# Patient Record
Sex: FEMALE | Race: WHITE | Employment: PART TIME | URBAN - METROPOLITAN AREA
[De-identification: names, ages, dates, MRNs, and addresses within clinical notes are randomized per-mention and may not be internally consistent; named-entity substitution may affect disease eponyms.]

---

## 2017-01-06 ENCOUNTER — OFFICE VISIT (OUTPATIENT)
Dept: OBGYN | Facility: CLINIC | Age: 47
End: 2017-01-06
Payer: COMMERCIAL

## 2017-01-06 VITALS
BODY MASS INDEX: 22.45 KG/M2 | WEIGHT: 131.5 LBS | HEIGHT: 64 IN | SYSTOLIC BLOOD PRESSURE: 142 MMHG | DIASTOLIC BLOOD PRESSURE: 82 MMHG

## 2017-01-06 DIAGNOSIS — Z01.419 ENCOUNTER FOR GYNECOLOGICAL EXAMINATION WITHOUT ABNORMAL FINDING: Primary | ICD-10-CM

## 2017-01-06 DIAGNOSIS — Z13.6 CARDIOVASCULAR SCREENING; LDL GOAL LESS THAN 160: ICD-10-CM

## 2017-01-06 DIAGNOSIS — G44.219 EPISODIC TENSION-TYPE HEADACHE, NOT INTRACTABLE: ICD-10-CM

## 2017-01-06 DIAGNOSIS — I10 ESSENTIAL HYPERTENSION, BENIGN: ICD-10-CM

## 2017-01-06 PROCEDURE — G0145 SCR C/V CYTO,THINLAYER,RESCR: HCPCS | Mod: 90 | Performed by: OBSTETRICS & GYNECOLOGY

## 2017-01-06 PROCEDURE — 99000 SPECIMEN HANDLING OFFICE-LAB: CPT | Performed by: OBSTETRICS & GYNECOLOGY

## 2017-01-06 PROCEDURE — 99396 PREV VISIT EST AGE 40-64: CPT | Performed by: OBSTETRICS & GYNECOLOGY

## 2017-01-06 PROCEDURE — 80061 LIPID PANEL: CPT | Performed by: OBSTETRICS & GYNECOLOGY

## 2017-01-06 PROCEDURE — 87624 HPV HI-RISK TYP POOLED RSLT: CPT | Mod: 90 | Performed by: OBSTETRICS & GYNECOLOGY

## 2017-01-06 PROCEDURE — 36415 COLL VENOUS BLD VENIPUNCTURE: CPT | Performed by: OBSTETRICS & GYNECOLOGY

## 2017-01-06 PROCEDURE — 80053 COMPREHEN METABOLIC PANEL: CPT | Performed by: OBSTETRICS & GYNECOLOGY

## 2017-01-06 RX ORDER — TOPIRAMATE 25 MG/1
25 TABLET, FILM COATED ORAL 2 TIMES DAILY
Qty: 180 TABLET | Refills: 3 | Status: SHIPPED | OUTPATIENT
Start: 2017-01-06 | End: 2018-07-13

## 2017-01-06 RX ORDER — CANDESARTAN 4 MG/1
4 TABLET ORAL DAILY
Qty: 30 TABLET | Refills: 3 | Status: SHIPPED | OUTPATIENT
Start: 2017-01-06 | End: 2017-01-26

## 2017-01-06 NOTE — PROGRESS NOTES
"  SUBJECTIVE:  Evita Bey is an 46 year old  P4 woman who presents for annual exam. Patient's last menstrual period was 2016.      -notes elevated BP; requests initiation of anti-hypertensive     -will follow up with PCP    Past Medical History   Diagnosis Date     Calculus of kidney 1996     Calcium stone - etiology after w/u was excess calcium intake     Volvulus (H) child     Resolved without surgery - hospitalized for 1 day     Fertility testing 94-01     used lifetime total of 11 months of Clomid and 2 months of gonadotropins     Family history of other cardiovascular diseases      father with MI at age 60 yo, s/p PTCA     Past Surgical History   Procedure Laterality Date     C  delivery only  94,96,99,02     4 C/S     Current Outpatient Prescriptions   Medication     candesartan (ATACAND) 4 MG TABS tablet     topiramate (TOPAMAX) 25 MG tablet     etonogestrel-ethinyl estradiol (NUVARING) 0.12-0.015 MG/24HR vaginal ring     [DISCONTINUED] topiramate (TOPAMAX) 25 MG tablet     Cholecalciferol (VITAMIN D) 2000 UNITS CAPS     OMEGA-3 CAPS   OR     MULTI-VITAMIN OR TABS     GLUCOSAMINE 500 MG OR CAPS     PROBIOTICA OR CHEW     No current facility-administered medications for this visit.     Allergies   Allergen Reactions     No Known Drug Allergies      Social History   Substance Use Topics     Smoking status: Never Smoker      Smokeless tobacco: Never Used     Alcohol Use: 0.0 oz/week     0 Standard drinks or equivalent per week      Comment: 1 wine cooler a week       Review of Systems  CONSTITUTIONAL:NEGATIVE  EYES: NEGATIVE  ENT/MOUTH: NEGATIVE  RESP: NEGATIVE  CV: see above  GI: NEGATIVE  : NEGATIVE  MUSCULOSKELATAL: NEGATIVE  INTEGUMENTARY/SKIN: NEGATIVE  BREAST: NEGATIVE  NEURO: NEGATIVE  ENDOCRINE: NEGATIVE  HEME/ALLERGY/IMMUNE: NEGATIVE  PSYCHIATRIC: NEGATIVE    OBJECTIVE:  /82 mmHg  Ht 5' 4\" (1.626 m)  Wt 131 lb 8 oz (59.648 kg)  BMI 22.56 kg/m2  LMP 2016 "   EXAM:  GENERAL APPEARANCE: healthy, alert and no distress  BREAST: normal without masses, tenderness or nipple discharge and no palpable axillary masses or adenopathy  ABDOMEN: soft, nontender, without hepatosplenomegaly or masses    Pelvic Exam:  Urethra; negative  Vulva: No external lesions, normal hair distribution, no adenopathy  Vagina: Moist, pink, no abnormal discharge, well rugated, no lesions  Cervix: Pap smear is taken, parous, smooth, pink, no visible lesions  Uterus: Normal size, anteverted, non-tender, mobile  Ovaries: No mass, non-tender, mobile      ASSESSMENT:  Satisfactory annual gyn exam    PLAN:  (I10) Essential hypertension, benign  (primary encounter diagnosis)  Plan: candesartan (ATACAND) 4 MG TABS tablet          (G44.219) Episodic tension-type headache, not intractable  Plan: topiramate (TOPAMAX) 25 MG tablet          (Z13.6) CARDIOVASCULAR SCREENING; LDL GOAL LESS THAN 160  Plan: Lipid Profile (Chol, Trig, HDL, LDL calc),         Comprehensive metabolic panel (BMP + Alb, Alk         Phos, ALT, AST, Total. Bili, TP)             PE: reviewed health maintenance including diet, regular exercise and periodic exams.  Health Maintenance   Topic Date Due     PAP Q3 YR  09/20/2016     INFLUENZA VACCINE (SYSTEM ASSIGNED)  09/01/2017     TETANUS Q10 YR  10/08/2020     LIPID SCREEN Q5 YR FEMALE (SYSTEM ASSIGNED)  10/23/2020

## 2017-01-06 NOTE — NURSING NOTE
"Chief Complaint   Patient presents with     Gyn Exam   no concerns.  Annabel Santos MA      Initial /82 mmHg  Ht 5' 4\" (1.626 m)  Wt 131 lb 8 oz (59.648 kg)  BMI 22.56 kg/m2  LMP 11/01/2016 Estimated body mass index is 22.56 kg/(m^2) as calculated from the following:    Height as of this encounter: 5' 4\" (1.626 m).    Weight as of this encounter: 131 lb 8 oz (59.648 kg).  BP completed using cuff size: regular    "

## 2017-01-06 NOTE — Clinical Note
[facility]  [address], [address]  [place] [address]  [phone]        January 10, 2017    Evita Bey  [address] [address]            Dear Ms. Evita Bey:      The results of your recent Comprehensive profile and Lipid profile were NORMAL.  Enclosed is a copy of your results.    If you have any further questions or problems, please contact our office.    Sincerely,      Job Stuart M.D.

## 2017-01-07 LAB
ALBUMIN SERPL-MCNC: 3.7 G/DL (ref 3.4–5)
ALP SERPL-CCNC: 51 U/L (ref 40–150)
ALT SERPL W P-5'-P-CCNC: 25 U/L (ref 0–50)
ANION GAP SERPL CALCULATED.3IONS-SCNC: 11 MMOL/L (ref 3–14)
AST SERPL W P-5'-P-CCNC: 21 U/L (ref 0–45)
BILIRUB SERPL-MCNC: 0.5 MG/DL (ref 0.2–1.3)
BUN SERPL-MCNC: 17 MG/DL (ref 7–30)
CALCIUM SERPL-MCNC: 9.2 MG/DL (ref 8.5–10.1)
CHLORIDE SERPL-SCNC: 108 MMOL/L (ref 94–109)
CHOLEST SERPL-MCNC: 186 MG/DL
CO2 SERPL-SCNC: 22 MMOL/L (ref 20–32)
CREAT SERPL-MCNC: 1.08 MG/DL (ref 0.52–1.04)
GFR SERPL CREATININE-BSD FRML MDRD: 54 ML/MIN/1.7M2
GLUCOSE SERPL-MCNC: 91 MG/DL (ref 70–99)
HDLC SERPL-MCNC: 67 MG/DL
LDLC SERPL CALC-MCNC: 97 MG/DL
NONHDLC SERPL-MCNC: 119 MG/DL
POTASSIUM SERPL-SCNC: 4.1 MMOL/L (ref 3.4–5.3)
PROT SERPL-MCNC: 7.2 G/DL (ref 6.8–8.8)
SODIUM SERPL-SCNC: 141 MMOL/L (ref 133–144)
TRIGL SERPL-MCNC: 112 MG/DL

## 2017-01-11 LAB
COPATH REPORT: NORMAL
PAP: NORMAL

## 2017-01-13 LAB
FINAL DIAGNOSIS: NORMAL
HPV HR 12 DNA CVX QL NAA+PROBE: NEGATIVE
HPV16 DNA SPEC QL NAA+PROBE: NEGATIVE
HPV18 DNA SPEC QL NAA+PROBE: NEGATIVE
SPECIMEN DESCRIPTION: NORMAL

## 2017-01-26 ENCOUNTER — OFFICE VISIT (OUTPATIENT)
Dept: FAMILY MEDICINE | Facility: CLINIC | Age: 47
End: 2017-01-26
Payer: COMMERCIAL

## 2017-01-26 ENCOUNTER — RADIANT APPOINTMENT (OUTPATIENT)
Dept: GENERAL RADIOLOGY | Facility: CLINIC | Age: 47
End: 2017-01-26
Attending: FAMILY MEDICINE
Payer: COMMERCIAL

## 2017-01-26 VITALS
HEIGHT: 64 IN | DIASTOLIC BLOOD PRESSURE: 78 MMHG | RESPIRATION RATE: 12 BRPM | BODY MASS INDEX: 22.02 KG/M2 | OXYGEN SATURATION: 100 % | SYSTOLIC BLOOD PRESSURE: 118 MMHG | TEMPERATURE: 98.1 F | HEART RATE: 70 BPM | WEIGHT: 129 LBS

## 2017-01-26 DIAGNOSIS — Z82.49 FAMILY HISTORY OF ISCHEMIC HEART DISEASE: ICD-10-CM

## 2017-01-26 DIAGNOSIS — R07.89 ATYPICAL CHEST PAIN: Primary | ICD-10-CM

## 2017-01-26 DIAGNOSIS — I10 ESSENTIAL HYPERTENSION WITH GOAL BLOOD PRESSURE LESS THAN 140/90: ICD-10-CM

## 2017-01-26 DIAGNOSIS — R07.89 ATYPICAL CHEST PAIN: ICD-10-CM

## 2017-01-26 DIAGNOSIS — Z80.41 FAMILY HISTORY OF OVARIAN CANCER: ICD-10-CM

## 2017-01-26 LAB
ALBUMIN SERPL-MCNC: 3.9 G/DL (ref 3.4–5)
ALP SERPL-CCNC: 48 U/L (ref 40–150)
ALT SERPL W P-5'-P-CCNC: 32 U/L (ref 0–50)
ANION GAP SERPL CALCULATED.3IONS-SCNC: 12 MMOL/L (ref 3–14)
AST SERPL W P-5'-P-CCNC: 26 U/L (ref 0–45)
BASOPHILS # BLD AUTO: 0 10E9/L (ref 0–0.2)
BASOPHILS NFR BLD AUTO: 0.1 %
BILIRUB SERPL-MCNC: 0.8 MG/DL (ref 0.2–1.3)
BUN SERPL-MCNC: 16 MG/DL (ref 7–30)
CALCIUM SERPL-MCNC: 9.1 MG/DL (ref 8.5–10.1)
CHLORIDE SERPL-SCNC: 106 MMOL/L (ref 94–109)
CK SERPL-CCNC: 51 U/L (ref 30–225)
CO2 SERPL-SCNC: 21 MMOL/L (ref 20–32)
CREAT SERPL-MCNC: 0.97 MG/DL (ref 0.52–1.04)
DIFFERENTIAL METHOD BLD: NORMAL
EOSINOPHIL # BLD AUTO: 0 10E9/L (ref 0–0.7)
EOSINOPHIL NFR BLD AUTO: 0.3 %
ERYTHROCYTE [DISTWIDTH] IN BLOOD BY AUTOMATED COUNT: 11.9 % (ref 10–15)
GFR SERPL CREATININE-BSD FRML MDRD: 61 ML/MIN/1.7M2
GLUCOSE SERPL-MCNC: 86 MG/DL (ref 70–99)
HCT VFR BLD AUTO: 41 % (ref 35–47)
HGB BLD-MCNC: 14.3 G/DL (ref 11.7–15.7)
LYMPHOCYTES # BLD AUTO: 1.6 10E9/L (ref 0.8–5.3)
LYMPHOCYTES NFR BLD AUTO: 20.5 %
MCH RBC QN AUTO: 31.6 PG (ref 26.5–33)
MCHC RBC AUTO-ENTMCNC: 34.9 G/DL (ref 31.5–36.5)
MCV RBC AUTO: 91 FL (ref 78–100)
MONOCYTES # BLD AUTO: 0.4 10E9/L (ref 0–1.3)
MONOCYTES NFR BLD AUTO: 5.5 %
NEUTROPHILS # BLD AUTO: 5.8 10E9/L (ref 1.6–8.3)
NEUTROPHILS NFR BLD AUTO: 73.6 %
PLATELET # BLD AUTO: 230 10E9/L (ref 150–450)
POTASSIUM SERPL-SCNC: 3.7 MMOL/L (ref 3.4–5.3)
PROT SERPL-MCNC: 7.6 G/DL (ref 6.8–8.8)
RBC # BLD AUTO: 4.53 10E12/L (ref 3.8–5.2)
SODIUM SERPL-SCNC: 139 MMOL/L (ref 133–144)
TSH SERPL DL<=0.005 MIU/L-ACNC: 2.15 MU/L (ref 0.4–4)
WBC # BLD AUTO: 7.9 10E9/L (ref 4–11)

## 2017-01-26 PROCEDURE — 71020 XR CHEST 2 VW: CPT

## 2017-01-26 PROCEDURE — 82172 ASSAY OF APOLIPOPROTEIN: CPT | Mod: 90 | Performed by: FAMILY MEDICINE

## 2017-01-26 PROCEDURE — 99000 SPECIMEN HANDLING OFFICE-LAB: CPT | Performed by: FAMILY MEDICINE

## 2017-01-26 PROCEDURE — 93000 ELECTROCARDIOGRAM COMPLETE: CPT | Performed by: FAMILY MEDICINE

## 2017-01-26 PROCEDURE — 80050 GENERAL HEALTH PANEL: CPT | Performed by: FAMILY MEDICINE

## 2017-01-26 PROCEDURE — 99214 OFFICE O/P EST MOD 30 MIN: CPT | Performed by: FAMILY MEDICINE

## 2017-01-26 PROCEDURE — 82043 UR ALBUMIN QUANTITATIVE: CPT | Performed by: FAMILY MEDICINE

## 2017-01-26 PROCEDURE — 82550 ASSAY OF CK (CPK): CPT | Performed by: FAMILY MEDICINE

## 2017-01-26 PROCEDURE — 36415 COLL VENOUS BLD VENIPUNCTURE: CPT | Performed by: FAMILY MEDICINE

## 2017-01-26 RX ORDER — CANDESARTAN 4 MG/1
8 TABLET ORAL DAILY
Qty: 30 TABLET | Refills: 3 | COMMUNITY
Start: 2017-01-26 | End: 2017-02-24

## 2017-01-26 RX ORDER — CANDESARTAN 8 MG/1
8 TABLET ORAL DAILY
Qty: 30 TABLET | Refills: 5 | Status: SHIPPED | OUTPATIENT
Start: 2017-01-26 | End: 2017-01-26

## 2017-01-26 RX ORDER — CANDESARTAN 8 MG/1
8 TABLET ORAL DAILY
Qty: 90 TABLET | Refills: 1 | Status: SHIPPED | OUTPATIENT
Start: 2017-01-26 | End: 2017-12-26

## 2017-01-26 ASSESSMENT — ANXIETY QUESTIONNAIRES
3. WORRYING TOO MUCH ABOUT DIFFERENT THINGS: NOT AT ALL
5. BEING SO RESTLESS THAT IT IS HARD TO SIT STILL: NOT AT ALL
GAD7 TOTAL SCORE: 0
1. FEELING NERVOUS, ANXIOUS, OR ON EDGE: NOT AT ALL
6. BECOMING EASILY ANNOYED OR IRRITABLE: NOT AT ALL
7. FEELING AFRAID AS IF SOMETHING AWFUL MIGHT HAPPEN: NOT AT ALL
2. NOT BEING ABLE TO STOP OR CONTROL WORRYING: NOT AT ALL

## 2017-01-26 ASSESSMENT — PATIENT HEALTH QUESTIONNAIRE - PHQ9: 5. POOR APPETITE OR OVEREATING: NOT AT ALL

## 2017-01-26 NOTE — MR AVS SNAPSHOT
After Visit Summary   1/26/2017    DR Evita Bey    MRN: [id number]           Patient Information     Date Of Birth          1970        Visit Information        Provider Department      1/26/2017 10:00 AM Patti Purcell MD [facility]        Today's Diagnoses     Atypical chest pain    -  1     Family history of ischemic heart disease - father's side of family - with normal lipids         Essential hypertension with goal blood pressure less than 140/90         Family history of ovarian cancer           Care Instructions    Please, call or return to clinic or go to the ER immediately if signs or symptoms worsen or fail to improve as anticipated.                      Chest Pain, Noncardiac  What is noncardiac chest pain?   Chest pain is discomfort that is felt anywhere between your neck and belly button. Noncardiac chest pain is pain that is not caused by a heart problem. Because it is very important to determine the cause, always see your healthcare provider if you have chest pain.   How does it occur?   The most worrisome causes of chest pain are related to your heart. However, many causes of chest pain are not related to a heart problem. These include:   swallowing disorders such as esophageal spasm, caused by the muscles of the lower esophagus squeezing painfully due to acid reflux or stress   gastrointestinal disorders such as heartburn, which is stomach acid backing up into the esophagus   lung disease such as bronchitis or pneumonia   problems affecting the ribs and chest muscles such as muscle strain or inflammation of the rib joints or muscles   anxiety or panic attacks   inflammation of the sack around the heart (pericarditis) or of the lining of the lungs (pleuritis/pleurisy).   How is it diagnosed?   Keeping notes about your chest pain will help your healthcare provider make the diagnosis. Write down:   what the pain feels like, such as stabbing, dull, or  burning   when it happens and how long it lasts   where it hurts   what makes it better or worse   any other symptoms, such as nausea, vomiting, sweating, or trouble breathing.   Your provider will ask about your symptoms and medical history and examine you. You may have the following tests:   electrocardiogram (ECG)   exercise stress test   echocardiogram (ultrasound scan of the heart)   cardiac angiogram   blood tests   X-rays, such as an upper GI exam   tests of your esophagus.   How is it treated?   After your provider has confirmed that the chest pain is not caused by a heart problem, he or she will recommend treatment for the problem that is causing the pain.   When should I call my healthcare provider?   Tell your provider if your noncardiac chest pain is getting worse while you are using the treatment your provider recommends. You may need a different medicine or change in dosage, a different treatment, or more tests.   If you have new or different chest pain, call your healthcare provider or 911, or go to a hospital emergency room right away if:   You have chest discomfort (pressure, fullness, squeezing, or pain) in the center of your chest that lasts more than 5 minutes or goes away and comes back.   You also have pain or discomfort in one or both arms, the back, neck, jaw, or stomach.   You have chest pain with lightheadedness, nausea, or a cold sweat.   You have trouble breathing during the chest pain.   If you live in an area where there is no 911 or ambulance service, have someone drive you to the closest emergency room right away. You can also call the closest law enforcement agency (police, , or highway patrol) to help drive you to the emergency room.     Published by [employer].  This content is reviewed periodically and is subject to change as new health information becomes available. The information is intended to inform and educate and is not a replacement for medical evaluation, advice,  diagnosis or treatment by a healthcare professional.   Developed by [employer].   ? 2010 [facility] and/or its affiliates. All Rights Reserved.   Copyright   Clinical Reference Systems 2011               Thank you for choosing [facility]  for your Health Care. It was a pleasure seeing you at your visit today. Please contact us with any questions or concerns you may have.                   Patti Purcell MD                                  To reach your [facility] care team after hours call:   [phone]    Our clinic hours are:     Monday- 7:30 am - 7:00 pm                             Tuesday through Friday- 7:30 am - 5:00 pm                                        Saturday- 8:00 am - 12:00 pm                  Phone:  [phone]    Our pharmacy hours are:     Monday  8:00 am to 7:00 pm      Tuesday through Friday 8:00am to 6:00pm                        Saturday - 9:00 am to 1:00 pm      Sunday : Closed.              Phone:  [phone]      There is also information available at our web site:  [email or web address]    If your provider ordered any lab tests and you do not receive the results within 10 business days, please call the clinic.    If you need a medication refill please contact your pharmacy.  Please allow 2 business days for your refill to be completed.    Our clinic offers telephone visits and e visits.  Please ask one of your team members to explain more.      Use Linkpas[employer] (secure email communication and access to your chart) to send your primary care provider a message or make an appointment. Ask someone on your Team how to sign up for Linkpasst.                               Follow-ups after your visit        Additional Services     CARDIOLOGY EVAL ADULT REFERRAL       Your provider has referred you to:  [facility] [phone]   [email or web address]  [facility] [phone]   [email or web address]    Please be aware that coverage of these services is subject to the terms and limitations of your health insurance plan.  Call member services at your health plan with any benefit or coverage questions.      Type of Referral:  New Cardiology Consult - Dr. VAUGHN Garcia, please.     Timeframe requested:  Within 1 month    Please bring the following to your appointment:  >>   Any x-rays, CTs or MRIs which have been performed.  Contact the facility where they were done to arrange for  prior to your scheduled appointment.  Any new CT, MRI or other procedures ordered by your specialist must be performed at a [facility] facility or coordinated by your clinic's referral office.   >>   List of current medications  >>   This referral request   >>   Any documents/labs given to you for this referral                  Who to contact     If you have questions or need follow up information about today's clinic visit or your schedule please contact [facility] directly at [phone].  Normal or non-critical lab and imaging results will be communicated to you by MyChart, letter or phone within 4 business days after the clinic has received the results. If you do not hear from us within 7 days, please contact the clinic through [employer] or phone. If you have a critical or abnormal lab result, we will notify you by phone as soon as possible.  Submit refill requests through [employer] or call your pharmacy and they will forward the refill request to us. Please allow 3 business days for your refill to be completed.          Additional Information About Your Visit        Super[employer] Information     [employer] gives you secure access to your electronic health record. If you see a primary care provider, you can also send messages to your care team and make appointments. If you have questions, please call your  "primary care clinic.  If you do not have a primary care provider, please call [phone] and they will assist you.        Care EveryWhere ID     This is your Care EveryWhere ID. This could be used by other organizations to access your [place] medical records  [id number]        Your Vitals Were     Pulse Temperature Respirations    108 98.1  F (36.7  C) (Tympanic) 12    Height BMI (Body Mass Index) Pulse Oximetry    5' 4\" (1.626 m) 22.13 kg/m2 100%    Last Period Breastfeeding?       11/01/2016 No        Blood Pressure from Last 3 Encounters:   01/26/17 118/78   01/06/17 142/82   04/26/16 126/88    Weight from Last 3 Encounters:   01/26/17 129 lb (58.514 kg)   01/06/17 131 lb 8 oz (59.648 kg)   04/26/16 126 lb 6.4 oz (57.335 kg)              We Performed the Following     Albumin Random Urine Quantitative     Apolipoprotein A1     Apolipoprotein B     CARDIOLOGY EVAL ADULT REFERRAL     CBC with platelets differential     CK total     Comprehensive metabolic panel     EKG 12-lead complete w/read - Clinics     TSH with free T4 reflex          Today's Medication Changes          These changes are accurate as of: 1/26/17 11:31 AM.  If you have any questions, ask your nurse or doctor.               These medicines have changed or have updated prescriptions.        Dose/Directions    * candesartan 4 MG Tabs tablet   Commonly known as:  ATACAND   This may have changed:  how much to take   Changed by:  Patti Purcell MD        Dose:  8 mg   Take 2 tablets (8 mg) by mouth daily   Quantity:  30 tablet   Refills:  3       * candesartan 8 MG Tabs   Commonly known as:  ATACAND   This may have changed:  You were already taking a medication with the same name, and this prescription was added. Make sure you understand how and when to take each.   Used for:  Essential hypertension with goal blood pressure less than 140/90   Changed by:  Patti Purcell MD        Dose:  8 mg   Take 1 tablet (8 mg) by mouth daily "   Quantity:  30 tablet   Refills:  5       * Notice:  This list has 2 medication(s) that are the same as other medications prescribed for you. Read the directions carefully, and ask your doctor or other care provider to review them with you.         Where to get your medicines      These medications were sent to [facility] [place], [place] - [address]  [address] [facility] [address]     Phone:  [phone]    - candesartan 8 MG Tabs             Primary Care Provider Office Phone # Fax #    Job Stuart [phone]       [facility] [address] [facility]  [address]  [facility] [address]        Thank you!     Thank you for choosing [facility]  for your care. Our goal is always to provide you with excellent care. Hearing back from our patients is one way we can continue to improve our services. Please take a few minutes to complete the written survey that you may receive in the mail after your visit with us. Thank you!             Your Updated Medication List - Protect others around you: Learn how to safely use, store and throw away your medicines at www.disposemymeds.org.          This list is accurate as of: 1/26/17 11:31 AM.  Always use your most recent med list.                   Brand Name Dispense Instructions for use    * candesartan 4 MG Tabs tablet    ATACAND    30 tablet    Take 2 tablets (8 mg) by mouth daily       * candesartan 8 MG Tabs    ATACAND    30 tablet    Take 1 tablet (8 mg) by mouth daily       etonogestrel-ethinyl estradiol 0.12-0.015 MG/24HR vaginal ring    NUVARING    3 each    Place 1 each vaginally every 21 days AS DIRECTED, REMOVE FOR 1 WEEK       glucosamine 500 MG Caps      1 tab BID       Multi-vitamin Tabs tablet   Generic drug:  multivitamin, therapeutic with minerals     30    1 TABLET DAILY       OMEGA-3 CAPS   OR      2 caps Twice daily       PROBIOTICA Chew          topiramate 25 MG tablet    TOPAMAX     180 tablet    Take 1 tablet (25 mg) by mouth 2 times daily       vitamin D 2000 UNITS Caps      Take  by mouth.       * Notice:  This list has 2 medication(s) that are the same as other medications prescribed for you. Read the directions carefully, and ask your doctor or other care provider to review them with you.

## 2017-01-26 NOTE — PATIENT INSTRUCTIONS
Please, call or return to clinic or go to the ER immediately if signs or symptoms worsen or fail to improve as anticipated.                      Chest Pain, Noncardiac  What is noncardiac chest pain?   Chest pain is discomfort that is felt anywhere between your neck and belly button. Noncardiac chest pain is pain that is not caused by a heart problem. Because it is very important to determine the cause, always see your healthcare provider if you have chest pain.   How does it occur?   The most worrisome causes of chest pain are related to your heart. However, many causes of chest pain are not related to a heart problem. These include:   swallowing disorders such as esophageal spasm, caused by the muscles of the lower esophagus squeezing painfully due to acid reflux or stress   gastrointestinal disorders such as heartburn, which is stomach acid backing up into the esophagus   lung disease such as bronchitis or pneumonia   problems affecting the ribs and chest muscles such as muscle strain or inflammation of the rib joints or muscles   anxiety or panic attacks   inflammation of the sack around the heart (pericarditis) or of the lining of the lungs (pleuritis/pleurisy).   How is it diagnosed?   Keeping notes about your chest pain will help your healthcare provider make the diagnosis. Write down:   what the pain feels like, such as stabbing, dull, or burning   when it happens and how long it lasts   where it hurts   what makes it better or worse   any other symptoms, such as nausea, vomiting, sweating, or trouble breathing.   Your provider will ask about your symptoms and medical history and examine you. You may have the following tests:   electrocardiogram (ECG)   exercise stress test   echocardiogram (ultrasound scan of the heart)   cardiac angiogram   blood tests   X-rays, such as an upper GI exam   tests of your esophagus.   How is it treated?   After your provider has confirmed that the chest pain is not caused by  a heart problem, he or she will recommend treatment for the problem that is causing the pain.   When should I call my healthcare provider?   Tell your provider if your noncardiac chest pain is getting worse while you are using the treatment your provider recommends. You may need a different medicine or change in dosage, a different treatment, or more tests.   If you have new or different chest pain, call your healthcare provider or 911, or go to a hospital emergency room right away if:   You have chest discomfort (pressure, fullness, squeezing, or pain) in the center of your chest that lasts more than 5 minutes or goes away and comes back.   You also have pain or discomfort in one or both arms, the back, neck, jaw, or stomach.   You have chest pain with lightheadedness, nausea, or a cold sweat.   You have trouble breathing during the chest pain.   If you live in an area where there is no 911 or ambulance service, have someone drive you to the closest emergency room right away. You can also call the closest law enforcement agency (police, , or highway patrol) to help drive you to the emergency room.     Published by "[employer].  This content is reviewed periodically and is subject to change as new health information becomes available. The information is intended to inform and educate and is not a replacement for medical evaluation, advice, diagnosis or treatment by a healthcare professional.   Developed by "[employer].   ? 2010 "[employer] and/or its affiliates. All Rights Reserved.   Copyright   Clinical Reference Systems 2011               Thank you for choosing [facility]  for your Health Care. It was a pleasure seeing you at your visit today. Please contact us with any questions or concerns you may have.                   Patti Purcell MD                                  To reach your [facility] care team after hours call:   [phone]    Our clinic hours  are:     Monday- 7:30 am - 7:00 pm                             Tuesday through Friday- 7:30 am - 5:00 pm                                        Saturday- 8:00 am - 12:00 pm                  Phone:  [phone]    Our pharmacy hours are:     Monday  8:00 am to 7:00 pm      Tuesday through Friday 8:00am to 6:00pm                        Saturday - 9:00 am to 1:00 pm      Sunday : Closed.              Phone:  [phone]      There is also information available at our web site:  [email or web address]    If your provider ordered any lab tests and you do not receive the results within 10 business days, please call the clinic.    If you need a medication refill please contact your pharmacy.  Please allow 2 business days for your refill to be completed.    Our clinic offers telephone visits and e visits.  Please ask one of your team members to explain more.      Use [employer] (secure email communication and access to your chart) to send your primary care provider a message or make an appointment. Ask someone on your Team how to sign up for [employer].

## 2017-01-26 NOTE — PROGRESS NOTES
"  SUBJECTIVE:                                                    Evita Bey is a 46 year old female who presents to clinic today for the following health issues:      Concern - Hypertension     Onset: ***    Description:   ***    Intensity: {.:057265}    Progression of Symptoms:  {.:115168}    Accompanying Signs & Symptoms:  ***       Previous history of similar problem:   ***    Precipitating factors:   Worsened by: ***    Alleviating factors:  Improved by: ***       Therapies Tried and outcome: ***      {additional problems for provider to add:161135}    Problem list and histories reviewed & adjusted, as indicated.  Additional history: {NONE - AS DOCUMENTED:477264::\"as documented\"}    {HIST REVIEW/ LINKS 2:368937}    {PROVIDER CHARTING PREFERENCE:334194}  "

## 2017-01-26 NOTE — PROGRESS NOTES
SUBJECTIVE:                                                    Evita Bey is a 46 year old female who presents to clinic today for the following health issues:      Hypertension Follow-up - hx of prehypertension.  fam hx of hypertension - both mom and dad with mild forms of hypertension .       Outpatient blood pressures are not being checked.    Low Salt Diet: no added salt    Saw Dr. Stuart for her annual exam. Was found to be hypertensive. See below. Started on candesartan 4mg per recent studies from [facility] and [facility] that showed approx. 50% decreased risk of alzheimer's dementia with candesartan ( probably a class effect, but not shown to be so as yet).  .   CP seemed to go away 3 days after starting on  8 mg of candesartan up from 4mg of candesartan.   No cough, No fevers, chills or sweats. No gerd. No tachycardia.   Does 1 hr of cardio - 35 min on elliptical, then 25 on treadmill = 3-4x/week and strength training 2-3x/week.     BP Readings from Last 6 Encounters:   01/26/17 118/78   01/06/17 142/82   04/26/16 126/88   10/23/15 120/80   09/16/14 118/72   09/20/13 124/64         Positive fam hx of CAD.  Pt getting chest pain at rest ( like a pressure behind her heart that is pressing out towards the chest wall)  and with exertion at times - no rhyme or reason to it.   No anxiety feelings.   CLAIR-7 SCORE 1/26/2017   Total Score 0                 Amount of exercise or physical activity: 4-5 days/week for an average of 15-30 minutes    Problems taking medications regularly: No    Medication side effects: none    Diet: low salt    Last Basic Metabolic Panel:  NA      141   1/6/2017   POTASSIUM      4.1   1/6/2017  CHLORIDE      108   1/6/2017  SEKOU      9.2   1/6/2017  CO2       22   1/6/2017  BUN       17   1/6/2017  CR     1.08   1/6/2017  GLC       91   1/6/2017    TSH   Date Value Ref Range Status   08/21/2013 1.83 0.4 - 5.0 mU/L Final         GFR ESTIMATE   Date Value Ref  Range Status   01/06/2017 54* >60 mL/min/1.7m2 Final     Comment:     Non  GFR Calc   12/12/2011 73 >60 mL/min/1.7m2 Final   11/16/2011 57* >60 mL/min/1.7m2 Final     GFR ESTIMATE IF BLACK   Date Value Ref Range Status   01/06/2017 65 >60 mL/min/1.7m2 Final     Comment:      GFR Calc   12/12/2011 88 >60 mL/min/1.7m2 Final   11/16/2011 68 >60 mL/min/1.7m2 Final      No hx of gestational diabetes. Lost 20 pounds after lipid panel in 10/2015 and amped up her exercise.     Recent Labs   Lab Test  01/06/17   1028  10/23/15   1041   11/21/08   1048   CHOL  186  212*   < >  153   HDL  67  54   < >  40*   LDL  97  137*   --   94   TRIG  112  105   --   91   CHOLHDLRATIO   --   3.9   --   3.8    < > = values in this interval not displayed.      Patient Active Problem List   Diagnosis     Family history of ischemic heart disease - father's side of family - with normal lipids     Family history of ovarian cancer - following with Dr. Stuart - ? BSO in late 40's      CARDIOVASCULAR SCREENING; LDL GOAL LESS THAN 160     Essential hypertension with goal blood pressure less than 140/90       Current Outpatient Prescriptions   Medication Sig Dispense Refill     candesartan (ATACAND) 4 MG TABS tablet Take 2 tablets (8 mg) by mouth daily 30 tablet 3     etonogestrel-ethinyl estradiol (NUVARING) 0.12-0.015 MG/24HR vaginal ring Place 1 each vaginally every 21 days AS DIRECTED, REMOVE FOR 1 WEEK 3 each 4     Cholecalciferol (VITAMIN D) 2000 UNITS CAPS Take  by mouth.       OMEGA-3 CAPS   OR 2 caps Twice daily  0     MULTI-VITAMIN OR TABS 1 TABLET DAILY 30 0     GLUCOSAMINE 500 MG OR CAPS 1 tab BID  0     topiramate (TOPAMAX) 25 MG tablet Take 1 tablet (25 mg) by mouth 2 times daily 180 tablet 3     [DISCONTINUED] candesartan (ATACAND) 4 MG TABS tablet Take 1 tablet (4 mg) by mouth daily 30 tablet 3     PROBIOTICA OR CHEW   0          Allergies   Allergen Reactions     No Known Drug Allergies   "        Problem list and histories reviewed & adjusted, as indicated.  Additional history: as documented    Labs reviewed in EPIC  Problem list, Medication list, Allergies, and Medical/Social/Surgical histories reviewed in [facility] and updated as appropriate.    ROS:  C: NEGATIVE for fever, chills, change in weight  E/M: NEGATIVE for ear, mouth and throat problems  R: NEGATIVE for significant cough or SOB  CV: NEGATIVE for chest pain, palpitations or peripheral edema    OBJECTIVE:                                                    /78 mmHg  Pulse 70  Temp(Src) 98.1  F (36.7  C) (Tympanic)  Resp 12  Ht 5' 4\" (1.626 m)  Wt 129 lb (58.514 kg)  BMI 22.13 kg/m2  SpO2 100%  LMP 11/01/2016  Breastfeeding? No  Body mass index is 22.13 kg/(m^2).   GENERAL: healthy, alert, well nourished, well hydrated, no distress  HENT: ear canals- normal; TMs- normal; Nose- normal; Mouth- no ulcers, no lesions  NECK: no tenderness, no adenopathy, no asymmetry, no masses, no stiffness; thyroid- normal to palpation  RESP: lungs clear to auscultation - no rales, no rhonchi, no wheezes  CV: regular rates and rhythm, normal S1 S2, no S3 or S4 and no murmur, no click or rub -  ABDOMEN: soft, no tenderness, no  hepatosplenomegaly, no masses, normal bowel sounds  MS: extremities- no gross deformities noted, no edema    Diagnostic test results:  See [facility] orders.      ASSESSMENT/PLAN:                                                        ICD-10-CM    1. Atypical chest pain R07.89 CARDIOLOGY EVAL ADULT REFERRAL     TSH with free T4 reflex     Apolipoprotein A1     Apolipoprotein B     Comprehensive metabolic panel     CK total     XR Chest 2 Views   2. Family history of ischemic heart disease - father's side of family - with normal lipids Z82.49 Albumin Random Urine Quantitative     Apolipoprotein A1     Apolipoprotein B     Comprehensive metabolic panel     CK total   3. Essential hypertension with goal blood pressure less than " 140/90 I10 CARDIOLOGY EVAL ADULT REFERRAL     Comprehensive metabolic panel     candesartan (ATACAND) 8 MG TABS     CBC with platelets differential     candesartan (ATACAND) 8 MG TABS   4. Family history of ovarian cancer Z80.41      Please, call or return to clinic or go to the ER immediately if signs or symptoms worsen or fail to improve as anticipated.   See Patient Instructions. Recheck with me in 6 months or sooner prn.  Continue excellent diet and exercise program. Maintain BMI between 20-25 as above.     Patti Purcell MD  [facility]

## 2017-01-27 LAB
CREAT UR-MCNC: <3 MG/DL
MICROALBUMIN UR-MCNC: <5 MG/L
MICROALBUMIN/CREAT UR: NORMAL MG/G CR (ref 0–25)

## 2017-01-27 ASSESSMENT — PATIENT HEALTH QUESTIONNAIRE - PHQ9: SUM OF ALL RESPONSES TO PHQ QUESTIONS 1-9: 0

## 2017-01-27 ASSESSMENT — ANXIETY QUESTIONNAIRES: GAD7 TOTAL SCORE: 0

## 2017-01-28 LAB
APO A-I SERPL-MCNC: 183
APO B100 SERPL-MCNC: 108 MG/DL

## 2017-02-24 ENCOUNTER — OFFICE VISIT (OUTPATIENT)
Dept: CARDIOLOGY | Facility: CLINIC | Age: 47
End: 2017-02-24
Attending: FAMILY MEDICINE
Payer: COMMERCIAL

## 2017-02-24 VITALS
HEIGHT: 64 IN | DIASTOLIC BLOOD PRESSURE: 76 MMHG | BODY MASS INDEX: 21.97 KG/M2 | SYSTOLIC BLOOD PRESSURE: 104 MMHG | HEART RATE: 80 BPM | WEIGHT: 128.7 LBS

## 2017-02-24 DIAGNOSIS — R07.9 CHEST PAIN, UNSPECIFIED: ICD-10-CM

## 2017-02-24 DIAGNOSIS — Z13.6 CARDIOVASCULAR SCREENING; LDL GOAL LESS THAN 160: ICD-10-CM

## 2017-02-24 DIAGNOSIS — I10 ESSENTIAL HYPERTENSION WITH GOAL BLOOD PRESSURE LESS THAN 140/90: Primary | ICD-10-CM

## 2017-02-24 DIAGNOSIS — Z82.49 FAMILY HISTORY OF ISCHEMIC HEART DISEASE: ICD-10-CM

## 2017-02-24 PROCEDURE — 99203 OFFICE O/P NEW LOW 30 MIN: CPT | Performed by: INTERNAL MEDICINE

## 2017-02-24 RX ORDER — UBIDECARENONE 50 MG
70 CAPSULE ORAL DAILY
COMMUNITY

## 2017-02-24 NOTE — MR AVS SNAPSHOT
After Visit Summary   2/24/2017    DR Evita Bey    MRN: [id number]           Patient Information     Date Of Birth          1970        Visit Information        Provider Department      2/24/2017 1:30 PM Sharee Garcia DO [facility] [place]        Today's Diagnoses     Essential hypertension with goal blood pressure less than 140/90    -  1    CARDIOVASCULAR SCREENING; LDL GOAL LESS THAN 160        Chest pain, unspecified        Family history of ischemic heart disease           Follow-ups after your visit        Additional Services     Follow-Up with Cardiologist                 Your next 10 appointments already scheduled     Feb 28, 2017  8:30 AM CST   CT CALCIUM SCREENING with SCICT1   [facility] (Cardiovascular Imaging at [facility])    [address] [address] [phone]           It is best to avoid caffeine on the day of your test.  Be sure to tell your doctor:   If there s any chance you are pregnant.   If you have any special needs.  Please wear loose clothing, such as a sweat suit or jogging clothes. Avoid snaps, zippers and other metal. We may ask you to undress and put on a hospital gown.  If you have any questions, please call the Imaging Department where you will have your exam.            Feb 28, 2017  9:30 AM CST   Ech Stress Test with [id number]   [facility] (Cardiovascular Imaging at [facility])    [address] [place] [id number] [phone]           1.  Please bring or wear a comfortable two-piece outfit and walking shoes. 2.  Stop eating 3 hours before the test. You may drink water or juice. 3.  Stop all caffeine 12 hours before the test. This includes coffee, tea, soda pop, chocolate and certain medicines (such as Anacin and Excederin). Also avoid decaf coffee and tea, as these contain small  amounts of caffeine. 4.  No alcohol, smoking or use of other tobacco products for 12 hours before the test. 5.  Refer to your provider instructions to see if you need to stop any medications (such as beta-blockers or nitrates) for this test. 6.  For patients with diabetes: -   If you take insulin, call your diabetes care team. Ask if you should take a   dose the morning of your test. -   If you take diabetes medicine by mouth, don't take it on the morning of your test. Bring it with you to take after the test.  (If you have questions, call your diabetes care team) 7.  When you arrive, please tell us if: -   You have diabetes. -   You have taken Viagra, Cialis or Levitra in the past 48 hours. 8.  For any questions that cannot be answered, please contact the ordering physician            Mar 09, 2017 12:45 PM CST   Return Visit with Sharee Garcia DO   [facility] [place] ([facility])    [address] [facility] [address] [place] [address] [phone]              Future tests that were ordered for you today     Open Future Orders        Priority Expected Expires Ordered    Follow-Up with Cardiologist Routine 3/3/2017 2/24/2018 2/24/2017    Exercise Stress Echocardiogram Routine 3/3/2017 2/24/2018 2/24/2017    CT Coronary Calcium Scan Routine 2/25/2017 2/24/2018 2/24/2017            Who to contact     If you have questions or need follow up information about today's clinic visit or your schedule please contact [facility] [place] directly at [phone].  Normal or non-critical lab and imaging results will be communicated to you by MyChart, letter or phone within 4 business days after the clinic has received the results. If you do not hear from us within 7 days, please contact the clinic through MyChart or phone. If you have a critical or abnormal lab result, we will notify you by phone as soon as possible.  Submit refill  "requests through [employer] or call your pharmacy and they will forward the refill request to us. Please allow 3 business days for your refill to be completed.          Additional Information About Your Visit        [employer] Information     [employer] gives you secure access to your electronic health record. If you see a primary care provider, you can also send messages to your care team and make appointments. If you have questions, please call your primary care clinic.  If you do not have a primary care provider, please call [phone] and they will assist you.        Care EveryWhere ID     This is your Care EveryWhere ID. This could be used by other organizations to access your [facility] medical records  [id number]        Your Vitals Were     Pulse Height BMI (Body Mass Index)             80 1.626 m (5' 4\") 22.09 kg/m2          Blood Pressure from Last 3 Encounters:   02/24/17 104/76   01/26/17 118/78   01/06/17 142/82    Weight from Last 3 Encounters:   02/24/17 58.4 kg (128 lb 11.2 oz)   01/26/17 58.5 kg (129 lb)   01/06/17 59.6 kg (131 lb 8 oz)                 Today's Medication Changes          These changes are accurate as of: 2/24/17  2:23 PM.  If you have any questions, ask your nurse or doctor.               These medicines have changed or have updated prescriptions.        Dose/Directions    topiramate 25 MG tablet   Commonly known as:  TOPAMAX   This may have changed:    - how much to take  - when to take this   Used for:  Episodic tension-type headache, not intractable        Dose:  25 mg   Take 1 tablet (25 mg) by mouth 2 times daily   Quantity:  180 tablet   Refills:  3                Primary Care Provider Office Phone # Fax #    Patti Purcell [phone]       [address] [address]        Thank you!     Thank you for choosing [facility] [place]  for your care. Our goal is always to provide you with " excellent care. Hearing back from our patients is one way we can continue to improve our services. Please take a few minutes to complete the written survey that you may receive in the mail after your visit with us. Thank you!             Your Updated Medication List - Protect others around you: Learn how to safely use, store and throw away your medicines at www.disposemymeds.org.          This list is accurate as of: 2/24/17  2:23 PM.  Always use your most recent med list.                   Brand Name Dispense Instructions for use    candesartan 8 MG Tabs    ATACAND    90 tablet    Take 1 tablet (8 mg) by mouth daily       CoQ10 50 MG Caps      Take 70 mg by mouth daily       etonogestrel-ethinyl estradiol 0.12-0.015 MG/24HR vaginal ring    NUVARING    3 each    Place 1 each vaginally every 21 days AS DIRECTED, REMOVE FOR 1 WEEK       glucosamine 500 MG Caps      1 tab BID       GRAPESEED EXTRACT PO      Take by mouth daily       Multi-vitamin Tabs tablet   Generic drug:  multivitamin, therapeutic with minerals     30    1 TABLET DAILY       OMEGA-3 CAPS   OR      2 caps Twice daily,       PROBIOTICA Chew      take one tablet daily       SEPTRA DS PO      Take by mouth 2 times daily       topiramate 25 MG tablet    TOPAMAX    180 tablet    Take 1 tablet (25 mg) by mouth 2 times daily       vitamin D 2000 UNITS Caps      Take by mouth daily

## 2017-02-24 NOTE — PROGRESS NOTES
HPI and Plan:   See dictation    Orders Placed This Encounter   Procedures     CT Coronary Calcium Scan     Follow-Up with Cardiologist     Exercise Stress Echocardiogram       Orders Placed This Encounter   Medications     Coenzyme Q10 (COQ10) 50 MG CAPS     Sig: Take 70 mg by mouth daily     Nutritional Supplements (GRAPESEED EXTRACT PO)     Sig: Take by mouth daily     Sulfamethoxazole-Trimethoprim (SEPTRA DS PO)     Sig: Take by mouth 2 times daily       Medications Discontinued During This Encounter   Medication Reason     candesartan (ATACAND) 4 MG TABS tablet Medication Reconciliation Clean Up         Encounter Diagnoses   Name Primary?     Essential hypertension with goal blood pressure less than 140/90 Yes     CARDIOVASCULAR SCREENING; LDL GOAL LESS THAN 160      Chest pain, unspecified      Family history of ischemic heart disease        CURRENT MEDICATIONS:  Current Outpatient Prescriptions   Medication Sig Dispense Refill     Coenzyme Q10 (COQ10) 50 MG CAPS Take 70 mg by mouth daily       Nutritional Supplements (GRAPESEED EXTRACT PO) Take by mouth daily       Sulfamethoxazole-Trimethoprim (SEPTRA DS PO) Take by mouth 2 times daily       candesartan (ATACAND) 8 MG TABS Take 1 tablet (8 mg) by mouth daily 90 tablet 1     topiramate (TOPAMAX) 25 MG tablet Take 1 tablet (25 mg) by mouth 2 times daily (Patient taking differently: Take 50 mg by mouth daily ) 180 tablet 3     etonogestrel-ethinyl estradiol (NUVARING) 0.12-0.015 MG/24HR vaginal ring Place 1 each vaginally every 21 days AS DIRECTED, REMOVE FOR 1 WEEK 3 each 4     Cholecalciferol (VITAMIN D) 2000 UNITS CAPS Take by mouth daily        OMEGA-3 CAPS   OR 2 caps Twice daily,  0     MULTI-VITAMIN OR TABS 1 TABLET DAILY 30 0     GLUCOSAMINE 500 MG OR CAPS 1 tab BID  0     PROBIOTICA OR CHEW take one tablet daily  0     [DISCONTINUED] candesartan (ATACAND) 4 MG TABS tablet Take 2 tablets (8 mg) by mouth daily 30 tablet 3       ALLERGIES     Allergies    Allergen Reactions     No Known Drug Allergies        PAST MEDICAL HISTORY:  Past Medical History   Diagnosis Date     Calculus of kidney 1996     Calcium stone - etiology after w/u was excess calcium intake     Family history of other cardiovascular diseases      father with MI at age 58 yo, s/p PTCA     Fertility testing -     used lifetime total of 11 months of Clomid and 2 months of gonadotropins     Volvulus (H) child     Resolved without surgery - hospitalized for 1 day       PAST SURGICAL HISTORY:  Past Surgical History   Procedure Laterality Date     C  delivery only  94,96,99,02     4 C/S       FAMILY HISTORY:  Family History   Problem Relation Age of Onset     Hypertension Mother      OSTEOPOROSIS Mother      dx at age 59 even after being on estrogen since age 50     C.A.D. Father      MI at age 59 and stented -      Hypertension Father      GASTROINTESTINAL DISEASE Father      gallstones and gallstone pancreatitis at age 61     Lipids Father      low HDL     Genitourinary Problems Father      uric acid kidney stone     C.A.D. Paternal Grandfather      bypass in 70's     Depression Paternal Grandfather      in old age     Respiratory Paternal Grandfather      sleep apnea     Hypertension Maternal Grandfather      Alzheimer Disease Maternal Grandfather      in late 70's     GASTROINTESTINAL DISEASE Paternal Grandmother       of gallstone pancreatitis at age 50     CANCER Maternal Grandmother       of ovarian cancer at age 50     Coronary Artery Disease Early Onset Other      father's side of family - late 50's with normal LDLs and BP's        SOCIAL HISTORY:  Social History     Social History     Marital status:      Spouse name: Juan     Number of children: 4     Years of education: 20     Occupational History     physician [employer]     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 [place]      Social History Main Topics     Smoking status: Never Smoker     Smokeless tobacco:  "Never Used     Alcohol use 0.0 oz/week     0 Standard drinks or equivalent per week      Comment: 0-1 wine cooler a week     Drug use: No     Sexual activity: Yes     Partners: Male     Birth control/ protection: Inserts, Pill     Other Topics Concern      Service No     Blood Transfusions No     Caffeine Concern No     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     Hobby Hazards No     Sleep Concern No     Stress Concern No     Weight Concern No     Special Diet No     Back Care No     Exercise No     Bike Helmet Yes     Seat Belt Yes     Self-Exams Yes     Social History Narrative       Review of Systems:  Skin:  Positive for   sores on face, on abx   Eyes:  Positive for   reading glasses  ENT:         Respiratory:  Negative       Cardiovascular:  Negative;palpitations;lightheadedness;dizziness;syncope or near-syncope;cyanosis;exercise intolerance;fatigue;edema Positive for;chest pain    Gastroenterology: Negative      Genitourinary:  Negative      Musculoskeletal:  Negative      Neurologic:  Negative      Psychiatric:  Negative      Heme/Lymph/Imm:  Negative      Endocrine:  Negative        Physical Exam:  Vitals: /76 (BP Location: Right arm, Cuff Size: Adult Regular)  Pulse 80  Ht 1.626 m (5' 4\")  Wt 58.4 kg (128 lb 11.2 oz)  BMI 22.09 kg/m2    Constitutional:           Skin:           Head:           Eyes:           ENT:           Neck:           Chest:             Cardiac:                    Abdomen:           Vascular:                                          Extremities and Back:                 Neurological:                 CC  Patti Purcell MD  [address] [address]                  "

## 2017-02-24 NOTE — LETTER
2/24/2017    Patti Purcell MD  [facility]  [address] [place] [address]    RE: Evita PEDERSON Sotero       Dear Colleague,    I had the pleasure of seeing Evita BG Bey in the [facility].    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        She just recently when on medication for hypertension due to elevated blood pressures upon her clinic visit.  She also has a strong family history of high blood pressure on her mother's side.  She was put on candesartan.  Before she got diagnosed, she was starting to have some symptoms of pressure in the center of her chest.  She has had costochondritis in the past but it did not feel like this to her.  She did not have any burning sensation or metallic aftertaste to suggest reflux disease and has no history of this either.  She felt a sensation of pressure in the midsternal area that kind of felt like it was pushing outward and then also somewhat radiated to her back.  She would notice this both at rest as well as at times of exertion when she was on the treadmill or elliptical.  She did not really seem to have any associated symptoms such as shortness of breath, palpitations, nausea or diaphoresis.  The symptoms did tend to go away on their own.  She thought that it may have been related to high blood pressure and once she started the medication, she continued to have some symptoms.  She did titrate her candesartan antihypertensive medication and thought that this is partially helps, but continues to have very mild chest pain symptoms.  With her family history, she is very concerned this may represent ischemic heart disease and therefore is presenting today.        Also, she again  mentions about her family history and has concerns, not only about what her current symptoms are, but what her future risk may be in relation to future cardiovascular events.        I reviewed her most recent lab work back in October.  She did have mild hyperlipidemia with a total cholesterol of 212, HDL 54, LDL of 137 and triglycerides 105.  Through aggressive lifestyle modifications with diet and exercise, however, a recheck this January showed significant improvement in her numbers with a total cholesterol, dropping to 186, HDL increasing 67, LDL dropping to 97 and triglycerides 112.  This was without any medication.  Electrolyte panel and kidney function all looked normal.  Thyroid studies, hemoglobin and liver function tests look normal as well.  She did have an electrocardiogram in 2017, which I have reviewed.  Essentially this is a normal sinus rhythm.  She does have an RSR pattern normal variant in V1.  The ID interval is slightly short and may represent a short ID syndrome.  She is certainly not complaining of any arrhythmia type symptoms.      PHYSICAL EXAMINATION:   VITAL SIGNS:  On exam today, her blood pressure is 104/76, pulse of 80, weight 128 with a body mass index of 22.   NECK:  Carotid upstrokes are brisk without bruit.   CARDIOVASCULAR:  Tones are regular without a murmur, gallop or rub.   LUNGS:  Clear posteriorly.   EXTREMITIES:  She has strong and symmetric pulses in the upper and lower extremities without peripheral edema.      In reviewing her family history once again, father had a myocardial infarction at age 59, an uncle had an aortic dissection in his 70s, a younger uncle with high blood pressure, paternal grandfather with coronary artery bypass grafting in his 50s.        She is a lifelong nonsmoker, no history of diabetes, kidney disease or previous diagnosis of heart disease.  She is  4, para 4.  She did not have any issues with hypertension or blood sugars during pregnancy.   She uses a NuvaRing for contraception currently, is not taking any hormone therapy and is premenopausal.     Outpatient Encounter Prescriptions as of 2/24/2017   Medication Sig Dispense Refill     Coenzyme Q10 (COQ10) 50 MG CAPS Take 70 mg by mouth daily       Nutritional Supplements (GRAPESEED EXTRACT PO) Take by mouth daily       Sulfamethoxazole-Trimethoprim (SEPTRA DS PO) Take by mouth 2 times daily       candesartan (ATACAND) 8 MG TABS Take 1 tablet (8 mg) by mouth daily 90 tablet 1     topiramate (TOPAMAX) 25 MG tablet Take 1 tablet (25 mg) by mouth 2 times daily (Patient taking differently: Take 50 mg by mouth daily ) 180 tablet 3     etonogestrel-ethinyl estradiol (NUVARING) 0.12-0.015 MG/24HR vaginal ring Place 1 each vaginally every 21 days AS DIRECTED, REMOVE FOR 1 WEEK 3 each 4     Cholecalciferol (VITAMIN D) 2000 UNITS CAPS Take by mouth daily        OMEGA-3 CAPS   OR 2 caps Twice daily,  0     MULTI-VITAMIN OR TABS 1 TABLET DAILY 30 0     GLUCOSAMINE 500 MG OR CAPS 1 tab BID  0     PROBIOTICA OR CHEW take one tablet daily  0     [DISCONTINUED] candesartan (ATACAND) 4 MG TABS tablet Take 2 tablets (8 mg) by mouth daily 30 tablet 3     No facility-administered encounter medications on file as of 2/24/2017.         SUMMARY:  Ms. Banerjee is a very pleasant 46-year-old female with new diagnosis of hypertension, family history of premature coronary disease with some atypical chest pain symptoms and concerns about primary prevention of future cardiovascular events and knowing her risk.  Given her chest pain symptoms that are unexplained, with her risk factors, I would recommend a stress echocardiogram for her at this time and she is agreeable to proceed.        Her second question was more a concern about her future cardiovascular risks, and so we did spend some time today talking about some options that we could look at to improve upon the traditional risk factor scoring profile.  Her father's side of the  family does have significant evidence for cardiovascular disease and atherosclerosis despite no paternal family history of hypercholesterolemia.  One option is to take a closer look at her cholesterol numbers with an NMR profile to see if she has small dense atherogenic LDL despite being able to normalize her numbers from last year.        Another option would be to perform a CT calcium scoring.  This would give an estimation of her vascular age and give us also evidence of whether there is any evidence of subclinical coronary disease.      After discussion, she has opted for the CT calcium scoring and therefore, we will schedule this in the near future as well.  I have scheduled a followup visit to go over the test results with her.        Please feel free to contact me with any questions you have in regards to her care.  Thank you for allowing me to participate in the care of your nice patient.     Sincerely,    Sharee Garcia, DO     [facility]

## 2017-02-24 NOTE — PROGRESS NOTES
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        She just recently when on medication for hypertension due to elevated blood pressures upon her clinic visit.  She also has a strong family history of high blood pressure on her mother's side.  She was put on candesartan.  Before she got diagnosed, she was starting to have some symptoms of pressure in the center of her chest.  She has had costochondritis in the past but it did not feel like this to her.  She did not have any burning sensation or metallic aftertaste to suggest reflux disease and has no history of this either.  She felt a sensation of pressure in the midsternal area that kind of felt like it was pushing outward and then also somewhat radiated to her back.  She would notice this both at rest as well as at times of exertion when she was on the treadmill or elliptical.  She did not really seem to have any associated symptoms such as shortness of breath, palpitations, nausea or diaphoresis.  The symptoms did tend to go away on their own.  She thought that it may have been related to high blood pressure and once she started the medication, she continued to have some symptoms.  She did titrate her candesartan antihypertensive medication and thought that this is partially helps, but continues to have very mild chest pain symptoms.  With her family history, she is very concerned this may represent ischemic heart disease and therefore is presenting today.        Also, she again mentions about her family history and has concerns, not only about what her current symptoms are, but what her future risk may be in relation to future cardiovascular events.        I reviewed her most recent lab work back in  October.  She did have mild hyperlipidemia with a total cholesterol of 212, HDL 54, LDL of 137 and triglycerides 105.  Through aggressive lifestyle modifications with diet and exercise, however, a recheck this January showed significant improvement in her numbers with a total cholesterol, dropping to 186, HDL increasing 67, LDL dropping to 97 and triglycerides 112.  This was without any medication.  Electrolyte panel and kidney function all looked normal.  Thyroid studies, hemoglobin and liver function tests look normal as well.  She did have an electrocardiogram in 2017, which I have reviewed.  Essentially this is a normal sinus rhythm.  She does have an RSR pattern normal variant in V1.  The NH interval is slightly short and may represent a short NH syndrome.  She is certainly not complaining of any arrhythmia type symptoms.      PHYSICAL EXAMINATION:   VITAL SIGNS:  On exam today, her blood pressure is 104/76, pulse of 80, weight 128 with a body mass index of 22.   NECK:  Carotid upstrokes are brisk without bruit.   CARDIOVASCULAR:  Tones are regular without a murmur, gallop or rub.   LUNGS:  Clear posteriorly.   EXTREMITIES:  She has strong and symmetric pulses in the upper and lower extremities without peripheral edema.      In reviewing her family history once again, father had a myocardial infarction at age 59, an uncle had an aortic dissection in his 70s, a younger uncle with high blood pressure, paternal grandfather with coronary artery bypass grafting in his 50s.        She is a lifelong nonsmoker, no history of diabetes, kidney disease or previous diagnosis of heart disease.  She is  4, para 4.  She did not have any issues with hypertension or blood sugars during pregnancy.  She uses a NuvaRing for contraception currently, is not taking any hormone therapy and is premenopausal.      SUMMARY:  Ms. Banerjee is a very pleasant 46-year-old female with new diagnosis of hypertension, family history of  premature coronary disease with some atypical chest pain symptoms and concerns about primary prevention of future cardiovascular events and knowing her risk.  Given her chest pain symptoms that are unexplained, with her risk factors, I would recommend a stress echocardiogram for her at this time and she is agreeable to proceed.        Her second question was more a concern about her future cardiovascular risks, and so we did spend some time today talking about some options that we could look at to improve upon the traditional risk factor scoring profile.  Her father's side of the family does have significant evidence for cardiovascular disease and atherosclerosis despite no paternal family history of hypercholesterolemia.  One option is to take a closer look at her cholesterol numbers with an NMR profile to see if she has small dense atherogenic LDL despite being able to normalize her numbers from last year.        Another option would be to perform a CT calcium scoring.  This would give an estimation of her vascular age and give us also evidence of whether there is any evidence of subclinical coronary disease.      After discussion, she has opted for the CT calcium scoring and therefore, we will schedule this in the near future as well.  I have scheduled a followup visit to go over the test results with her.        Please feel free to contact me with any questions you have in regards to her care.  Thank you for allowing me to participate in the care of your nice patient.      cc:      [place] [address]         MIKE PERRY DO             D: 2017 14:22   T: 2017 14:47   MT: LILIAN      Name:     JANIYA RAMIREZ   MRN:      [id number]        Account:      [id number]   :      1970           Service Date: 2017      Document: [id number]

## 2017-02-28 ENCOUNTER — HOSPITAL ENCOUNTER (OUTPATIENT)
Dept: CARDIOLOGY | Facility: CLINIC | Age: 47
Discharge: HOME | End: 2017-02-28
Attending: INTERNAL MEDICINE | Admitting: INTERNAL MEDICINE
Payer: COMMERCIAL

## 2017-02-28 DIAGNOSIS — Z13.6 CARDIOVASCULAR SCREENING; LDL GOAL LESS THAN 160: ICD-10-CM

## 2017-02-28 DIAGNOSIS — I10 ESSENTIAL HYPERTENSION WITH GOAL BLOOD PRESSURE LESS THAN 140/90: ICD-10-CM

## 2017-02-28 DIAGNOSIS — Z82.49 FAMILY HISTORY OF ISCHEMIC HEART DISEASE: ICD-10-CM

## 2017-02-28 DIAGNOSIS — R07.9 CHEST PAIN, UNSPECIFIED: ICD-10-CM

## 2017-02-28 PROCEDURE — 93325 DOPPLER ECHO COLOR FLOW MAPG: CPT | Mod: 26 | Performed by: INTERNAL MEDICINE

## 2017-02-28 PROCEDURE — 75571 CT HRT W/O DYE W/CA TEST: CPT | Mod: GA

## 2017-02-28 PROCEDURE — 93350 STRESS TTE ONLY: CPT | Mod: 26 | Performed by: INTERNAL MEDICINE

## 2017-02-28 PROCEDURE — 93350 STRESS TTE ONLY: CPT | Mod: TC

## 2017-02-28 PROCEDURE — 93321 DOPPLER ECHO F-UP/LMTD STD: CPT | Mod: 26 | Performed by: INTERNAL MEDICINE

## 2017-02-28 PROCEDURE — 75571 CT HRT W/O DYE W/CA TEST: CPT | Mod: 26 | Performed by: INTERNAL MEDICINE

## 2017-02-28 PROCEDURE — 93016 CV STRESS TEST SUPVJ ONLY: CPT | Performed by: INTERNAL MEDICINE

## 2017-02-28 PROCEDURE — 93018 CV STRESS TEST I&R ONLY: CPT | Performed by: INTERNAL MEDICINE

## 2017-03-09 ENCOUNTER — OFFICE VISIT (OUTPATIENT)
Dept: CARDIOLOGY | Facility: CLINIC | Age: 47
End: 2017-03-09
Attending: INTERNAL MEDICINE

## 2017-03-09 VITALS
DIASTOLIC BLOOD PRESSURE: 78 MMHG | BODY MASS INDEX: 21.68 KG/M2 | SYSTOLIC BLOOD PRESSURE: 106 MMHG | HEIGHT: 64 IN | HEART RATE: 71 BPM | OXYGEN SATURATION: 99 % | WEIGHT: 127 LBS

## 2017-03-09 DIAGNOSIS — I10 ESSENTIAL HYPERTENSION WITH GOAL BLOOD PRESSURE LESS THAN 140/90: ICD-10-CM

## 2017-03-09 DIAGNOSIS — Z82.49 FAMILY HISTORY OF ISCHEMIC HEART DISEASE: ICD-10-CM

## 2017-03-09 DIAGNOSIS — R07.9 CHEST PAIN, UNSPECIFIED: ICD-10-CM

## 2017-03-09 DIAGNOSIS — Z13.6 CARDIOVASCULAR SCREENING; LDL GOAL LESS THAN 160: ICD-10-CM

## 2017-04-04 ENCOUNTER — MYC MEDICAL ADVICE (OUTPATIENT)
Dept: CARDIOLOGY | Facility: CLINIC | Age: 47
End: 2017-04-04

## 2017-05-04 ENCOUNTER — OFFICE VISIT (OUTPATIENT)
Dept: FAMILY MEDICINE | Facility: CLINIC | Age: 47
End: 2017-05-04
Payer: COMMERCIAL

## 2017-05-04 VITALS
WEIGHT: 129 LBS | OXYGEN SATURATION: 100 % | HEART RATE: 96 BPM | SYSTOLIC BLOOD PRESSURE: 126 MMHG | DIASTOLIC BLOOD PRESSURE: 82 MMHG | RESPIRATION RATE: 20 BRPM | HEIGHT: 64 IN | TEMPERATURE: 97.6 F | BODY MASS INDEX: 22.02 KG/M2

## 2017-05-04 DIAGNOSIS — Z71.84 COUNSELING FOR TRAVEL: Primary | ICD-10-CM

## 2017-05-04 PROCEDURE — 99402 PREV MED CNSL INDIV APPRX 30: CPT | Performed by: FAMILY MEDICINE

## 2017-05-04 RX ORDER — ATOVAQUONE AND PROGUANIL HYDROCHLORIDE 250; 100 MG/1; MG/1
1 TABLET, FILM COATED ORAL DAILY
Qty: 33 TABLET | Refills: 0 | Status: SHIPPED | OUTPATIENT
Start: 2017-05-04 | End: 2017-12-21

## 2017-05-04 RX ORDER — ATOVAQUONE AND PROGUANIL HYDROCHLORIDE 250; 100 MG/1; MG/1
1 TABLET, FILM COATED ORAL DAILY
Qty: 14 TABLET | Refills: 0 | Status: SHIPPED | OUTPATIENT
Start: 2017-05-04 | End: 2017-05-04

## 2017-05-04 RX ORDER — CIPROFLOXACIN 500 MG/1
500 TABLET, FILM COATED ORAL 2 TIMES DAILY
Qty: 6 TABLET | Refills: 0 | Status: SHIPPED | OUTPATIENT
Start: 2017-05-04 | End: 2017-12-21

## 2017-05-04 NOTE — NURSING NOTE
"Chief Complaint   Patient presents with     Travel Clinic     [facility]       Initial /82 (BP Location: Right arm, Patient Position: Chair, Cuff Size: Adult Regular)  Pulse 96  Temp 97.6  F (36.4  C) (Oral)  Resp 20  Ht 5' 4\" (1.626 m)  Wt 129 lb (58.5 kg)  SpO2 100%  BMI 22.14 kg/m2 Estimated body mass index is 22.14 kg/(m^2) as calculated from the following:    Height as of this encounter: 5' 4\" (1.626 m).    Weight as of this encounter: 129 lb (58.5 kg).  Medication Reconciliation: complete   Myra Vazquez, YRN      "

## 2017-05-04 NOTE — MR AVS SNAPSHOT
"              After Visit Summary   5/4/2017    DR Evita Bey    MRN: [id number]           Patient Information     Date Of Birth          1970        Visit Information        Provider Department      5/4/2017 1:15 PM Shelly Stahl MD [facility]        Today's Diagnoses     Counseling for travel    -  1       Follow-ups after your visit        Who to contact     If you have questions or need follow up information about today's clinic visit or your schedule please contact [facility] directly at [phone].  Normal or non-critical lab and imaging results will be communicated to you by MyChart, letter or phone within 4 business days after the clinic has received the results. If you do not hear from us within 7 days, please contact the clinic through [facility] or phone. If you have a critical or abnormal lab result, we will notify you by phone as soon as possible.  Submit refill requests through [employer] or call your pharmacy and they will forward the refill request to us. Please allow 3 business days for your refill to be completed.          Additional Information About Your Visit        MyChart Information     [email or web address] gives you secure access to your electronic health record. If you see a primary care provider, you can also send messages to your care team and make appointments. If you have questions, please call your primary care clinic.  If you do not have a primary care provider, please call [phone] and they will assist you.        Care EveryWhere ID     This is your Care EveryWhere ID. This could be used by other organizations to access your [place] medical records  [id number]        Your Vitals Were     Pulse Temperature Respirations Height Pulse Oximetry BMI (Body Mass Index)    96 97.6  F (36.4  C) (Oral) 20 5' 4\" (1.626 m) 100% 22.14 kg/m2       Blood Pressure from Last 3 Encounters:   05/04/17 126/82   03/09/17 106/78   02/24/17 104/76    Weight from Last 3 " Encounters:   05/04/17 129 lb (58.5 kg)   03/09/17 127 lb (57.6 kg)   02/24/17 128 lb 11.2 oz (58.4 kg)              Today, you had the following     No orders found for display         Today's Medication Changes          These changes are accurate as of: 5/4/17  4:15 PM.  If you have any questions, ask your nurse or doctor.               Start taking these medicines.        Dose/Directions    atovaquone-proguanil 250-100 MG per tablet   Commonly known as:  MALARONE   Used for:  Counseling for travel   Started by:  Shelly Stahl MD        Dose:  1 tablet   Take 1 tablet by mouth daily Start 2 days before travel and continue 7 days after return.   Quantity:  33 tablet   Refills:  0       ciprofloxacin 500 MG tablet   Commonly known as:  CIPRO   Used for:  Counseling for travel   Started by:  Shelly Stahl MD        Dose:  500 mg   Take 1 tablet (500 mg) by mouth 2 times daily   Quantity:  6 tablet   Refills:  0         These medicines have changed or have updated prescriptions.        Dose/Directions    topiramate 25 MG tablet   Commonly known as:  TOPAMAX   This may have changed:    - how much to take  - when to take this   Used for:  Episodic tension-type headache, not intractable        Dose:  25 mg   Take 1 tablet (25 mg) by mouth 2 times daily   Quantity:  180 tablet   Refills:  3            Where to get your medicines      These medications were sent to [facility] [place] [id number]     Phone:  [phone]     atovaquone-proguanil 250-100 MG per tablet    ciprofloxacin 500 MG tablet                Primary Care Provider Office Phone # Fax #    Patti Purcell [phone]       [address] [address]        Thank you!     Thank you for choosing [facility]  for your care. Our goal is always to provide you with excellent care. Hearing back  from our patients is one way we can continue to improve our services. Please take a few minutes to complete the written survey that you may receive in the mail after your visit with us. Thank you!             Your Updated Medication List - Protect others around you: Learn how to safely use, store and throw away your medicines at www.disposemymeds.org.          This list is accurate as of: 5/4/17  4:15 PM.  Always use your most recent med list.                   Brand Name Dispense Instructions for use    atovaquone-proguanil 250-100 MG per tablet    MALARONE    33 tablet    Take 1 tablet by mouth daily Start 2 days before travel and continue 7 days after return.       candesartan 8 MG Tabs    ATACAND    90 tablet    Take 1 tablet (8 mg) by mouth daily       ciprofloxacin 500 MG tablet    CIPRO    6 tablet    Take 1 tablet (500 mg) by mouth 2 times daily       CoQ10 50 MG Caps      Take 70 mg by mouth daily       etonogestrel-ethinyl estradiol 0.12-0.015 MG/24HR vaginal ring    NUVARING    3 each    Place 1 each vaginally every 21 days AS DIRECTED, REMOVE FOR 1 WEEK       glucosamine 500 MG Caps      1 tab BID       GRAPESEED EXTRACT PO      Take by mouth daily       Multi-vitamin Tabs tablet   Generic drug:  multivitamin, therapeutic with minerals     30    1 TABLET DAILY       OMEGA-3 CAPS   OR      2 caps Twice daily,       PROBIOTICA Chew      take one tablet daily       topiramate 25 MG tablet    TOPAMAX    180 tablet    Take 1 tablet (25 mg) by mouth 2 times daily       vitamin D 2000 UNITS Caps      Take by mouth daily

## 2017-05-04 NOTE — PROGRESS NOTES
Itinerary:  [facility]    Departure Date: 6/9/17 & 8/16/17    IMMUNIZATION HISTORY  Have you ever fainted from having your blood drawn or from an injection?  Yes  Have you ever had a fever reaction to vaccination?  No  Have you ever had any bad reaction or side effect from any vaccination?  No  Have you ever had hepatitis A or B vaccine?  Yes  Do you live (or work closely) with anyone who has AIDS, an AIDS-like condition, any other immune disorder or who is on chemotherapy for cancer?  No  Do you have a family history of immunodeficiency?  No  Have you received any injection of immune globulin or any blood products during the past 12 months?  No    GENERAL MEDICAL HISTORY  Do you have a medical condition that warrants maintenance medication or physician follow-up?  Yes  Do you have a medical condition that is stable now, but that may recur while traveling?  No  Have you had a fever in the past 48 hours?  yes  Are you pregnant, or might you become pregnant on this trip?  No  Do you have AIDS, an AIDS-like condition, any other immune disorder, leukemia or cancer?  No  Have you had your thymus gland removed or a history of problems with your thymus, such as myasthenia gravis, DiGeorge syndrome, or thymoma?  No  Do you have severe thrombocytopenia (low platelet count) or a coagulation disorder?  No  Have you ever had a convulsion, seizure, epilepsy, neurologic condition or brain infection?  No  Do you have any stomach conditions?  No  Do you have a G6PD deficiency?  No  Do you have severe renal impairment?  No  Do you have bowel conditions such as diarrhea or constipation?  No  Have you ever had hepatitis or yellow jaundice?  No  Do you have a history of psychiatric problems?  No  Do you have a problem with strange dreams and/or nightmares?  No  Do you have insomnia?  No  Do you have problems with vaginitis?  No  Do you have psoriasis?  No  Have you or a member of your household ever been diagnosed with  eczema or atopic dermatitis (e.g. Itchy, red, scaly rash lasting >2 weeks that often comes and goes)?  No  Cardiac disease, with or without symptoms?  No  Do you have any eye conditions?  No  Are you prone to motion sickness?  Yes        Subjective:  Patient here for immunizations prior to traveling to [facility] and [facility].  Patient denies any pain. symptoms of fever, cough or URI.  Objective:  Travel itinerary and immunization history completed.  Assessment:  International travel medical questionnaire completed.  Ok to give vaccines.  Plan:  Cipro twice daily and Malarone once daily is given for travelers diarrhea and malaria prophylaxis per protocol.  Patient education reviewed and given to Evita.  Post Travel Period sheet discussed.  Evita advised to stay after injection per protocol.  the visit lasted for 30 minute, more than half of it was one on one with patient,

## 2017-08-02 DIAGNOSIS — A09 TRAVELER'S DIARRHEA: Primary | ICD-10-CM

## 2017-08-02 RX ORDER — AZITHROMYCIN 500 MG/1
1000 TABLET, FILM COATED ORAL DAILY
Qty: 6 TABLET | Refills: 0 | Status: SHIPPED | OUTPATIENT
Start: 2017-08-02 | End: 2017-12-21

## 2017-08-02 NOTE — TELEPHONE ENCOUNTER
Provider please review, edit, and sign prescription request.     Danielle MCDANIEL RN, BSN, PHN  [facility] RN

## 2017-12-05 ENCOUNTER — HOSPITAL ENCOUNTER (OUTPATIENT)
Dept: MAMMOGRAPHY | Facility: CLINIC | Age: 47
Discharge: HOME | End: 2017-12-05
Attending: OBSTETRICS & GYNECOLOGY | Admitting: OBSTETRICS & GYNECOLOGY
Payer: COMMERCIAL

## 2017-12-05 DIAGNOSIS — Z12.31 VISIT FOR SCREENING MAMMOGRAM: ICD-10-CM

## 2017-12-05 PROCEDURE — G0202 SCR MAMMO BI INCL CAD: HCPCS

## 2017-12-19 ENCOUNTER — HOSPITAL ENCOUNTER (OUTPATIENT)
Dept: ULTRASOUND IMAGING | Facility: CLINIC | Age: 47
Discharge: HOME | End: 2017-12-19
Attending: OBSTETRICS & GYNECOLOGY | Admitting: OBSTETRICS & GYNECOLOGY
Payer: COMMERCIAL

## 2017-12-19 DIAGNOSIS — Z80.41 FAMILY HISTORY OF OVARIAN CANCER: ICD-10-CM

## 2017-12-19 DIAGNOSIS — N93.9 ABNORMAL UTERINE BLEEDING (AUB): ICD-10-CM

## 2017-12-19 PROCEDURE — 76830 TRANSVAGINAL US NON-OB: CPT

## 2017-12-19 NOTE — PROGRESS NOTES
Dr Bey-, the Ultrasound is normal.  I see you have an appointment with Dr Hennessy on Thurs 12/21/17.  Please let me know if you have any questions in the interval  thanks  Jon Nazario M.D.

## 2017-12-21 ENCOUNTER — OFFICE VISIT (OUTPATIENT)
Dept: OBGYN | Facility: CLINIC | Age: 47
End: 2017-12-21
Payer: COMMERCIAL

## 2017-12-21 VITALS
DIASTOLIC BLOOD PRESSURE: 68 MMHG | SYSTOLIC BLOOD PRESSURE: 118 MMHG | HEART RATE: 64 BPM | BODY MASS INDEX: 22.02 KG/M2 | WEIGHT: 129 LBS | HEIGHT: 64 IN

## 2017-12-21 DIAGNOSIS — Z30.011 ENCOUNTER FOR INITIAL PRESCRIPTION OF CONTRACEPTIVE PILLS: Primary | ICD-10-CM

## 2017-12-21 PROCEDURE — 99214 OFFICE O/P EST MOD 30 MIN: CPT | Performed by: OBSTETRICS & GYNECOLOGY

## 2017-12-21 RX ORDER — NORETHINDRONE ACETATE AND ETHINYL ESTRADIOL .02; 1 MG/1; MG/1
1 TABLET ORAL DAILY
Qty: 63 TABLET | Refills: 3 | Status: SHIPPED | OUTPATIENT
Start: 2017-12-21 | End: 2019-01-11

## 2017-12-21 NOTE — MR AVS SNAPSHOT
After Visit Summary   12/21/2017    DR Evita eBy    MRN: [id number]           Patient Information     Date Of Birth          1970        Visit Information        Provider Department      12/21/2017 1:00 PM Sujey Hennessy MD [facility]        Today's Diagnoses     Encounter for initial prescription of contraceptive pills    -  1       Follow-ups after your visit        Your next 10 appointments already scheduled     Jan 11, 2018  9:30 AM [facility] Problem Visit with Sujey Hennessy MD   [facility] ([facility])    [address]  [facility] [id number] [phone]              Who to contact     If you have questions or need follow up information about today's clinic visit or your schedule please contact [facility] directly at [phone].  Normal or non-critical lab and imaging results will be communicated to you by [employer], letter or phone within 4 business days after the clinic has received the results. If you do not hear from us within 7 days, please contact the clinic through [employer] or phone. If you have a critical or abnormal lab result, we will notify you by phone as soon as possible.  Submit refill requests through [employer] or call your pharmacy and they will forward the refill request to us. Please allow 3 business days for your refill to be completed.          Additional Information About Your Visit        MyChart Information     [employer] gives you secure access to your electronic health record. If you see a primary care provider, you can also send messages to your care team and make appointments. If you have questions, please call your primary care clinic.  If you do not have a primary care provider, please call [phone] and they will assist you.        Care EveryWhere ID     This is your Care EveryWhere ID. This could be used by other organizations to access your [facility]  "records  [id number]        Your Vitals Were     Pulse Height Last Period Breastfeeding? BMI (Body Mass Index)       64 5' 4\" (1.626 m) 10/21/2017 No 22.14 kg/m2        Blood Pressure from Last 3 Encounters:   12/21/17 118/68   05/04/17 126/82   03/09/17 106/78    Weight from Last 3 Encounters:   12/21/17 129 lb (58.5 kg)   05/04/17 129 lb (58.5 kg)   03/09/17 127 lb (57.6 kg)              Today, you had the following     No orders found for display         Today's Medication Changes          These changes are accurate as of: 12/21/17  5:10 PM.  If you have any questions, ask your nurse or doctor.               Start taking these medicines.        Dose/Directions    norethindrone-ethinyl estradiol 1-20 MG-MCG per tablet   Commonly known as:  MICROGESTIN 1/20   Used for:  Encounter for initial prescription of contraceptive pills   Started by:  Sujey Hennessy MD        Dose:  1 tablet   Take 1 tablet by mouth daily   Quantity:  63 tablet   Refills:  3         These medicines have changed or have updated prescriptions.        Dose/Directions    topiramate 25 MG tablet   Commonly known as:  TOPAMAX   This may have changed:    - how much to take  - when to take this   Used for:  Episodic tension-type headache, not intractable        Dose:  25 mg   Take 1 tablet (25 mg) by mouth 2 times daily   Quantity:  180 tablet   Refills:  3            Where to get your medicines      These medications were sent to [address] [address]     Phone:  [phone]     norethindrone-ethinyl estradiol 1-20 MG-MCG per tablet                Primary Care Provider Office Phone # Fax #    Patti Purcell [phone]       [address] [place] [id number]        Equal Access to Services     RONNIE ENGLE AH: Jacob avilez Soantonia, wadaniellada luqadaha, qaybta kaalbronson macias, cathy zepeda " vargheseronald laTheeaan ah. So [facility] [phone].    ATENCIÓN: Si habla luis f, tiene a hernandez disposición servicios gratuitos de asistencia lingüística. Espinoza wilson [phone].    We comply with applicable federal civil rights laws and [place] laws. We do not discriminate on the basis of race, color, national origin, age, disability, sex, sexual orientation, or gender identity.            Thank you!     Thank you for choosing [facility]  for your care. Our goal is always to provide you with excellent care. Hearing back from our patients is one way we can continue to improve our services. Please take a few minutes to complete the written survey that you may receive in the mail after your visit with us. Thank you!             Your Updated Medication List - Protect others around you: Learn how to safely use, store and throw away your medicines at www.disposemymeds.org.          This list is accurate as of: 12/21/17  5:10 PM.  Always use your most recent med list.                   Brand Name Dispense Instructions for use Diagnosis    candesartan 8 MG Tabs    ATACAND    90 tablet    Take 1 tablet (8 mg) by mouth daily    Essential hypertension with goal blood pressure less than 140/90       CoQ10 50 MG Caps      Take 70 mg by mouth daily        etonogestrel-ethinyl estradiol 0.12-0.015 MG/24HR vaginal ring    NUVARING    3 each    Place 1 each vaginally every 21 days AS DIRECTED, REMOVE FOR 1 WEEK    Encounter for initial prescription of vaginal ring hormonal contraceptive       glucosamine 500 MG Caps      1 tab BID    Other general counseling and advice for contraceptive management, Routine gynecological examination       GRAPESEED EXTRACT PO      Take by mouth daily        Multi-vitamin Tabs tablet   Generic drug:  multivitamin, therapeutic with minerals     30    1 TABLET DAILY    Other general counseling and advice for contraceptive management, Routine gynecological examination       norethindrone-ethinyl  estradiol 1-20 MG-MCG per tablet    MICROGESTIN 1/20    63 tablet    Take 1 tablet by mouth daily    Encounter for initial prescription of contraceptive pills       OMEGA-3 CAPS   OR      2 caps Twice daily,    Other general counseling and advice for contraceptive management, Routine gynecological examination       PROBIOTICA Chew      take one tablet daily    Other general counseling and advice for contraceptive management, Routine gynecological examination       topiramate 25 MG tablet    TOPAMAX    180 tablet    Take 1 tablet (25 mg) by mouth 2 times daily    Episodic tension-type headache, not intractable       vitamin D 2000 UNITS Caps      Take by mouth daily

## 2017-12-21 NOTE — PROGRESS NOTES
SUBJECTIVE:                                                     Evita Bey is a 47 year old female  who presents to clinic today for spotting after intercourse.  This has been going on for 3 or 4 months. she notices just a slight pinkish tinge after wiping.  She had her  see his doctor, to determine if maybe this was from the semen, but that workup was negative.  She has been using a NuvaRing for birth control for the last 10 years, and has been very happy with it.  She does not have breakthrough bleeding at any other time.  She does not usually have pain with intercourse, though occasionally she notices dryness.  Menses are very light.  She typically uses the NuvaRing on a continuous basis until she has bleeding, then removes it for a week.  She does have a family history of ovarian cancer in a maternal grandmother.  Her mother is healthy.  No one with BRCA testing in the family.  She had an ultrasound prior to this appointment which was completely normal.    She denies abnormal vaginal discharge or burning.  No change in vaginal odor.  No other symptoms.    Problem list and histories reviewed & adjusted, as indicated.  Additional history: as documented.    Patient Active Problem List   Diagnosis     Family history of ischemic heart disease - father's side of family - with normal lipids     Family history of ovarian cancer - following with Dr. Stuart - ? BSO in late 40's      CARDIOVASCULAR SCREENING; LDL GOAL LESS THAN 160     Essential hypertension with goal blood pressure less than 140/90     Chest pain, unspecified     Past Surgical History:   Procedure Laterality Date     C  DELIVERY ONLY  94,96,99,02    4 C/S      Social History   Substance Use Topics     Smoking status: Never Smoker     Smokeless tobacco: Never Used     Alcohol use 0.0 oz/week     0 Standard drinks or equivalent per week      Comment: 0-1 wine cooler a week      Problem (# of Occurrences) Relation (Name,Age of  Onset)    Alzheimer Disease (1) Maternal Grandfather: in late 70's    C.A.D. (2) Father: MI at age 59 and stented - , Paternal Grandfather: bypass in 70's    CANCER (1) Maternal Grandmother:  of ovarian cancer at age 50    Coronary Artery Disease Early Onset (1) Other: father's side of family - late 50's with normal LDLs and BP's     Depression (1) Paternal Grandfather: in old age    GASTROINTESTINAL DISEASE (2) Father: gallstones and gallstone pancreatitis at age 61, Paternal Grandmother:  of gallstone pancreatitis at age 50    Genitourinary Problems (1) Father: uric acid kidney stone    Hypertension (3) Mother, Father, Maternal Grandfather    Lipids (1) Father: low HDL    OSTEOPOROSIS (1) Mother: dx at age 59 even after being on estrogen since age 50    Respiratory (1) Paternal Grandfather: sleep apnea              Current Outpatient Prescriptions on File Prior to Visit:  Coenzyme Q10 (COQ10) 50 MG CAPS Take 70 mg by mouth daily   Nutritional Supplements (GRAPESEED EXTRACT PO) Take by mouth daily   candesartan (ATACAND) 8 MG TABS Take 1 tablet (8 mg) by mouth daily   topiramate (TOPAMAX) 25 MG tablet Take 1 tablet (25 mg) by mouth 2 times daily (Patient taking differently: Take 50 mg by mouth daily )   etonogestrel-ethinyl estradiol (NUVARING) 0.12-0.015 MG/24HR vaginal ring Place 1 each vaginally every 21 days AS DIRECTED, REMOVE FOR 1 WEEK   Cholecalciferol (VITAMIN D) 2000 UNITS CAPS Take by mouth daily    OMEGA-3 CAPS   OR 2 caps Twice daily,   MULTI-VITAMIN OR TABS 1 TABLET DAILY   GLUCOSAMINE 500 MG OR CAPS 1 tab BID   PROBIOTICA OR CHEW take one tablet daily     No current facility-administered medications on file prior to visit.   Allergies   Allergen Reactions     No Known Drug Allergies        ROS:  5 point ROS negative except as noted in HPI.    OBJECTIVE:     Exam:  Constitutional:  Appearance: Well nourished, well developed alert, in no acute distress  Chest:  Respiratory Effort:  Breathing  unlabored  Cardiovascular: no edema.  Lymphatic: no inguinal lymphadenopathy present  Skin:General Inspection:  No rashes present, no lesions present, no areas of discoloration  Neurologic/Psychiatric:  Mental Status:  Oriented X3   Pelvic Exam:  External Genitalia:     Normal appearance for age, no discharge present, no tenderness present, no inflammatory lesions present, color normal  Vagina:     Normal vaginal vault without central or paravaginal defects, no discharge present, wet prep obtained. There is a small area of redness/erosion near the anterior edge of the Nuvaring, just to the right of midline (approx 10:00 position) about 2 cm inside the introitus.)  Bladder:     Nontender to palpation  Urethra:   Urethral Body:  Urethra palpation normal, urethra structural support normal   Urethral Meatus:  No erythema or lesions present  Cervix:     Appearance healthy, no lesions present, nontender to palpation, no bleeding present  Pubic Hair:     Normal pubic hair distribution for age  Genitalia and Groin:     No rashes present, no lesions present, no areas of discoloration, no masses present        In-Clinic Test Results:  No results found for this or any previous visit (from the past 24 hour(s)).    ASSESSMENT/PLAN:                                                        ICD-10-CM    1. Encounter for initial prescription of contraceptive pills Z30.011 norethindrone-ethinyl estradiol (MICROGESTIN 1/20) 1-20 MG-MCG per tablet     I do think the issue may be the NuvaRing.  She may need to remove it on a more regular basis.  Plan will be to switch to an oral contraceptive for several months and then try switching back to the NuvaRing if things are better.  If the spotting continues, she should contact me or Dr. Stuart when he is back, for further workup if needed.      Sujey Hennessy MD  [facility]

## 2017-12-21 NOTE — NURSING NOTE
"Chief Complaint   Patient presents with     Abnormal Uterine Bleeding     Spotting after IC. Recent US normal. Denies pain. Using NuvaRing continuously for BC.         Initial /68 (BP Location: Right arm, Patient Position: Sitting, Cuff Size: Adult Regular)  Pulse 64  Ht 5' 4\" (1.626 m)  Wt 129 lb (58.5 kg)  LMP 10/21/2017  Breastfeeding? No  BMI 22.14 kg/m2 Estimated body mass index is 22.14 kg/(m^2) as calculated from the following:    Height as of this encounter: 5' 4\" (1.626 m).    Weight as of this encounter: 129 lb (58.5 kg).  Medication Reconciliation: complete   Norah Menard LPN      "

## 2017-12-26 DIAGNOSIS — I10 ESSENTIAL HYPERTENSION WITH GOAL BLOOD PRESSURE LESS THAN 140/90: ICD-10-CM

## 2017-12-28 RX ORDER — CANDESARTAN 8 MG/1
TABLET ORAL
Qty: 90 TABLET | Refills: 0 | Status: SHIPPED | OUTPATIENT
Start: 2017-12-28 | End: 2018-03-01

## 2017-12-28 NOTE — TELEPHONE ENCOUNTER
Requested Prescriptions   Pending Prescriptions Disp Refills     candesartan (ATACAND) 8 MG TABS [Pharmacy Med Name: CANDESARTAN CILEXETIL 8MG TABS] 90 tablet 1     Sig: TAKE ONE TABLET BY MOUTH EVERY DAY    Angiotensin-II Receptors Passed    12/26/2017 10:39 AM       Passed - Blood pressure under 140/90 in past 12 months.    BP Readings from Last 3 Encounters:   12/21/17 118/68   05/04/17 126/82   03/09/17 106/78                Passed - Recent or future visit with authorizing provider's specialty    Patient had office visit in the last year or has a visit in the next 30 days with authorizing provider.  See chart review.              Passed - Patient is age 18 or older       Passed - No active pregnancy on record       Passed - Normal serum creatinine on file in past 12 months    Recent Labs   Lab Test  01/26/17   1138   CR  0.97            Passed - Normal serum potassium on file in past 12 months    Recent Labs   Lab Test  01/26/17   1138   POTASSIUM  3.7                   Passed - No positive pregnancy test in past 12 months

## 2018-01-31 DIAGNOSIS — Z20.828 EXPOSURE TO INFLUENZA: Primary | ICD-10-CM

## 2018-01-31 RX ORDER — OSELTAMIVIR PHOSPHATE 75 MG/1
75 CAPSULE ORAL 2 TIMES DAILY
Qty: 10 CAPSULE | Refills: 0 | Status: SHIPPED | OUTPATIENT
Start: 2018-01-31 | End: 2018-03-01

## 2018-03-01 ENCOUNTER — OFFICE VISIT (OUTPATIENT)
Dept: FAMILY MEDICINE | Facility: CLINIC | Age: 48
End: 2018-03-01
Payer: COMMERCIAL

## 2018-03-01 VITALS
HEIGHT: 65 IN | DIASTOLIC BLOOD PRESSURE: 76 MMHG | BODY MASS INDEX: 21.76 KG/M2 | OXYGEN SATURATION: 100 % | WEIGHT: 130.6 LBS | HEART RATE: 75 BPM | SYSTOLIC BLOOD PRESSURE: 120 MMHG | TEMPERATURE: 98.2 F

## 2018-03-01 DIAGNOSIS — G44.219 EPISODIC TENSION-TYPE HEADACHE, NOT INTRACTABLE: ICD-10-CM

## 2018-03-01 DIAGNOSIS — Z12.11 SCREENING FOR COLON CANCER: ICD-10-CM

## 2018-03-01 DIAGNOSIS — Z13.6 CARDIOVASCULAR SCREENING; LDL GOAL LESS THAN 130: ICD-10-CM

## 2018-03-01 DIAGNOSIS — I10 ESSENTIAL HYPERTENSION WITH GOAL BLOOD PRESSURE LESS THAN 140/90: Primary | ICD-10-CM

## 2018-03-01 DIAGNOSIS — K58.0 IRRITABLE BOWEL SYNDROME WITH DIARRHEA: ICD-10-CM

## 2018-03-01 LAB
ANION GAP SERPL CALCULATED.3IONS-SCNC: 9 MMOL/L (ref 3–14)
BUN SERPL-MCNC: 21 MG/DL (ref 7–30)
CALCIUM SERPL-MCNC: 8.6 MG/DL (ref 8.5–10.1)
CHLORIDE SERPL-SCNC: 107 MMOL/L (ref 94–109)
CHOLEST SERPL-MCNC: 167 MG/DL
CO2 SERPL-SCNC: 22 MMOL/L (ref 20–32)
CREAT SERPL-MCNC: 0.92 MG/DL (ref 0.52–1.04)
ERYTHROCYTE [DISTWIDTH] IN BLOOD BY AUTOMATED COUNT: 12 % (ref 10–15)
GFR SERPL CREATININE-BSD FRML MDRD: 64 ML/MIN/1.7M2
GLUCOSE SERPL-MCNC: 85 MG/DL (ref 70–99)
HCT VFR BLD AUTO: 38.3 % (ref 35–47)
HDLC SERPL-MCNC: 48 MG/DL
HGB BLD-MCNC: 12.8 G/DL (ref 11.7–15.7)
LDLC SERPL CALC-MCNC: 99 MG/DL
MCH RBC QN AUTO: 31 PG (ref 26.5–33)
MCHC RBC AUTO-ENTMCNC: 33.4 G/DL (ref 31.5–36.5)
MCV RBC AUTO: 93 FL (ref 78–100)
NONHDLC SERPL-MCNC: 119 MG/DL
PLATELET # BLD AUTO: 219 10E9/L (ref 150–450)
POTASSIUM SERPL-SCNC: 3.5 MMOL/L (ref 3.4–5.3)
RBC # BLD AUTO: 4.13 10E12/L (ref 3.8–5.2)
SODIUM SERPL-SCNC: 138 MMOL/L (ref 133–144)
TRIGL SERPL-MCNC: 100 MG/DL
TSH SERPL DL<=0.005 MIU/L-ACNC: 1.33 MU/L (ref 0.4–4)
WBC # BLD AUTO: 9 10E9/L (ref 4–11)

## 2018-03-01 PROCEDURE — 84443 ASSAY THYROID STIM HORMONE: CPT | Performed by: FAMILY MEDICINE

## 2018-03-01 PROCEDURE — 85027 COMPLETE CBC AUTOMATED: CPT | Performed by: FAMILY MEDICINE

## 2018-03-01 PROCEDURE — 82043 UR ALBUMIN QUANTITATIVE: CPT | Performed by: FAMILY MEDICINE

## 2018-03-01 PROCEDURE — 80048 BASIC METABOLIC PNL TOTAL CA: CPT | Performed by: FAMILY MEDICINE

## 2018-03-01 PROCEDURE — 80061 LIPID PANEL: CPT | Performed by: FAMILY MEDICINE

## 2018-03-01 PROCEDURE — 99214 OFFICE O/P EST MOD 30 MIN: CPT | Performed by: FAMILY MEDICINE

## 2018-03-01 PROCEDURE — 36415 COLL VENOUS BLD VENIPUNCTURE: CPT | Performed by: FAMILY MEDICINE

## 2018-03-01 RX ORDER — CANDESARTAN 8 MG/1
8 TABLET ORAL DAILY
Qty: 90 TABLET | Refills: 3 | Status: SHIPPED | OUTPATIENT
Start: 2018-03-01 | End: 2019-03-15

## 2018-03-01 RX ORDER — TOPIRAMATE 50 MG/1
50 TABLET, FILM COATED ORAL DAILY
Qty: 90 TABLET | Refills: 3 | Status: SHIPPED | OUTPATIENT
Start: 2018-03-01 | End: 2019-03-15

## 2018-03-01 NOTE — PATIENT INSTRUCTIONS
Perhaps try green tea, instead of coffee for your caffeine and cancer/dementia prevention.   See Patient Instructions. Continue with your excellent exercise program.  Mother recently diagnosed with bladder cancer, had some gallbladder dysplasia.      Recheck with me again in 1 year or sooner, if needed.                Thank you for choosing [facility]  for your Health Care. It was a pleasure seeing you at your visit today. Please contact us with any questions or concerns you may have.                   Patti Purcell MD                                  To reach your [facility] care team after hours call:   [phone]    Our clinic hours are:     Monday- 7:30 am - 7:00 pm                             Tuesday through Friday- 7:30 am - 5:00 pm                                        Saturday- 8:00 am - 12:00 pm                  Phone:  [phone]    Our pharmacy hours are:     Monday  8:00 am to 7:00 pm      Tuesday through Friday 8:00am to 6:00pm                        Saturday - 9:00 am to 1:00 pm      Sunday : Closed.              Phone:  [phone]      There is also information available at our web site:  [email or web address]    If your provider ordered any lab tests and you do not receive the results within 10 business days, please call the clinic.    If you need a medication refill please contact your pharmacy.  Please allow 2 business days for your refill to be completed.    Our clinic offers telephone visits and e visits.  Please ask one of your team members to explain more.      Use [employer] (secure email communication and access to your chart) to send your primary care provider a message or make an appointment. Ask someone on your Team how to sign up for [employer].

## 2018-03-01 NOTE — PROGRESS NOTES
"  SUBJECTIVE:                                                    Evita Bey is a 47 year old female who presents to clinic today for the following health issues:    Hypertension Follow-up      Outpatient blood pressures are not being checked.    Low Salt Diet: not monitoring salt    BP Readings from Last 5 Encounters:   03/01/18 120/76   12/21/17 118/68   05/04/17 126/82   03/09/17 106/78   02/24/17 104/76         Amount of exercise or physical activity: 3-4 days/week for an average of greater than 60 minutes    Problems taking medications regularly: No    Medication side effects: none    Diet: regular (no restrictions)        Problem list and histories reviewed & adjusted, as indicated.  Additional history: as documented    Reviewed and updated as needed this visit by clinical staff  Tobacco  Allergies  Meds  Problems  Med Hx  Surg Hx  Fam Hx  Soc Hx        Reviewed and updated as needed this visit by Provider  Problems        Wt Readings from Last 5 Encounters:   03/01/18 130 lb 9.6 oz (59.2 kg)   12/21/17 129 lb (58.5 kg)   05/04/17 129 lb (58.5 kg)   03/09/17 127 lb (57.6 kg)   02/24/17 128 lb 11.2 oz (58.4 kg)         ROS:  Had to stop nuvaring secondary to vaginal bleeding with intercourse and mild erosions from the nuvaring - had to switch back to ocp's.  Headaches now 2x/month - slight increase from previous.   Started to drink 1 cup of coffee daily in 11/2017, started to have some increased stool frequency and some mild IBS with diarrhea.   Stopped caffeine x 2-3 weeks now= noticed bowel changes.   Will get colonoscopy at age 48.    ROS: 12 point ROS neg other than the symptoms noted above.      OBJECTIVE:                                                    /76 (BP Location: Left arm, Patient Position: Chair, Cuff Size: Adult Regular)  Pulse 75  Temp 98.2  F (36.8  C) (Oral)  Ht 5' 4.5\" (1.638 m)  Wt 130 lb 9.6 oz (59.2 kg)  LMP 02/20/2018 (Exact Date)  SpO2 100%  BMI 22.07 kg/m2  Body " mass index is 22.07 kg/(m^2).   GENERAL: healthy, alert, well nourished, well hydrated, no distress  HENT: ear canals- normal; TMs- normal; Nose- normal; Mouth- no ulcers, no lesions  NECK: no tenderness, no adenopathy, no asymmetry, no masses, no stiffness; thyroid- normal to palpation  RESP: lungs clear to auscultation - no rales, no rhonchi, no wheezes  CV: regular rates and rhythm, normal S1 S2, no S3 or S4 and no murmur, no click or rub -  ABDOMEN: soft, no tenderness, no  hepatosplenomegaly, no masses, normal bowel sounds  MS: extremities- no gross deformities noted, no edema.     Diagnostic test results:  basic metabolic panel  Last Basic Metabolic Panel:  Lab Results   Component Value Date     01/26/2017      Lab Results   Component Value Date    POTASSIUM 3.7 01/26/2017     Lab Results   Component Value Date    CHLORIDE 106 01/26/2017     Lab Results   Component Value Date    SEKOU 9.1 01/26/2017     Lab Results   Component Value Date    CO2 21 01/26/2017     Lab Results   Component Value Date    BUN 16 01/26/2017     Lab Results   Component Value Date    CR 0.97 01/26/2017     Lab Results   Component Value Date    GLC 86 01/26/2017     See epic orders.   Recent Labs   Lab Test  01/06/17   1028  10/23/15   1041   CHOL  186  212*   HDL  67  54   LDL  97  137*   TRIG  112  105   CHOLHDLRATIO   --   3.9             ASSESSMENT/PLAN:                                                        ICD-10-CM    1. Essential hypertension with goal blood pressure less than 140/90 I10 BASIC METABOLIC PANEL     TSH with free T4 reflex     Albumin Random Urine Quantitative with Creat Ratio     candesartan (ATACAND) 8 MG TABS     CANCELED: Albumin Random Urine Quantitative with Creat Ratio   2. Screening for colon cancer Z12.11 GASTROENTEROLOGY ADULT REF PROCEDURE ONLY Liv Zuniga [phone]; [facility]     Fecal colorectal cancer screen FIT   3. Irritable bowel syndrome with diarrhea K58.0    4.  CARDIOVASCULAR SCREENING; LDL GOAL LESS THAN 130 Z13.6 CBC with platelets     Lipid panel reflex to direct LDL Fasting   5. Episodic tension-type headache, not intractable G44.219 topiramate (TOPAMAX) 50 MG tablet     Perhaps try green tea, instead of coffee for your caffeine and cancer/dementia prevention.   See Patient Instructions. Continue with your excellent exercise program.  Mother recently diagnosed with bladder cancer, had some gallbladder dysplasia in the past.          Patti Purcell MD  [facility]

## 2018-03-01 NOTE — MR AVS SNAPSHOT
After Visit Summary   3/1/2018    DR Evita PEDERSON Sotero    MRN: [id number]           Patient Information     Date Of Birth          1970        Visit Information        Provider Department      3/1/2018 2:00 PM Patti Purcell MD [facility]        Today's Diagnoses     Essential hypertension with goal blood pressure less than 140/90    -  1    Screening for colon cancer        Irritable bowel syndrome with diarrhea        CARDIOVASCULAR SCREENING; LDL GOAL LESS THAN 130          Care Instructions    Perhaps try green tea, instead of coffee for your caffeine and cancer/dementia prevention.   See Patient Instructions. Continue with your excellent exercise program.  Mother recently diagnosed with bladder cancer, had some gallbladder dysplasia.      Recheck with me again in 1 year or sooner, if needed.                Thank you for choosing [facility]  for your Health Care. It was a pleasure seeing you at your visit today. Please contact us with any questions or concerns you may have.                   Patti Purcell MD                                  To reach your [facility] care team after hours call:   [phone]    Our clinic hours are:     Monday- 7:30 am - 7:00 pm                             Tuesday through Friday- 7:30 am - 5:00 pm                                        Saturday- 8:00 am - 12:00 pm                  Phone:  [phone]    Our pharmacy hours are:     Monday  8:00 am to 7:00 pm      Tuesday through Friday 8:00am to 6:00pm                        Saturday - 9:00 am to 1:00 pm      Sunday : Closed.              Phone:  [phone]      There is also information available at our web site:  [email or web address]    If your provider ordered any lab tests and you do not receive the results within 10 business days, please call the clinic.    If you need a medication refill please contact your pharmacy.  Please allow 2 business  days for your refill to be completed.    Our clinic offers telephone visits and e visits.  Please ask one of your team members to explain more.      Use [email or web address] Digitalhart (secure email communication and access to your chart) to send your primary care provider a message or make an appointment. Ask someone on your Team how to sign up for [id number] Digitalhart.                       Follow-ups after your visit        Additional Services     GASTROENTEROLOGY ADULT REF PROCEDURE ONLY Liv Zuniga [phone]; [facility]       Last Lab Result: Creatinine (mg/dL)       Date                     Value                 01/26/2017               0.97             ----------  Body mass index is 22.07 kg/(m^2).     Needed:  No  Language:  English    Patient will be contacted to schedule procedure.     Please be aware that coverage of these services is subject to the terms and limitations of your health insurance plan.  Call member services at your health plan with any benefit or coverage questions.  Any procedures must be performed at a [facility] facility OR coordinated by your clinic's referral office.    Please bring the following with you to your appointment:    (1) Any X-Rays, CTs or MRIs which have been performed.  Contact the facility where they were done to arrange for  prior to your scheduled appointment.    (2) List of current medications   (3) This referral request   (4) Any documents/labs given to you for this referral                  Follow-up notes from your care team     Return in about 1 year (around 3/1/2019) for BP Recheck.      Future tests that were ordered for you today     Open Future Orders        Priority Expected Expires Ordered    Fecal colorectal cancer screen FIT Routine 3/22/2018 5/24/2018 3/1/2018            Who to contact     If you have questions or need follow up information about today's clinic visit or your schedule please contact [facility] directly at  "[phone].  Normal or non-critical lab and imaging results will be communicated to you by OKCoinhart, letter or phone within 4 business days after the clinic has received the results. If you do not hear from us within 7 days, please contact the clinic through OKCoinhart or phone. If you have a critical or abnormal lab result, we will notify you by phone as soon as possible.  Submit refill requests through Skeeble or call your pharmacy and they will forward the refill request to us. Please allow 3 business days for your refill to be completed.          Additional Information About Your Visit        OKCoinhart Information     Skeeble gives you secure access to your electronic health record. If you see a primary care provider, you can also send messages to your care team and make appointments. If you have questions, please call your primary care clinic.  If you do not have a primary care provider, please call [phone] and they will assist you.        Care EveryWhere ID     This is your Care EveryWhere ID. This could be used by other organizations to access your [facility] [place] medical records  [id number]        Your Vitals Were     Pulse Temperature Height Last Period Pulse Oximetry BMI (Body Mass Index)    75 98.2  F (36.8  C) (Oral) 5' 4.5\" (1.638 m) 02/20/2018 (Exact Date) 100% 22.07 kg/m2       Blood Pressure from Last 3 Encounters:   03/01/18 120/76   12/21/17 118/68   05/04/17 126/82    Weight from Last 3 Encounters:   03/01/18 130 lb 9.6 oz (59.2 kg)   12/21/17 129 lb (58.5 kg)   05/04/17 129 lb (58.5 kg)              We Performed the Following     Albumin Random Urine Quantitative with Creat Ratio     Albumin Random Urine Quantitative with Creat Ratio     BASIC METABOLIC PANEL     CBC with platelets     GASTROENTEROLOGY ADULT REF PROCEDURE ONLY Liv Zuniga [phone]; [facility]     Lipid panel reflex to direct LDL Fasting     TSH with free T4 reflex          Today's Medication Changes        "   These changes are accurate as of 3/1/18  2:43 PM.  If you have any questions, ask your nurse or doctor.               These medicines have changed or have updated prescriptions.        Dose/Directions    topiramate 25 MG tablet   Commonly known as:  TOPAMAX   This may have changed:    - how much to take  - when to take this   Used for:  Episodic tension-type headache, not intractable        Dose:  25 mg   Take 1 tablet (25 mg) by mouth 2 times daily   Quantity:  180 tablet   Refills:  3         Stop taking these medicines if you haven't already. Please contact your care team if you have questions.     etonogestrel-ethinyl estradiol 0.12-0.015 MG/24HR vaginal ring   Commonly known as:  NUVARING   Stopped by:  Patti Purcell MD                    Primary Care Provider Office Phone # Fax #    Patti Purcell [phone]       [facility] [address]        Equal Access to Services     [facility]: Hadii neeta odom hadasho Sobrettali, waaxda luqadaha, qaybta kaalmada adeegyada, cathy donovan . So [place] [phone].    ATENCIÓN: Si habla español, tiene a hernandez disposición servicios gratuitos de asistencia lingüística. [facility] [phone].    We comply with applicable federal civil rights laws and [place] laws. We do not discriminate on the basis of race, color, national origin, age, disability, sex, sexual orientation, or gender identity.            Thank you!     Thank you for choosing [facility]  for your care. Our goal is always to provide you with excellent care. Hearing back from our patients is one way we can continue to improve our services. Please take a few minutes to complete the written survey that you may receive in the mail after your visit with us. Thank you!             Your Updated Medication List - Protect others around you: Learn how to safely use, store and throw away your medicines at www.disposemymeds.org.           This list is accurate as of 3/1/18  2:43 PM.  Always use your most recent med list.                   Brand Name Dispense Instructions for use Diagnosis    candesartan 8 MG Tabs    ATACAND    90 tablet    TAKE ONE TABLET BY MOUTH EVERY DAY    Essential hypertension with goal blood pressure less than 140/90       CoQ10 50 MG Caps      Take 70 mg by mouth daily        glucosamine 500 MG Caps      1 tab BID    Other general counseling and advice for contraceptive management, Routine gynecological examination       GRAPESEED EXTRACT PO      Take by mouth daily        Multi-vitamin Tabs tablet   Generic drug:  multivitamin, therapeutic with minerals     30    1 TABLET DAILY    Other general counseling and advice for contraceptive management, Routine gynecological examination       norethindrone-ethinyl estradiol 1-20 MG-MCG per tablet    MICROGESTIN 1/20    63 tablet    Take 1 tablet by mouth daily    Encounter for initial prescription of contraceptive pills       OMEGA-3 CAPS   OR      2 caps Twice daily,    Other general counseling and advice for contraceptive management, Routine gynecological examination       PROBIOTICA Chew      take one tablet daily    Other general counseling and advice for contraceptive management, Routine gynecological examination       topiramate 25 MG tablet    TOPAMAX    180 tablet    Take 1 tablet (25 mg) by mouth 2 times daily    Episodic tension-type headache, not intractable       vitamin D 2000 UNITS Caps      Take by mouth daily

## 2018-03-01 NOTE — NURSING NOTE
"Chief Complaint   Patient presents with     Recheck Medication       Initial /76 (BP Location: Left arm, Patient Position: Chair, Cuff Size: Adult Regular)  Pulse 75  Temp 98.2  F (36.8  C) (Oral)  Ht 5' 4.5\" (1.638 m)  Wt 130 lb 9.6 oz (59.2 kg)  LMP 02/20/2018 (Exact Date)  SpO2 100%  BMI 22.07 kg/m2 Estimated body mass index is 22.07 kg/(m^2) as calculated from the following:    Height as of this encounter: 5' 4.5\" (1.638 m).    Weight as of this encounter: 130 lb 9.6 oz (59.2 kg).  Medication Reconciliation: complete   Nida Thrasher CMA  "

## 2018-03-02 LAB
CREAT UR-MCNC: 156 MG/DL
MICROALBUMIN UR-MCNC: 12 MG/L
MICROALBUMIN/CREAT UR: 7.5 MG/G CR (ref 0–25)

## 2018-03-09 RX ORDER — ETONOGESTREL/ETHINYL ESTRADIOL .12-.015MG
RING, VAGINAL VAGINAL
Qty: 3 EACH | Refills: 1 | Status: SHIPPED | OUTPATIENT
Start: 2018-03-09 | End: 2018-07-13

## 2018-03-09 NOTE — TELEPHONE ENCOUNTER
"Requested Prescriptions   Pending Prescriptions Disp Refills     NUVARING 0.12-0.015 MG/24HR vaginal ring [Pharmacy Med Name: NUVARING 0.12-0.015MG/24HR RING] 3 each 1     Sig: PLACE 1 RING VAGINALLY EVERY 21 DAYS AS DIRECTED --REMOVE FOR 1 WEEK    Contraceptives Protocol Passed    3/8/2018 11:41 AM       Passed - Patient is not a current smoker if age is 35 or older       Passed - Recent (12 mo) or future (30 days) visit within the authorizing provider's specialty    Patient had office visit in the last year or has a visit in the next 30 days with authorizing provider.  See \"Patient Info\" tab in inbasket, or \"Choose Columns\" in Meds & Orders section of the refill encounter.            Passed - No active pregnancy on record       Passed - No positive pregnancy test in past 12 months        Rx approved.        Erin Larry RN    "

## 2018-04-25 ENCOUNTER — TELEPHONE (OUTPATIENT)
Dept: FAMILY MEDICINE | Facility: CLINIC | Age: 48
End: 2018-04-25

## 2018-04-25 DIAGNOSIS — R82.90 NONSPECIFIC FINDING ON EXAMINATION OF URINE: Primary | ICD-10-CM

## 2018-04-25 DIAGNOSIS — R30.0 DYSURIA: ICD-10-CM

## 2018-04-25 DIAGNOSIS — R30.0 DYSURIA: Primary | ICD-10-CM

## 2018-04-25 LAB
ALBUMIN UR-MCNC: NEGATIVE MG/DL
APPEARANCE UR: ABNORMAL
BACTERIA #/AREA URNS HPF: ABNORMAL /HPF
BILIRUB UR QL STRIP: NEGATIVE
COLOR UR AUTO: YELLOW
GLUCOSE UR STRIP-MCNC: NEGATIVE MG/DL
HGB UR QL STRIP: ABNORMAL
KETONES UR STRIP-MCNC: ABNORMAL MG/DL
LEUKOCYTE ESTERASE UR QL STRIP: ABNORMAL
NITRATE UR QL: NEGATIVE
NON-SQ EPI CELLS #/AREA URNS LPF: ABNORMAL /LPF
PH UR STRIP: 6 PH (ref 5–7)
RBC #/AREA URNS AUTO: ABNORMAL /HPF
SOURCE: ABNORMAL
SP GR UR STRIP: >1.03 (ref 1–1.03)
TRANS CELLS #/AREA URNS HPF: ABNORMAL /HPF
UROBILINOGEN UR STRIP-ACNC: 0.2 EU/DL (ref 0.2–1)
WBC #/AREA URNS AUTO: ABNORMAL /HPF

## 2018-04-25 PROCEDURE — 81001 URINALYSIS AUTO W/SCOPE: CPT | Performed by: NURSE PRACTITIONER

## 2018-04-25 PROCEDURE — 87086 URINE CULTURE/COLONY COUNT: CPT | Performed by: NURSE PRACTITIONER

## 2018-04-25 PROCEDURE — 87186 SC STD MICRODIL/AGAR DIL: CPT | Performed by: NURSE PRACTITIONER

## 2018-04-25 PROCEDURE — 87088 URINE BACTERIA CULTURE: CPT | Performed by: NURSE PRACTITIONER

## 2018-04-25 RX ORDER — NITROFURANTOIN 25; 75 MG/1; MG/1
100 CAPSULE ORAL 2 TIMES DAILY
Qty: 14 CAPSULE | Refills: 0 | Status: SHIPPED | OUTPATIENT
Start: 2018-04-25 | End: 2018-07-13

## 2018-04-25 RX ORDER — PHENAZOPYRIDINE HYDROCHLORIDE 200 MG/1
200 TABLET, FILM COATED ORAL 3 TIMES DAILY PRN
Qty: 10 TABLET | Refills: 1 | Status: SHIPPED | OUTPATIENT
Start: 2018-04-25 | End: 2018-07-13

## 2018-04-26 NOTE — PROGRESS NOTES
Guerrero Jama,  Here are your urine results, the culture is in process.  Sincerely,     Susan Haase, CNP

## 2018-04-27 LAB
BACTERIA SPEC CULT: ABNORMAL
SPECIMEN SOURCE: ABNORMAL

## 2018-04-27 NOTE — PROGRESS NOTES
Guerrero Jama,  Your urine culture indicates that the macrobid is sensitive.  Let me know if you need anything.  Sincerely,     Susan Haase,CNP

## 2018-07-13 ENCOUNTER — OFFICE VISIT (OUTPATIENT)
Dept: FAMILY MEDICINE | Facility: CLINIC | Age: 48
End: 2018-07-13
Payer: COMMERCIAL

## 2018-07-13 VITALS
RESPIRATION RATE: 16 BRPM | HEART RATE: 63 BPM | DIASTOLIC BLOOD PRESSURE: 83 MMHG | TEMPERATURE: 98.1 F | BODY MASS INDEX: 22.02 KG/M2 | HEIGHT: 64 IN | WEIGHT: 129 LBS | OXYGEN SATURATION: 99 % | SYSTOLIC BLOOD PRESSURE: 119 MMHG

## 2018-07-13 DIAGNOSIS — Z71.84 COUNSELING FOR TRAVEL: Primary | ICD-10-CM

## 2018-07-13 PROCEDURE — 90471 IMMUNIZATION ADMIN: CPT | Performed by: FAMILY MEDICINE

## 2018-07-13 PROCEDURE — 90738 INACTIVATED JE VACC IM: CPT | Performed by: FAMILY MEDICINE

## 2018-07-13 PROCEDURE — 90675 RABIES VACCINE IM: CPT | Performed by: FAMILY MEDICINE

## 2018-07-13 PROCEDURE — 99401 PREV MED CNSL INDIV APPRX 15: CPT | Mod: 25 | Performed by: FAMILY MEDICINE

## 2018-07-13 PROCEDURE — 90472 IMMUNIZATION ADMIN EACH ADD: CPT | Performed by: FAMILY MEDICINE

## 2018-07-13 RX ORDER — AZITHROMYCIN 500 MG/1
500 TABLET, FILM COATED ORAL DAILY
Qty: 6 TABLET | Refills: 0 | Status: SHIPPED | OUTPATIENT
Start: 2018-07-13 | End: 2018-07-16

## 2018-07-13 RX ORDER — ATOVAQUONE AND PROGUANIL HYDROCHLORIDE 250; 100 MG/1; MG/1
1 TABLET, FILM COATED ORAL DAILY
Qty: 30 TABLET | Refills: 0 | Status: SHIPPED | OUTPATIENT
Start: 2018-07-13 | End: 2019-04-25

## 2018-07-13 NOTE — PROGRESS NOTES
Itinerary: (List all countries)  [place], [place], [place], [place], [place]  Departure Date: 8/4/18, Return Date: 8/19/18  Reason for Travel (i.e. business, pleasure): pleasure  Visiting an urban or rural area? both  Accommodations (i.e. hotel, hostel, friends, family etc.): hotel, boat  Women - First day of your last period: n/a    IMMUNIZATION HISTORY  Have you received any vaccinations in the past 4 weeks?  No   Have you ever fainted from having your blood drawn or from an injection?  Yes  Have you ever had a fever reaction to a vaccination?  No  Have you had any bad reaction / side effect from any vaccination?  No  Have you ever had hepatitis A or B vaccine?  Yes  Do you live (or work closely with anyone who has AIDS, or any other immune disorder, or who is on chemotherapy for cancer or family history of immunodeficiency?  No  Have you received any injection of immune globulin or any blood products during the past 12 months?  No    GENERAL MEDICAL HISTORY  Do you have a medical condition that warrants maintenance meds or physician follow-up?  Yes  Do you have a medical condition that is stable now, but that may recur while traveling?  No  Has your spleen been removed?   No  Have you had an acute illness or a fever in the past 48 hours?  No  Are you pregnant or might you become pregnant on this trip? Any chance of pregnancy?  No  Are you breastfeeding?  No  Do you have HIV, AIDS, an AIDS-like condition, any other immune disorder, leukemia or cancer?  No  Do you have a severe combined immunodeficiency disease?  No  Have you had your thymus gland removed or a history of problems with your thymus, such as myasthenia gravis, DiGeorge syndrome, or thymoma?  No  Do you have severe thrombocytopenia (low platelet count) or blood clotting disorder?  No  Have you ever had a convulsion, seizure, epilepsy, neurologic condition or brain infection?  No  Do you have any stomach conditions?  No  Do you have a G6PD deficiency?   No  Do you have severe renal or kidney impairment?  No  Do you have a history of psychiatric problems?  No  Do you have a problem with strange dreams and/or nightmares?  No  Do you have insomnia?  No  Do you have problems with vaginitis?  No  Do you have psoriasis?  No  Are you prone to motion sickness?  Yes  Have you ever had headaches, nausea, vomiting or breathing problems from altitude exposure?  No    MEDICATIONS  ARE YOU TAKING:  Steroids, prednisone, or anti-cancer drugs?  No  Antibiotics or sulfonamides?  No  Oral contraceptives?  Yes  Aspirin therapy? (children & adolescents)  No    ALLERGIES  ARE YOU ALLERGIC TO:  Any medications?  No  Any foods or other?  No    Immunizations discussed include: Rabies and Japanese Encephalitis  Malaraia prophylaxis recommended: Malarone  Symptomatic treatment for traveler's diarrhea: zithromax.    ASSESSMENT/PLAN:  (Z71.89) Counseling for travel  (primary encounter diagnosis)  Comment:   Plan: azithromycin (ZITHROMAX) 500 MG tablet,         atovaquone-proguanil (MALARONE) 250-100 MG per         tablet, RABIES VACCINE, IM, Yoruba         ENCEPHALITIS IM 18 YO +          Also will give doxycyline 200 mg once weekly for protection from leptospirosis as pt is very highly adventurous.    I have reviewed general recommendations for safe travel including: food/water precautions, insect avoidance, safe sex practices given high prevalence of HIV and other STDs, roadway safety. Educational materials and links to the CDC Traveler's health website have been provided.    Total time 15 minutes, greater than 50 percent in counseling and coordination of care.

## 2018-07-13 NOTE — MR AVS SNAPSHOT
After Visit Summary   7/13/2018    DR Evita PEDERSON Sotero    MRN: [id number]           Patient Information     Date Of Birth          1970        Visit Information        Provider Department      7/13/2018 2:15 PM Shelly Stahl MD [facility]        Today's Diagnoses     Counseling for travel    -  1       Follow-ups after your visit        Your next 10 appointments already scheduled     Jul 20, 2018 11:30 AM CDT   Nurse Only with CR MA/LPN   [facility] ([facility])    [address] [facility] [address] [phone]            Aug 03, 2018 11:30 AM CDT   Nurse Only with CR MA/LPN   [facility] ([facility])    [address] [facility] [address] [phone]              Who to contact     If you have questions or need follow up information about today's clinic visit or your schedule please contact [facility] directly at [phone].  Normal or non-critical lab and imaging results will be communicated to you by Hatchhart, letter or phone within 4 business days after the clinic has received the results. If you do not hear from us within 7 days, please contact the clinic through SellABandt or phone. If you have a critical or abnormal lab result, we will notify you by phone as soon as possible.  Submit refill requests through Instructure or call your pharmacy and they will forward the refill request to us. Please allow 3 business days for your refill to be completed.          Additional Information About Your Visit        Hatchhart Information     Instructure gives you secure access to your electronic health record. If you see a primary care provider, you can also send messages to your care team and make appointments. If you have questions, please call your primary care clinic.  If you do not have a primary care provider, please call [phone] and they will assist you.    "     Care EveryWhere ID     This is your Care EveryWhere ID. This could be used by other organizations to access your [place] medical records  [id number]        Your Vitals Were     Pulse Temperature Respirations Height Pulse Oximetry BMI (Body Mass Index)    63 98.1  F (36.7  C) (Oral) 16 5' 4\" (1.626 m) 99% 22.14 kg/m2       Blood Pressure from Last 3 Encounters:   07/13/18 119/83   03/01/18 120/76   12/21/17 118/68    Weight from Last 3 Encounters:   07/13/18 129 lb (58.5 kg)   03/01/18 130 lb 9.6 oz (59.2 kg)   12/21/17 129 lb (58.5 kg)              We Performed the Following     Kyrgyz ENCEPHALITIS IM 16 YO +     RABIES VACCINE, IM          Today's Medication Changes          These changes are accurate as of 7/13/18  4:07 PM.  If you have any questions, ask your nurse or doctor.               Start taking these medicines.        Dose/Directions    atovaquone-proguanil 250-100 MG per tablet   Commonly known as:  MALARONE   Used for:  Counseling for travel   Started by:  Shelly Stahl MD        Dose:  1 tablet   Take 1 tablet by mouth daily Start 2 days before travel and continue 7 days after return.   Quantity:  30 tablet   Refills:  0       azithromycin 500 MG tablet   Commonly known as:  ZITHROMAX   Used for:  Counseling for travel   Started by:  Shelly Stahl MD        Dose:  500 mg   Take 1 tablet (500 mg) by mouth daily for 3 doses   Quantity:  6 tablet   Refills:  0            Where to get your medicines      These medications were sent [facility] [address]  [address] [facility] [address]     Phone:  [phone]     atovaquone-proguanil 250-100 MG per tablet    azithromycin 500 MG tablet                Primary Care Provider Office Phone # Fax #    Patti Purcell [phone]       [address] [place] [address]        Equal Access to Services     RONNIE ENGLE AH: larry Valera, " torrie wills nahidcathy de los santos ah. So [facility] [phone].    ATENCIÓN: Si sophie kerns, tiene a hernandez disposición servicios gratuitos de asistencia lingüística. Espinoza al [phone].    We comply with applicable federal civil rights laws and [place] laws. We do not discriminate on the basis of race, color, national origin, age, disability, sex, sexual orientation, or gender identity.            Thank you!     Thank you for choosing [facility]  for your care. Our goal is always to provide you with excellent care. Hearing back from our patients is one way we can continue to improve our services. Please take a few minutes to complete the written survey that you may receive in the mail after your visit with us. Thank you!             Your Updated Medication List - Protect others around you: Learn how to safely use, store and throw away your medicines at www.disposemymeds.org.          This list is accurate as of 7/13/18  4:07 PM.  Always use your most recent med list.                   Brand Name Dispense Instructions for use Diagnosis    atovaquone-proguanil 250-100 MG per tablet    MALARONE    30 tablet    Take 1 tablet by mouth daily Start 2 days before travel and continue 7 days after return.    Counseling for travel       azithromycin 500 MG tablet    ZITHROMAX    6 tablet    Take 1 tablet (500 mg) by mouth daily for 3 doses    Counseling for travel       candesartan 8 MG Tabs    ATACAND    90 tablet    Take 1 tablet (8 mg) by mouth daily    Essential hypertension with goal blood pressure less than 140/90       CoQ10 50 MG Caps      Take 70 mg by mouth daily        glucosamine 500 MG Caps      1 tab BID    Other general counseling and advice for contraceptive management, Routine gynecological examination       GRAPESEED EXTRACT PO      Take by mouth daily        Multi-vitamin Tabs tablet   Generic drug:  multivitamin, therapeutic with minerals     30    1 TABLET  DAILY    Other general counseling and advice for contraceptive management, Routine gynecological examination       norethindrone-ethinyl estradiol 1-20 MG-MCG per tablet    MICROGESTIN 1/20    63 tablet    Take 1 tablet by mouth daily    Encounter for initial prescription of contraceptive pills       OMEGA-3 CAPS   OR      2 caps Twice daily,    Other general counseling and advice for contraceptive management, Routine gynecological examination       PROBIOTICA Chew      take one tablet daily    Other general counseling and advice for contraceptive management, Routine gynecological examination       topiramate 50 MG tablet    TOPAMAX    90 tablet    Take 1 tablet (50 mg) by mouth daily    Episodic tension-type headache, not intractable       vitamin D 2000 units Caps      Take by mouth daily

## 2018-07-14 RX ORDER — DOXYCYCLINE 100 MG/1
200 CAPSULE ORAL WEEKLY
Qty: 6 CAPSULE | Refills: 0 | Status: SHIPPED | OUTPATIENT
Start: 2018-07-14 | End: 2019-04-25

## 2018-07-20 ENCOUNTER — ALLIED HEALTH/NURSE VISIT (OUTPATIENT)
Dept: NURSING | Facility: CLINIC | Age: 48
End: 2018-07-20
Payer: COMMERCIAL

## 2018-07-20 DIAGNOSIS — Z23 NEED FOR IMMUNIZATION AGAINST RABIES: Primary | ICD-10-CM

## 2018-07-20 DIAGNOSIS — Z23 NEED FOR PROPHYLACTIC VACCINATION WITH JAPANESE ENCEPHALITIS VACCINE: ICD-10-CM

## 2018-07-20 PROCEDURE — 90471 IMMUNIZATION ADMIN: CPT

## 2018-07-20 PROCEDURE — 90675 RABIES VACCINE IM: CPT

## 2018-07-20 PROCEDURE — 90738 INACTIVATED JE VACC IM: CPT

## 2018-07-20 PROCEDURE — 90472 IMMUNIZATION ADMIN EACH ADD: CPT

## 2018-07-20 PROCEDURE — 99207 ZZC NO CHARGE NURSE ONLY: CPT

## 2018-08-03 ENCOUNTER — ALLIED HEALTH/NURSE VISIT (OUTPATIENT)
Dept: NURSING | Facility: CLINIC | Age: 48
End: 2018-08-03
Payer: COMMERCIAL

## 2018-08-03 DIAGNOSIS — Z23 NEED FOR IMMUNIZATION AGAINST RABIES: Primary | ICD-10-CM

## 2018-08-03 PROCEDURE — 90675 RABIES VACCINE IM: CPT

## 2018-08-03 PROCEDURE — 90471 IMMUNIZATION ADMIN: CPT

## 2018-08-03 PROCEDURE — 99207 ZZC NO CHARGE NURSE ONLY: CPT

## 2018-08-15 ENCOUNTER — TELEPHONE (OUTPATIENT)
Dept: FAMILY MEDICINE | Facility: CLINIC | Age: 48
End: 2018-08-15

## 2018-08-15 NOTE — TELEPHONE ENCOUNTER
Patient called to the clinic from overseas as she was seen for her travel about 2 to 3 weeks ago.  Pt needs a letter of clearance for scuba diving due to her Mandeep blood pressure, which is very well controlled. Letter started and emailed to the patient.  Shelly Stahl MD  [facility]  [phone]

## 2018-10-12 ENCOUNTER — HOSPITAL ENCOUNTER (OUTPATIENT)
Facility: CLINIC | Age: 48
Discharge: HOME | End: 2018-10-12
Attending: INTERNAL MEDICINE | Admitting: INTERNAL MEDICINE
Payer: COMMERCIAL

## 2018-10-12 VITALS
BODY MASS INDEX: 22.02 KG/M2 | WEIGHT: 129 LBS | SYSTOLIC BLOOD PRESSURE: 110 MMHG | RESPIRATION RATE: 16 BRPM | OXYGEN SATURATION: 100 % | DIASTOLIC BLOOD PRESSURE: 89 MMHG | HEIGHT: 64 IN

## 2018-10-12 LAB — COLONOSCOPY: NORMAL

## 2018-10-12 PROCEDURE — G0500 MOD SEDAT ENDO SERVICE >5YRS: HCPCS | Performed by: INTERNAL MEDICINE

## 2018-10-12 PROCEDURE — 25000128 H RX IP 250 OP 636: Performed by: INTERNAL MEDICINE

## 2018-10-12 PROCEDURE — 45378 DIAGNOSTIC COLONOSCOPY: CPT | Performed by: INTERNAL MEDICINE

## 2018-10-12 PROCEDURE — G0121 COLON CA SCRN NOT HI RSK IND: HCPCS | Performed by: INTERNAL MEDICINE

## 2018-10-12 RX ORDER — ONDANSETRON 2 MG/ML
4 INJECTION INTRAMUSCULAR; INTRAVENOUS
Status: DISCONTINUED | OUTPATIENT
Start: 2018-10-12 | End: 2018-10-12 | Stop reason: HOSPADM

## 2018-10-12 RX ORDER — FENTANYL CITRATE 50 UG/ML
INJECTION, SOLUTION INTRAMUSCULAR; INTRAVENOUS PRN
Status: DISCONTINUED | OUTPATIENT
Start: 2018-10-12 | End: 2018-10-12 | Stop reason: HOSPADM

## 2018-10-12 RX ORDER — LIDOCAINE 40 MG/G
CREAM TOPICAL
Status: DISCONTINUED | OUTPATIENT
Start: 2018-10-12 | End: 2018-10-12 | Stop reason: HOSPADM

## 2018-10-12 RX ORDER — FLUMAZENIL 0.1 MG/ML
0.2 INJECTION, SOLUTION INTRAVENOUS
Status: DISCONTINUED | OUTPATIENT
Start: 2018-10-12 | End: 2018-10-12 | Stop reason: HOSPADM

## 2018-10-12 RX ORDER — ONDANSETRON 4 MG/1
4 TABLET, ORALLY DISINTEGRATING ORAL EVERY 6 HOURS PRN
Status: DISCONTINUED | OUTPATIENT
Start: 2018-10-12 | End: 2018-10-12 | Stop reason: HOSPADM

## 2018-10-12 RX ORDER — ONDANSETRON 2 MG/ML
4 INJECTION INTRAMUSCULAR; INTRAVENOUS EVERY 6 HOURS PRN
Status: DISCONTINUED | OUTPATIENT
Start: 2018-10-12 | End: 2018-10-12 | Stop reason: HOSPADM

## 2018-10-12 RX ORDER — NALOXONE HYDROCHLORIDE 0.4 MG/ML
.1-.4 INJECTION, SOLUTION INTRAMUSCULAR; INTRAVENOUS; SUBCUTANEOUS
Status: DISCONTINUED | OUTPATIENT
Start: 2018-10-12 | End: 2018-10-12 | Stop reason: HOSPADM

## 2018-10-12 NOTE — LETTER
September 13, 2018      Evita Bey  [address]  [facility] [address]         Thank you for choosing [facility]. You are scheduled for the following service.     Date:  Friday October 12, 2018             Procedure:  COLONOSCOPY  Doctor:        Dr Castro   Arrival Time:  0830  *Check in at Emergency/Endoscopy desk*  Procedure Time:  0900    Location:   [facility]        Endoscopy Department, First Floor (Enter through ER Doors) *        [address] [place], [place] [address] [phone] or [phone] () to reschedule      MIRALAX -GATORADE  PREP  Colonoscopy is the most accurate test to detect colon polyps and colon cancer; and the only test where polyps can be removed. During this procedure, a doctor examines the lining of your large intestine and rectum through a flexible tube.           Transportation  Arrange for a ride for the day of your procedure with a responsible adult.  A taxi ride is not an option unless you are accompanied by a responsible adult. If you fail to arrange transportation with a responsible adult, your procedure will be cancelled and rescheduled.    Purchase the  following supplies at your local pharmacy:  - 2 (two) bisacodyl tablets: each tablet contains 5 mg.  (Dulcolax  laxative NOT Dulcolax  stool softener)   - 1 (one) 8.3 oz bottle of Polyethylene Glycol (PEG) 3350 Powder   (MiraLAX , Smooth LAX , ClearLAX  or equivalent)  - 64 oz Gatorade    Regular Gatorade, Gatorade G2 , Powerade , Powerade Zero  or Pedialyte  is acceptable. Red colored flavors are not allowed; all other colors (yellow, green, orange, purple and blue) are okay. It is also okay to buy two 2.12 oz packets of powdered Gatorade that can be mixed with water to a total volume of 64 oz of liquid.  - 1 (one) 10 oz bottle of Magnesium Citrate (Red colored flavors are not allowed)  It is also okay for you to use a 0.5 oz package of powdered  magnesium citrate (17 g) mixed with 10 oz of water.    PREPARATION FOR COLONOSCOPY    7 days before:    Discontinue fiber supplements and medications containing iron. This includes Metamucil  and Fibercon ; and multivitamins with iron.  3 days before:    Begin a low-fiber diet. A low-fiber diet helps making the cleanout more effective.     Examples of a low-fiber diet include (but are not limited to): white bread, white rice, pasta, crackers, fish, chicken, eggs, ground beef, creamy peanut butter, cooked/steamed/boiled vegetables, canned fruit, bananas, melons, milk, plain yogurt cheese, salad dressing and other condiments.     The following are not allowed on a low-fiber diet: seeds, nuts, popcorn, bran, whole wheat, corn, quinoa, raw fruits and vegetables, berries and dried fruit, beans and lentils.    For additional details on low-fiber diet, please refer to the table on the last page.  2 days before:    Continue the low-fiber diet.     Drink at least 8 glasses of water throughout the day.     Stop eating solid foods at 11:45 pm.  1 day before:    In the morning: begin a clear liquid diet (liquids you can see through).     Examples of a clear liquid diet include: water, clear broth or bouillon, Gatorade, Pedialyte or Powerade, carbonated and non-carbonated soft drinks (Sprite , 7-Up , ginger ale), strained fruit juices without pulp (apple, white grape, white cranberry), Jell-O  and popsicles.     The following are not allowed on a clear liquid diet: red liquids, alcoholic beverages, dairy products (milk, creamer, and yogurt), protein shakes, creamy broths, juice with pulp and chewing tobacco.    At noon: take 2 (two) bisacodyl tablets     At 4 (and no later than 6pm): start drinking the Miralax-Gatorade preparation (8.3 oz of Miralax mixed with 64 oz of Gatorade in a large pitcher). Drink 1(one) 8 oz glass every 15 minutes thereafter, until the mixture is gone.    COLON CLEANSING TIPS: drink adequate amounts of  fluids before and after your colon cleansing to prevent dehydration. Stay near a toilet because you will have diarrhea. Even if you are sitting on the toilet, continue to drink the cleansing solution every 15 minutes. If you feel nauseous or vomit, rinse your mouth with water, take a 15 to 30-minute-break and then continue drinking the solution. You will be uncomfortable until the stool has flushed from your colon (in about 2 to 4 hours). You may feel chilled.              Day of your procedure  You may take all of your morning medications including blood pressure medications, blood thinners (if you have not been instructed to stop these by our office), methadone, anti-seizure medications with sips of water 3 hours prior to your procedure or earlier. Do not take insulin or vitamins prior to your procedure. Continue the clear liquid diet.   4 hours prior: drink 10 oz of magnesium citrate. It may be easier to drink it with a straw.    STOP consuming all liquids after that.     Do not take anything by mouth during this time.     Allow extra time to travel to your procedure as you may need to stop and use a restroom along the way.  You are ready for the procedure, if you followed all instructions and your stool is no longer formed, but clear or yellow liquid. If you are unsure whether your colon is clean, please call our office at [phone] before you leave for your appointment.  Bring the following to your procedure:  - Insurance Card/Photo ID.   - List of current medications including over-the-counter medications and supplements.   - Your rescue inhaler if you currently use one to control asthma.      Canceling or rescheduling your appointment:   If you must cancel or reschedule your appointment, please call [phone] as soon as possible.      COLONOSCOPY PRE-PROCEDURE CHECKLIST  If you have diabetes, ask your regular doctor for diet and medication restrictions.  If you take an anticoagulant or anti-platelet  medication (such as Coumadin , Lovenox , Pradaxa , Xarelto , Eliquis , etc.), please call your primary doctor for advice on holding this medication.  If you take aspirin you may continue to do so.  If you are or may be pregnant, please discuss the risks and benefits of this procedure with your doctor.          What happens during a colonoscopy?    Plan to spend up to two hours, starting at registration time, at the endoscopy center the day of your procedure. The colonoscopy takes an average of 15 to 30 minutes. Recovery time is about 30 minutes.    Before the exam:    You will change into a gown.    Your medical history and medication list will be reviewed with you, unless that has been done over the phone prior to the procedure.     A nurse will insert an intravenous (IV) line into your hand or arm.    The doctor will meet with you and will give you a consent form to sign.    During the exam:     Medicine will be given through the IV line to help you relax.     Your heart rate and oxygen levels will be monitored. If your blood pressure is low, you may be given fluids through the IV line.     The doctor will insert a flexible hollow tube, called a colonoscope, into your rectum. The scope will be advanced slowly through the large intestine (colon).    You may have a feeling of fullness or pressure.     If an abnormal tissue or a polyp is found, the doctor may remove it through the endoscope for closer examination, or biopsy. Tissue removal is painless    After the exam:           Any tissue samples removed during the exam will be sent to a lab for evaluation. It may take 5-7 working days for you to be notified of the results.     A nurse will provide you with complete discharge instructions before you leave the endoscopy center. Be sure to ask the nurse for specific instructions if you take blood thinners such as Aspirin, Coumadin or Plavix.     The doctor will prepare a full report for you and for the physician who  referred you for the procedure.     Your doctor will talk with you about the initial results of your exam.      Medication given during the exam will prohibit you from driving for the rest of the day.     Following the exam, you may resume your normal diet. Your first meal should be light, no greasy foods. Avoid alcohol until the next day.     You may resume your regular activities the day after the procedure.     LOW-FIBER DIET    Foods RECOMMENDED Foods to AVOID   Breads, Cereal, Rice and Pasta:   White bread, rolls, biscuits, croissant and olga toast.   Waffles, Zambian toast and pancakes.   White rice, noodles, pasta, macaroni and peeled cooked potatoes.   Plain crackers and saltines.   Cooked cereals: farina, cream of rice.   Cold cereals: Puffed Rice , Rice Krispies , Corn Flakes  and Special K    Breads, Cereal, Rice and Pasta:   Breads or rolls with nuts, seeds or fruit.   Whole wheat, pumpernickel, rye breads and cornbread.   Potatoes with skin, brown or wild rice, and kasha (buckwheat).     Vegetables:   Tender cooked and canned vegetables without seeds: carrots, asparagus tips, green or wax beans, pumpkin, spinach, lima beans. Vegetables:   Raw or steamed vegetables.   Vegetables with seeds.   Sauerkraut.   Winter squash, peas, broccoli, Brussel sprouts, cabbage, onions, cauliflower, baked beans, peas and corn.   Fruits:   Strained fruit juice.   Canned fruit, except pineapple.   Ripe bananas and melon. Fruits:   Prunes and prune juice.   Raw fruits.   Dried fruits: figs, dates and raisins.   Milk/Dairy:   Milk: plain or flavored.   Yogurt, custard and ice cream.   Cheese and cottage cheese Milk/Dairy:     Meat and other proteins:   ground, well-cooked tender beef, lamb, ham, veal, pork, fish, poultry and organ meats.   Eggs.   Peanut butter without nuts. Meat and other proteins:   Tough, fibrous meats with gristle.   Dry beans, peas and lentils.   Peanut butter with nuts.   Tofu.   Fats, Snack, Sweets,  Condiments and Beverages:   Margarine, butter, oils, mayonnaise, sour cream and salad dressing, plain gravy.   Sugar, hard candy, clear jelly, honey and syrup.   Spices, cooked herbs, bouillon, broth and soups made with allowed vegetable, ketchup and mustard.   Coffee, tea and carbonated drinks.   Plain cakes, cookies and pretzels.   Gelatin, plain puddings, custard, ice cream, sherbet and popsicles. Fats, Snack, Sweets, Condiments and Beverages:   Nuts, seeds and coconut.   Jam, marmalade and preserves.   Pickles, olives, relish and horseradish.   All desserts containing nuts, seeds, dried fruit and coconut; or made from whole grains or bran.   Candy made with nuts or seeds.   Popcorn.                     DIRECTIONS TO THE ENDOSCOPY DEPARTMENT     From the north ([facility])  Take [address] South, exit on [address] [address]. Get into the left hand juliet, turn left (east), go one-half mile to [address] and turn left. Go north to the first stoplight, take a right on [address] and follow it to the Emergency entrance.    From the south ([facility])  Take 35N to the [address]E split and exit on [address] [address]. On [address] [address], turn left (west) to [address]. Turn right (north) on [address]. Go north to the first stoplight, take a right on [address] and follow it to the Emergency entrance.    From the east via 35E ([facility])  Take [address] south to [address] [address] exit. Turn right on [address] [address]. Go west to [address]. Turn right (north) on [address]. Go to the first stoplight, take a right and follow on [address] to the Emergency entrance.    From the east via [address] ([facility])  Take [address] to [address]. Turn left (south) on [address] to [address]. Turn left (east) on [address] and follow it to the Emergency entrance.    From the west via [address] ([place], [place])  Take [address] east to [address].  Turn right (south) on [address] to [email or web address]. Turn left (east) on [address] and follow it to the Emergency entrance.

## 2018-10-12 NOTE — H&P
Pre-Endoscopy History and Physical     Evita Bey MRN# [id number]   YOB: 1970 Age: 48 year old     Date of Procedure: 10/12/2018  Primary care provider: Patti Purcell  Type of Endoscopy: Colonoscopy with possible biopsy, possible polypectomy  Reason for Procedure: screen  Type of Anesthesia Anticipated: Conscious Sedation    HPI:    Evita is a 48 year old female who will be undergoing the above procedure.      A history and physical has been performed. The patient's medications and allergies have been reviewed. The risks and benefits of the procedure and the sedation options and risks were discussed with the patient.  All questions were answered and informed consent was obtained.      She denies a personal or family history of anesthesia complications or bleeding disorders.     Patient Active Problem List   Diagnosis     Family history of ischemic heart disease - father's side of family - with normal lipids     Family history of ovarian cancer - following with Dr. Stuart - ? BSO in late       CARDIOVASCULAR SCREENING; LDL GOAL LESS THAN 160     Essential hypertension with goal blood pressure less than 140/90     Chest pain, unspecified     Irritable bowel syndrome with diarrhea        Past Medical History:   Diagnosis Date     Calculus of kidney 1996    Calcium stone - etiology after w/u was excess calcium intake     Fam hx-cardiovas dis NEC     father with MI at age 58 yo, s/p PTCA     Family history of ovarian cancer - following with Dr. Stuart - ? 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 Father      MI at age 59 and stented -      Hypertension Father      GASTROINTESTINAL DISEASE Father      gallstones and gallstone pancreatitis at age 61     Lipids Father      low HDL     Genitourinary Problems Father      uric acid kidney stone     C.A.D. Paternal Grandfather      bypass in 70's     Depression Paternal Grandfather      in old age     Respiratory Paternal Grandfather      sleep apnea     Hypertension Maternal Grandfather      Alzheimer Disease Maternal Grandfather      in late 70's     GASTROINTESTINAL DISEASE Paternal Grandmother       of gallstone pancreatitis at age 50     Cancer Maternal Grandmother       of ovarian cancer at age 50     Coronary Artery Disease Early Onset Other      father's side of family - late 50's with normal LDLs and BP's      Aneurysm Paternal Half-Sister      Colon Cancer Maternal Uncle      3 maternal great-uncles with colon cancer      Aneurysm Paternal Uncle        Prior to Admission medications    Medication Sig Start Date End Date Taking? Authorizing Provider   candesartan (ATACAND) 8 MG TABS Take 1 tablet (8 mg) by mouth daily 3/1/18  Yes Patti Purcell MD   Cholecalciferol (VITAMIN D) 2000 UNITS CAPS Take by mouth daily    Yes Reported, Patient   Coenzyme Q10 (COQ10) 50 MG CAPS Take 70 mg by mouth daily   Yes Reported, Patient   doxycycline (VIBRAMYCIN) 100 MG capsule Take 2 capsules (200 mg) by mouth once a week 18  Yes Shelly Stahl MD   GLUCOSAMINE 500 MG OR CAPS 1 tab BID 7/3/07  Yes oJb Stuart MD   MULTI-VITAMIN OR TABS 1 TABLET DAILY 7/3/07  Yes Job Stuart MD   norethindrone-ethinyl estradiol (MICROGESTIN ) 1-20  "MG-MCG per tablet Take 1 tablet by mouth daily 12/21/17  Yes Sujey Hennessy MD   Nutritional Supplements (GRAPESEED EXTRACT PO) Take by mouth daily   Yes Reported, Patient   OMEGA-3 CAPS   OR 2 caps Twice daily, 7/3/07  Yes Job Stuart MD   PROBIOTICA OR CHEW take one tablet daily 7/3/07  Yes Job Stuart MD   topiramate (TOPAMAX) 50 MG tablet Take 1 tablet (50 mg) by mouth daily 3/1/18  Yes Patti Purcell MD   atovaquone-proguanil (MALARONE) 250-100 MG per tablet Take 1 tablet by mouth daily Start 2 days before travel and continue 7 days after return. 7/13/18   Shelly Stahl MD       Allergies   Allergen Reactions     No Known Drug Allergies         REVIEW OF SYSTEMS:   5 point ROS negative except as noted above in HPI, including Gen., Resp., CV, GI &  system review.    PHYSICAL EXAM:   There were no vitals taken for this visit. Estimated body mass index is 22.14 kg/(m^2) as calculated from the following:    Height as of 7/13/18: 1.626 m (5' 4\").    Weight as of 7/13/18: 58.5 kg (129 lb).   GENERAL APPEARANCE: alert, and oriented  MENTAL STATUS: alert  AIRWAY EXAM: Mallampatti Class I (visualization of the soft palate, fauces, uvula, anterior and posterior pillars)  RESP: lungs clear to auscultation - no rales, rhonchi or wheezes  CV: regular rates and rhythm  DIAGNOSTICS:    Not indicated    IMPRESSION   ASA Class 1 - Healthy patient, no medical problems    PLAN:   Plan for Colonoscopy with possible biopsy, possible polypectomy. We discussed the risks, benefits and alternatives and the patient wished to proceed.    The above has been forwarded to the consulting provider.      Signed Electronically by: Alexander Castro  October 12, 2018          "

## 2018-10-12 NOTE — IP AVS SNAPSHOT
[facility]    [address] [facility] [address]    Phone:  [phone]    Fax:  [phone]                                       After Visit Summary   10/12/2018    DR Evita PEDERSON Sotero    MRN: [id number]           After Visit Summary Signature Page     I have received my discharge instructions, and my questions have been answered. I have discussed any challenges I see with this plan with the nurse or doctor.    ..........................................................................................................................................  Patient/Patient Representative Signature      ..........................................................................................................................................  Patient Representative Print Name and Relationship to Patient    ..................................................               ................................................  Date                                   Time    ..........................................................................................................................................  Reviewed by Signature/Title    ...................................................              ..............................................  Date                                               Time          22EPIC Rev 08/18

## 2018-10-12 NOTE — IP AVS SNAPSHOT
"                  MRN:[id number]                      After Visit Summary   10/12/2018    DR Evita PEDERSON Sotero    MRN: [id number]           Thank you!     Thank you for choosing [facility] for your care. Our goal is always to provide you with excellent care. Hearing back from our patients is one way we can continue to improve our services. Please take a few minutes to complete the written survey that you may receive in the mail after you visit. If you would like to speak to someone directly about your visit please contact Patient Relations at [phone]. Thank you!          Patient Information     Date Of Birth          1970        About your hospital stay     You were admitted on:  October 12, 2018 You last received care in the:  [facility]    You were discharged on:  October 12, 2018       Who to Call     For medical emergencies, please call 911.  For non-urgent questions about your medical care, please call your primary care provider or clinic, [phone]  For questions related to your surgery, please call your surgery clinic        Attending Provider     Provider Specialty    Alexander Castro MD Gastroenterology       Primary Care Provider Office Phone # Fax #    Patti LAURY Purcell [phone]      Further instructions from your care team             Pending Results     No orders found from 10/10/2018 to 10/13/2018.            Admission Information     Date & Time Provider Department Dept. Phone    10/12/2018 Alexander Castro MD [facility] [phone]      Your Vitals Were     Blood Pressure Respirations Height Weight Pulse Oximetry BMI (Body Mass Index)    105/76 16 1.626 m (5' 4\") 58.5 kg (129 lb) 98% 22.14 kg/m2      [facility] Information     [employer] gives you secure access to your electronic health record. If you see a primary care provider, you can also send messages to your care team and make appointments. If you have questions, please " call your primary care clinic.  If you do not have a primary care provider, please call [phone] and they will assist you.        Care EveryWhere ID     This is your Care EveryWhere ID. This could be used by other organizations to access your [facility] medical records  [id number]        Equal Access to Services     RONNIE ENGLE : Hadii aad ku hadsarahmilena Sobrettali, waaxda luqadaha, qaybta kaalmada nahidfeicandelario, cathy kwongmonagamal thomas. So [facility] [phone].    ATENCIÓN: Si habla español, tiene a hernandez disposición servicios gratuitos de asistencia lingüística. Espinoza al [phone].    We comply with applicable federal civil rights laws and [place] laws. We do not discriminate on the basis of race, color, national origin, age, disability, sex, sexual orientation, or gender identity.               Review of your medicines      CONTINUE these medicines which have NOT CHANGED        Dose / Directions    atovaquone-proguanil 250-100 MG per tablet   Commonly known as:  MALARONE   Used for:  Counseling for travel        Dose:  1 tablet   Take 1 tablet by mouth daily Start 2 days before travel and continue 7 days after return.   Quantity:  30 tablet   Refills:  0       candesartan 8 MG Tabs   Commonly known as:  ATACAND   Used for:  Essential hypertension with goal blood pressure less than 140/90        Dose:  8 mg   Take 1 tablet (8 mg) by mouth daily   Quantity:  90 tablet   Refills:  3       CoQ10 50 MG Caps        Dose:  70 mg   Take 70 mg by mouth daily   Refills:  0       doxycycline 100 MG capsule   Commonly known as:  VIBRAMYCIN   Used for:  Counseling for travel        Dose:  200 mg   Take 2 capsules (200 mg) by mouth once a week   Quantity:  6 capsule   Refills:  0       glucosamine 500 MG Caps   Used for:  Other general counseling and advice for contraceptive management, Routine gynecological examination        1 tab BID   Refills:  0       GRAPESEED EXTRACT PO        Take by mouth daily   Refills:   0       Multi-vitamin Tabs tablet   Used for:  Other general counseling and advice for contraceptive management, Routine gynecological examination   Generic drug:  multivitamin, therapeutic with minerals        1 TABLET DAILY   Quantity:  30   Refills:  0       norethindrone-ethinyl estradiol 1-20 MG-MCG per tablet   Commonly known as:  MICROGESTIN 1/20   Used for:  Encounter for initial prescription of contraceptive pills        Dose:  1 tablet   Take 1 tablet by mouth daily   Quantity:  63 tablet   Refills:  3       OMEGA-3 CAPS   OR   Used for:  Other general counseling and advice for contraceptive management, Routine gynecological examination        2 caps Twice daily,   Refills:  0       PROBIOTICA Chew   Used for:  Other general counseling and advice for contraceptive management, Routine gynecological examination        take one tablet daily   Refills:  0       topiramate 50 MG tablet   Commonly known as:  TOPAMAX   Used for:  Episodic tension-type headache, not intractable        Dose:  50 mg   Take 1 tablet (50 mg) by mouth daily   Quantity:  90 tablet   Refills:  3       vitamin D 2000 units Caps        Take by mouth daily   Refills:  0                Protect others around you: Learn how to safely use, store and throw away your medicines at www.disposemymeds.org.             Medication List: This is a list of all your medications and when to take them. Check marks below indicate your daily home schedule. Keep this list as a reference.      Medications           Morning Afternoon Evening Bedtime As Needed    atovaquone-proguanil 250-100 MG per tablet   Commonly known as:  MALARONE   Take 1 tablet by mouth daily Start 2 days before travel and continue 7 days after return.                                candesartan 8 MG Tabs   Commonly known as:  ATACAND   Take 1 tablet (8 mg) by mouth daily                                CoQ10 50 MG Caps   Take 70 mg by mouth daily                                doxycycline  100 MG capsule   Commonly known as:  VIBRAMYCIN   Take 2 capsules (200 mg) by mouth once a week                                glucosamine 500 MG Caps   1 tab BID                                GRAPESEED EXTRACT PO   Take by mouth daily                                Multi-vitamin Tabs tablet   1 TABLET DAILY   Generic drug:  multivitamin, therapeutic with minerals                                norethindrone-ethinyl estradiol 1-20 MG-MCG per tablet   Commonly known as:  MICROGESTIN 1/20   Take 1 tablet by mouth daily                                OMEGA-3 CAPS   OR   2 caps Twice daily,                                PROBIOTICA Chew   take one tablet daily                                topiramate 50 MG tablet   Commonly known as:  TOPAMAX   Take 1 tablet (50 mg) by mouth daily                                vitamin D 2000 units Caps   Take by mouth daily

## 2019-01-11 ENCOUNTER — OFFICE VISIT (OUTPATIENT)
Dept: OBGYN | Facility: CLINIC | Age: 49
End: 2019-01-11
Payer: COMMERCIAL

## 2019-01-11 ENCOUNTER — ANCILLARY PROCEDURE (OUTPATIENT)
Dept: ULTRASOUND IMAGING | Facility: CLINIC | Age: 49
End: 2019-01-11
Payer: COMMERCIAL

## 2019-01-11 VITALS
BODY MASS INDEX: 22.31 KG/M2 | HEART RATE: 80 BPM | DIASTOLIC BLOOD PRESSURE: 80 MMHG | WEIGHT: 130 LBS | SYSTOLIC BLOOD PRESSURE: 110 MMHG

## 2019-01-11 DIAGNOSIS — N93.0 POSTCOITAL BLEEDING: Primary | ICD-10-CM

## 2019-01-11 DIAGNOSIS — Z01.419 ENCOUNTER FOR GYNECOLOGICAL EXAMINATION WITHOUT ABNORMAL FINDING: ICD-10-CM

## 2019-01-11 DIAGNOSIS — N93.0 POSTCOITAL BLEEDING: ICD-10-CM

## 2019-01-11 DIAGNOSIS — Z30.011 ENCOUNTER FOR INITIAL PRESCRIPTION OF CONTRACEPTIVE PILLS: ICD-10-CM

## 2019-01-11 PROCEDURE — 87624 HPV HI-RISK TYP POOLED RSLT: CPT | Performed by: OBSTETRICS & GYNECOLOGY

## 2019-01-11 PROCEDURE — 76856 US EXAM PELVIC COMPLETE: CPT | Performed by: OBSTETRICS & GYNECOLOGY

## 2019-01-11 PROCEDURE — G0145 SCR C/V CYTO,THINLAYER,RESCR: HCPCS | Performed by: OBSTETRICS & GYNECOLOGY

## 2019-01-11 PROCEDURE — 99396 PREV VISIT EST AGE 40-64: CPT | Performed by: OBSTETRICS & GYNECOLOGY

## 2019-01-11 PROCEDURE — 76830 TRANSVAGINAL US NON-OB: CPT | Performed by: OBSTETRICS & GYNECOLOGY

## 2019-01-11 RX ORDER — MISOPROSTOL 200 UG/1
TABLET ORAL
Qty: 2 TABLET | Refills: 0 | Status: SHIPPED | OUTPATIENT
Start: 2019-01-11 | End: 2019-04-25

## 2019-01-11 RX ORDER — NORETHINDRONE ACETATE AND ETHINYL ESTRADIOL .02; 1 MG/1; MG/1
TABLET ORAL
Qty: 84 TABLET | Refills: 4 | Status: SHIPPED | OUTPATIENT
Start: 2019-01-11 | End: 2019-04-25 | Stop reason: ALTCHOICE

## 2019-01-11 NOTE — NURSING NOTE
"Chief Complaint   Patient presents with     Physical     last pap 17 NIL - HPV negative - mammogram 17 - bleeding with intercourse x's 2 years       Initial /80 (BP Location: Left arm, Patient Position: Chair, Cuff Size: Adult Regular)   Pulse 80   Wt 59 kg (130 lb)   BMI 22.31 kg/m   Estimated body mass index is 22.31 kg/m  as calculated from the following:    Height as of 10/12/18: 1.626 m (5' 4\").    Weight as of this encounter: 59 kg (130 lb).  BP completed using cuff size: regular    Questioned patient about current smoking habits.  Pt. has never smoked.          The following HM Due: NONE      Nurse assisted visit.  Amita Russell MA.             "

## 2019-01-15 ENCOUNTER — OFFICE VISIT (OUTPATIENT)
Dept: OBGYN | Facility: CLINIC | Age: 49
End: 2019-01-15
Payer: COMMERCIAL

## 2019-01-15 VITALS — DIASTOLIC BLOOD PRESSURE: 80 MMHG | SYSTOLIC BLOOD PRESSURE: 126 MMHG | HEART RATE: 68 BPM

## 2019-01-15 DIAGNOSIS — N93.0 POSTCOITAL BLEEDING: Primary | ICD-10-CM

## 2019-01-15 LAB
COPATH REPORT: NORMAL
PAP: NORMAL

## 2019-01-15 PROCEDURE — 58100 BIOPSY OF UTERUS LINING: CPT | Performed by: OBSTETRICS & GYNECOLOGY

## 2019-01-15 PROCEDURE — 88305 TISSUE EXAM BY PATHOLOGIST: CPT | Performed by: OBSTETRICS & GYNECOLOGY

## 2019-01-15 RX ORDER — DROSPIRENONE AND ETHINYL ESTRADIOL 0.03MG-3MG
1 KIT ORAL DAILY
Qty: 84 TABLET | Refills: 4 | Status: SHIPPED | OUTPATIENT
Start: 2019-01-15 | End: 2019-04-25

## 2019-01-15 NOTE — PROGRESS NOTES
SUBJECTIVE: Evita Bey is a 48 year old female  female. OB Patient's last menstrual period was 12/25/2018 (exact date)..  She is here for endometrial biopsy. The details of EMB were reviewed, as well as the risks of pain, infection and bleeding. Risks also include risk of uterine perforation, PID and possible pregnancy and risk of failure to remove all abnormality and/or recurrence of condition.  All questions were answered before proceeding, and informed consent was therefore obtained.    Allergies   Allergen Reactions     No Known Drug Allergies      Current Outpatient Medications   Medication Sig Dispense Refill     Cholecalciferol (VITAMIN D) 2000 UNITS CAPS Take by mouth daily        drospirenone-ethinyl estradiol (LAURA) 3-0.03 MG tablet Take 1 tablet by mouth daily 84 tablet 4     GLUCOSAMINE 500 MG OR CAPS 1 tab BID  0     misoprostol (CYTOTEC) 200 MCG tablet Take two tablets 2 hours before procedure 2 tablet 0     MULTI-VITAMIN OR TABS 1 TABLET DAILY 30 0     norethindrone-ethinyl estradiol (MICROGESTIN 1/20) 1-20 MG-MCG tablet Take one tablet daily for 3 months; stop for 7 days and restart 84 tablet 4     Nutritional Supplements (GRAPESEED EXTRACT PO) Take by mouth daily       OMEGA-3 CAPS   OR 2 caps Twice daily,  0     PROBIOTICA OR CHEW take one tablet daily  0     topiramate (TOPAMAX) 50 MG tablet Take 1 tablet (50 mg) by mouth daily 90 tablet 3     atovaquone-proguanil (MALARONE) 250-100 MG per tablet Take 1 tablet by mouth daily Start 2 days before travel and continue 7 days after return. (Patient not taking: Reported on 1/11/2019) 30 tablet 0     candesartan (ATACAND) 8 MG TABS Take 1 tablet (8 mg) by mouth daily 90 tablet 3     Coenzyme Q10 (COQ10) 50 MG CAPS Take 70 mg by mouth daily       doxycycline (VIBRAMYCIN) 100 MG capsule Take 2 capsules (200 mg) by mouth once a week (Patient not taking: Reported on 1/11/2019) 6 capsule 0      Past Medical History:   Diagnosis Date     Calculus of  kidney 1996    Calcium stone - etiology after w/u was excess calcium intake     Fam hx-cardiovas dis NEC     father with MI at age 58 yo, s/p PTCA     Family history of ovarian cancer - following with Dr. Stuart - ? BSO in late 40's       Fertility testing     used lifetime total of 11 months of Clomid and 2 months of gonadotropins     Hypertension 2017     Volvulus (H) child    Resolved without surgery - hospitalized for 1 day     Family History   Problem Relation Age of Onset     Hypertension Mother      Osteoporosis Mother         Osteopenia     Bladder Cancer Mother      Gallbladder Disease Mother         Gallbladder removed- with some dysplasia noted     C.A.D. Father         MI at age 59 and stented -      Hypertension Father      Gastrointestinal Disease Father         gallstones and gallstone pancreatitis at age 61     Lipids Father         low HDL     Genitourinary Problems Father         uric acid kidney stone     C.A.D. Paternal Grandfather         bypass in 70's     Depression Paternal Grandfather         in old age     Respiratory Paternal Grandfather         sleep apnea     Hypertension Maternal Grandfather      Alzheimer Disease Maternal Grandfather         in late 70's     Gastrointestinal Disease Paternal Grandmother          of gallstone pancreatitis at age 50     Cancer Maternal Grandmother          of ovarian cancer at age 50     Coronary Artery Disease Early Onset Other         father's side of family - late 50's with normal LDLs and BP's      Aneurysm Paternal Half-Sister      Colon Cancer Maternal Uncle         3 maternal great-uncles with colon cancer      Aneurysm Paternal Uncle       Social History     Socioeconomic History     Marital status:      Spouse name: Juan     Number of children: 4     Years of education: 20     Highest education level: Not on file   Social Needs     Financial resource strain: Not on file     Food insecurity - worry: Not on file     Food  insecurity - inability: Not on file     Transportation needs - medical: Not on file     Transportation needs - non-medical: Not on file   Occupational History     Occupation: physician     Employer: [employer]     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 [place]    Tobacco Use     Smoking status: Never Smoker     Smokeless tobacco: Never Used   Substance and Sexual Activity     Alcohol use: Yes     Alcohol/week: 0.0 oz     Comment: 0-1 wine cooler a week     Drug use: No     Sexual activity: Yes     Partners: Male     Birth control/protection: Pill   Other Topics Concern      Service No     Blood Transfusions No     Caffeine Concern No     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 No   Social History Narrative     Not on file     Past Surgical History:   Procedure Laterality Date     C  DELIVERY ONLY  94,96,99,02    4 C/S     COLONOSCOPY      Dr. Castro [facility]     COLONOSCOPY N/A 10/12/2018    Procedure: COLONOSCOPY;  COLONOSCOPY [fv]  ;  Surgeon: Alexander Castro MD;  Location:  GI       EXAM:  /80 (BP Location: Right arm, Patient Position: Chair, Cuff Size: Adult Regular)   Pulse 68   LMP 2018 (Exact Date)   Abdomen: soft, nontender, without hepatosplenomegaly or masses  : normal cervix, adnexae, and uterus without masses or discharge      ASSESSMENT/PLAN:  (N93.0) Postcoital bleeding  (primary encounter diagnosis)  Plan: drospirenone-ethinyl estradiol (LAURA) 3-0.03         MG tablet           Procedure Note;       cervix visualized with speculum          grasped with tenaculum          sounded to 8 cm          moderate amount of tissue obtained and sent to pathology    All instruments  removed from uterus, cervix and vagina      Results of pathology when available  Recommend change of OCP from Loestrin 1/20 to Kimber[place]

## 2019-01-15 NOTE — NURSING NOTE
"Chief Complaint   Patient presents with     RECHECK     US follow-up - EMB       Initial /80 (BP Location: Right arm, Patient Position: Chair, Cuff Size: Adult Regular)   Pulse 68   LMP 2018 (Exact Date)  Estimated body mass index is 22.31 kg/m  as calculated from the following:    Height as of 10/12/18: 1.626 m (5' 4\").    Weight as of 19: 59 kg (130 lb).  BP completed using cuff size: regular    Questioned patient about current smoking habits.  Pt. has never smoked.          The following HM Due: NONE      Nurse assisted visit.  Amita Russell MA.               "

## 2019-01-17 LAB
COPATH REPORT: NORMAL
FINAL DIAGNOSIS: NORMAL
HPV HR 12 DNA CVX QL NAA+PROBE: NEGATIVE
HPV16 DNA SPEC QL NAA+PROBE: NEGATIVE
HPV18 DNA SPEC QL NAA+PROBE: NEGATIVE
SPECIMEN DESCRIPTION: NORMAL
SPECIMEN SOURCE CVX/VAG CYTO: NORMAL

## 2019-03-15 DIAGNOSIS — G44.219 EPISODIC TENSION-TYPE HEADACHE, NOT INTRACTABLE: ICD-10-CM

## 2019-03-15 DIAGNOSIS — I10 ESSENTIAL HYPERTENSION WITH GOAL BLOOD PRESSURE LESS THAN 140/90: ICD-10-CM

## 2019-03-16 NOTE — TELEPHONE ENCOUNTER
"Requested Prescriptions   Pending Prescriptions Disp Refills     topiramate (TOPAMAX) 50 MG tablet [Pharmacy Med Name: TOPIRAMATE 50MG TABS]  Last Written Prescription Date:  3/1/18  Last Fill Quantity: 90,  # refills: 3   Last office visit: 7/13/2018 with prescribing provider:  Binh   Future Office Visit:     90 tablet 0     Sig: TAKE ONE TABLET BY MOUTH EVERY DAY    Anti-Seizure Meds Protocol  Failed - 3/15/2019 10:46 AM       Failed - Recent (12 mo) or future (30 days) visit within the authorizing provider's specialty    Patient had office visit in the last 12 months or has a visit in the next 30 days with authorizing provider or within the authorizing provider's specialty.  See \"Patient Info\" tab in inbasket, or \"Choose Columns\" in Meds & Orders section of the refill encounter.           Failed - Review Authorizing provider's last note.     Refer to last progress notes: confirm request is for original authorizing provider (cannot be through other providers).       Passed - Normal CBC on file in past 26 months    Recent Labs   Lab Test 03/01/18  1447   WBC 9.0   RBC 4.13   HGB 12.8   HCT 38.3             Passed - Normal ALT or AST on file in past 26 months    Recent Labs   Lab Test 01/26/17  1138   ALT 32     Recent Labs   Lab Test 01/26/17  1138   AST 26          Passed - Normal platelet count on file in past 26 months    Recent Labs   Lab Test 03/01/18  1447             Passed - Medication is active on med list       Passed - No active pregnancy on record       Passed - No positive pregnancy test in last 12 months        candesartan (ATACAND) 8 MG tablet [Pharmacy Med Name: CANDESARTAN CILEXETIL 8MG TABS]  Last Written Prescription Date:  3/1/18  Last Fill Quantity: 90,  # refills: 3   Last office visit: 7/13/2018 with prescribing provider:  Binh   Future Office Visit:     90 tablet 0     Sig: TAKE ONE TABLET BY MOUTH EVERY DAY    Angiotensin-II Receptors Failed - 3/15/2019 10:46 AM " "      Failed - Recent (12 mo) or future (30 days) visit within the authorizing provider's specialty    Patient had office visit in the last 12 months or has a visit in the next 30 days with authorizing provider or within the authorizing provider's specialty.  See \"Patient Info\" tab in inbasket, or \"Choose Columns\" in Meds & Orders section of the refill encounter.             Failed - Normal serum creatinine on file in past 12 months    Recent Labs   Lab Test 03/01/18  1447   CR 0.92            Failed - Normal serum potassium on file in past 12 months    Recent Labs   Lab Test 03/01/18  1447   POTASSIUM 3.5           Passed - Blood pressure under 140/90 in past 12 months    BP Readings from Last 3 Encounters:   01/15/19 126/80   01/11/19 110/80   10/12/18 110/89          Passed - Medication is active on med list       Passed - Patient is age 18 or older       Passed - No active pregnancy on record       Passed - No positive pregnancy test in past 12 months          "

## 2019-03-18 RX ORDER — CANDESARTAN 8 MG/1
TABLET ORAL
Qty: 30 TABLET | Refills: 0 | Status: SHIPPED | OUTPATIENT
Start: 2019-03-18 | End: 2019-04-17

## 2019-03-18 RX ORDER — TOPIRAMATE 50 MG/1
TABLET, FILM COATED ORAL
Qty: 30 TABLET | Refills: 0 | Status: SHIPPED | OUTPATIENT
Start: 2019-03-18 | End: 2019-04-17

## 2019-03-18 NOTE — TELEPHONE ENCOUNTER
Due for an Office visit for further refills, only fill for 30 days     Zainab Bonner RN, BSN  [facility]

## 2019-04-17 ENCOUNTER — MYC REFILL (OUTPATIENT)
Dept: FAMILY MEDICINE | Facility: CLINIC | Age: 49
End: 2019-04-17

## 2019-04-17 DIAGNOSIS — G44.219 EPISODIC TENSION-TYPE HEADACHE, NOT INTRACTABLE: ICD-10-CM

## 2019-04-17 DIAGNOSIS — I10 ESSENTIAL HYPERTENSION WITH GOAL BLOOD PRESSURE LESS THAN 140/90: ICD-10-CM

## 2019-04-17 RX ORDER — TOPIRAMATE 50 MG/1
50 TABLET, FILM COATED ORAL DAILY
Qty: 30 TABLET | Refills: 0 | Status: SHIPPED | OUTPATIENT
Start: 2019-04-17 | End: 2019-04-25

## 2019-04-17 RX ORDER — CANDESARTAN 8 MG/1
8 TABLET ORAL DAILY
Qty: 30 TABLET | Refills: 0 | Status: SHIPPED | OUTPATIENT
Start: 2019-04-17 | End: 2019-04-25

## 2019-04-17 RX ORDER — CANDESARTAN 8 MG/1
TABLET ORAL
Qty: 30 TABLET | Refills: 0 | OUTPATIENT
Start: 2019-04-17

## 2019-04-17 RX ORDER — TOPIRAMATE 50 MG/1
TABLET, FILM COATED ORAL
Qty: 30 TABLET | Refills: 0 | OUTPATIENT
Start: 2019-04-17

## 2019-04-17 NOTE — TELEPHONE ENCOUNTER
"Requested Prescriptions   Pending Prescriptions Disp Refills     topiramate (TOPAMAX) 50 MG tablet 30 tablet 0     Sig: Take 1 tablet (50 mg) by mouth daily       Last Refill:   topiramate (TOPAMAX) 50 MG tablet 30 tablet 0 3/18/2019  No   Sig: TAKE ONE TABLET BY MOUTH EVERY DAY   Sent to pharmacy as: topiramate (TOPAMAX) 50 MG tablet   Class: E-Prescribe   Notes to Pharmacy: Due for an Office visit for further refills, only fill for 30 days     Anti-Seizure Meds Protocol  Failed - 4/17/2019  8:15 AM        Failed - Review Authorizing provider's last note.      Refer to last progress notes: confirm request is for original authorizing provider (cannot be through other providers).        Failed - Normal ALT or AST on file in past 26 months     Recent Labs   Lab Test 01/26/17  1138   ALT 32     Recent Labs   Lab Test 01/26/17  1138   AST 26           Passed - Recent (12 mo) or future (30 days) visit within the authorizing provider's specialty     Patient had office visit in the last 12 months or has a visit in the next 30 days with authorizing provider or within the authorizing provider's specialty.  See \"Patient Info\" tab in inbasket, or \"Choose Columns\" in Meds & Orders section of the refill encounter.      LOV: 3/1/18          Passed - Normal CBC on file in past 26 months     Recent Labs   Lab Test 03/01/18  1447   WBC 9.0   RBC 4.13   HGB 12.8   HCT 38.3              Passed - Normal platelet count on file in past 26 months     Recent Labs   Lab Test 03/01/18  1447              Passed - Medication is active on med list        Passed - No active pregnancy on record        Passed - No positive pregnancy test in last 12 months        candesartan (ATACAND) 8 MG tablet 30 tablet 0     Sig: Take 1 tablet (8 mg) by mouth daily       Last Refill:   candesartan (ATACAND) 8 MG tablet 30 tablet 0 3/18/2019  No   Sig: TAKE ONE TABLET BY MOUTH EVERY DAY   Sent to pharmacy as: candesartan (ATACAND) 8 MG tablet " "  Class: E-Prescribe   Notes to Pharmacy: Due for an Office visit for further refills, only fill for 30 days     Angiotensin-II Receptors Failed - 4/17/2019  8:15 AM        Failed - Normal serum creatinine on file in past 12 months     Recent Labs   Lab Test 03/01/18  1447   CR 0.92           Failed - Normal serum potassium on file in past 12 months     Recent Labs   Lab Test 03/01/18  1447   POTASSIUM 3.5            Passed - Blood pressure under 140/90 in past 12 months     BP Readings from Last 3 Encounters:   01/15/19 126/80   01/11/19 110/80   10/12/18 110/89           Passed - Recent (12 mo) or future (30 days) visit within the authorizing provider's specialty     Patient had office visit in the last 12 months or has a visit in the next 30 days with authorizing provider or within the authorizing provider's specialty.  See \"Patient Info\" tab in inbasket, or \"Choose Columns\" in Meds & Orders section of the refill encounter.      LOV: 3/1/18          Passed - Medication is active on med list        Passed - Patient is age 18 or older        Passed - No active pregnancy on record        Passed - No positive pregnancy test in past 12 months        Patient already given 1x corby refill and did not follow up - scheduled for 4/25/19  30 day supply sent    Norah Du RN  [facility]  "

## 2019-04-25 ENCOUNTER — OFFICE VISIT (OUTPATIENT)
Dept: FAMILY MEDICINE | Facility: CLINIC | Age: 49
End: 2019-04-25
Payer: COMMERCIAL

## 2019-04-25 VITALS
OXYGEN SATURATION: 100 % | BODY MASS INDEX: 20.66 KG/M2 | DIASTOLIC BLOOD PRESSURE: 80 MMHG | HEIGHT: 64 IN | HEART RATE: 95 BPM | TEMPERATURE: 98.2 F | WEIGHT: 121 LBS | SYSTOLIC BLOOD PRESSURE: 128 MMHG

## 2019-04-25 DIAGNOSIS — R63.4 UNINTENTIONAL WEIGHT LOSS: ICD-10-CM

## 2019-04-25 DIAGNOSIS — R68.81 EARLY SATIETY: ICD-10-CM

## 2019-04-25 DIAGNOSIS — Z12.31 VISIT FOR SCREENING MAMMOGRAM: ICD-10-CM

## 2019-04-25 DIAGNOSIS — Z11.4 SCREENING FOR HIV (HUMAN IMMUNODEFICIENCY VIRUS): ICD-10-CM

## 2019-04-25 DIAGNOSIS — Z23 NEED FOR PROPHYLACTIC VACCINATION AND INOCULATION AGAINST INFLUENZA: ICD-10-CM

## 2019-04-25 DIAGNOSIS — Z23 NEED FOR SHINGLES VACCINE: ICD-10-CM

## 2019-04-25 DIAGNOSIS — G44.219 EPISODIC TENSION-TYPE HEADACHE, NOT INTRACTABLE: ICD-10-CM

## 2019-04-25 DIAGNOSIS — Z60.0: ICD-10-CM

## 2019-04-25 DIAGNOSIS — N93.0 POSTCOITAL BLEEDING: ICD-10-CM

## 2019-04-25 DIAGNOSIS — I10 ESSENTIAL HYPERTENSION WITH GOAL BLOOD PRESSURE LESS THAN 140/90: Primary | ICD-10-CM

## 2019-04-25 LAB
BASOPHILS # BLD AUTO: 0 10E9/L (ref 0–0.2)
BASOPHILS NFR BLD AUTO: 0.1 %
DIFFERENTIAL METHOD BLD: NORMAL
EOSINOPHIL # BLD AUTO: 0 10E9/L (ref 0–0.7)
EOSINOPHIL NFR BLD AUTO: 0.2 %
ERYTHROCYTE [DISTWIDTH] IN BLOOD BY AUTOMATED COUNT: 12.3 % (ref 10–15)
HCT VFR BLD AUTO: 40.8 % (ref 35–47)
HGB BLD-MCNC: 14 G/DL (ref 11.7–15.7)
LYMPHOCYTES # BLD AUTO: 1.8 10E9/L (ref 0.8–5.3)
LYMPHOCYTES NFR BLD AUTO: 20.2 %
MCH RBC QN AUTO: 31.7 PG (ref 26.5–33)
MCHC RBC AUTO-ENTMCNC: 34.3 G/DL (ref 31.5–36.5)
MCV RBC AUTO: 92 FL (ref 78–100)
MONOCYTES # BLD AUTO: 0.6 10E9/L (ref 0–1.3)
MONOCYTES NFR BLD AUTO: 6.9 %
NEUTROPHILS # BLD AUTO: 6.6 10E9/L (ref 1.6–8.3)
NEUTROPHILS NFR BLD AUTO: 72.6 %
PLATELET # BLD AUTO: 250 10E9/L (ref 150–450)
RBC # BLD AUTO: 4.42 10E12/L (ref 3.8–5.2)
WBC # BLD AUTO: 9 10E9/L (ref 4–11)

## 2019-04-25 PROCEDURE — 80053 COMPREHEN METABOLIC PANEL: CPT | Performed by: FAMILY MEDICINE

## 2019-04-25 PROCEDURE — 99214 OFFICE O/P EST MOD 30 MIN: CPT | Performed by: FAMILY MEDICINE

## 2019-04-25 PROCEDURE — 85025 COMPLETE CBC W/AUTO DIFF WBC: CPT | Performed by: FAMILY MEDICINE

## 2019-04-25 PROCEDURE — 84443 ASSAY THYROID STIM HORMONE: CPT | Performed by: FAMILY MEDICINE

## 2019-04-25 PROCEDURE — 82043 UR ALBUMIN QUANTITATIVE: CPT | Performed by: FAMILY MEDICINE

## 2019-04-25 PROCEDURE — 36415 COLL VENOUS BLD VENIPUNCTURE: CPT | Performed by: FAMILY MEDICINE

## 2019-04-25 RX ORDER — CANDESARTAN 8 MG/1
8 TABLET ORAL DAILY
Qty: 90 TABLET | Refills: 3 | Status: SHIPPED | OUTPATIENT
Start: 2019-04-25 | End: 2020-03-18

## 2019-04-25 RX ORDER — TOPIRAMATE 50 MG/1
50 TABLET, FILM COATED ORAL DAILY
Qty: 90 TABLET | Refills: 3 | Status: SHIPPED | OUTPATIENT
Start: 2019-04-25 | End: 2020-11-24

## 2019-04-25 RX ORDER — DROSPIRENONE AND ETHINYL ESTRADIOL 0.03MG-3MG
1 KIT ORAL DAILY
Qty: 84 TABLET | Refills: 3 | Status: SHIPPED | OUTPATIENT
Start: 2019-04-25 | End: 2020-03-25

## 2019-04-25 SDOH — SOCIAL STABILITY - SOCIAL INSECURITY: PROBLEMS OF ADJUSTMENT TO LIFE-CYCLE TRANSITIONS: Z60.0

## 2019-04-25 ASSESSMENT — MIFFLIN-ST. JEOR: SCORE: 1148.85

## 2019-04-25 NOTE — PROGRESS NOTES
SUBJECTIVE:                                                    Evita Bey is a 49 year old female who presents to clinic today for the following health issues:    Hypertension Follow-up      Outpatient blood pressures are not being checked.    Low Salt Diet: no added salt.     BP Readings from Last 5 Encounters:   01/15/19 126/80   01/11/19 110/80   10/12/18 110/89   07/13/18 119/83   03/01/18 120/76         Amount of exercise or physical activity: 4-5 days/week for an average of 45-60 minutes    Problems taking medications regularly: No    Medication side effects: possible med side effect from BC pills - can't eat and losing weight    Diet: regular (no restrictions)    Seeing Dr. Stuart for   Took off Nuvaring secondary to spotting after intercourse. Saw Dr. Sujey Hennessy., then Dr. Alfonso Stuart. [facility]  - tried Samia = .02mg of estradiol.  , still had some breakthrough,  Then changed to Yuni.       She's lost a lot of weight without trying at all. Doesn't have much of an appetite.  Tries to eat,has to  Force herself , then has some early satiety.  No abdominal pain. At all. Doesn't feel hungry.   No melena or hematochezia . Normal bowel movement.   Has some pretty intense empty nest syndrome symptoms ( 3/4 of her kids are now out of the house)  - declines phq9 today.    Was down to 119 lbs.   Wt Readings from Last 5 Encounters:   04/25/19 54.9 kg (121 lb)   01/11/19 59 kg (130 lb)   10/12/18 58.5 kg (129 lb)   07/13/18 58.5 kg (129 lb)   03/01/18 59.2 kg (130 lb 9.6 oz)     Does 35-60 min of cardio 5-6 days/week. Weight lifts several days/week as well - this is increased from previous since her kids have been out of house.      Not sleeping well - has difficulty falling asleep and staying asleep.        TSH   Date Value Ref Range Status   03/01/2018 1.33 0.40 - 4.00 mU/L Final      Had endometrial biopsy = normal.   Strong fam hx of cancer.     Family History   Problem Relation Age of Onset      Hypertension Mother      Osteoporosis Mother         Osteopenia     Bladder Cancer Mother      Gallbladder Disease Mother         Gallbladder removed- with some dysplasia noted     C.A.D. Father         MI at age 59 and stented -      Hypertension Father      Gastrointestinal Disease Father         gallstones and gallstone pancreatitis at age 61     Lipids Father         low HDL     Genitourinary Problems Father         uric acid kidney stone     C.A.D. Paternal Grandfather         bypass in 70's     Depression Paternal Grandfather         in old age     Respiratory Paternal Grandfather         sleep apnea     Hypertension Maternal Grandfather      Alzheimer Disease Maternal Grandfather         in late 70's     Gastrointestinal Disease Paternal Grandmother          of gallstone pancreatitis at age 50     Cancer Maternal Grandmother          of ovarian cancer at age 50     Coronary Artery Disease Early Onset Other         father's side of family - late 50's with normal LDLs and BP's      Aneurysm Paternal Half-Sister      Colon Cancer Maternal Uncle         3 maternal great-uncles with colon cancer      Aneurysm Paternal Uncle               Patient Active Problem List   Diagnosis     Family history of ischemic heart disease - father's side of family - with normal lipids     Family history of ovarian cancer - following with Dr. Stuart - ? BSO in late 40's      CARDIOVASCULAR SCREENING; LDL GOAL LESS THAN 160     Essential hypertension with goal blood pressure less than 140/90     Chest pain, unspecified     Irritable bowel syndrome with diarrhea       Current Outpatient Medications   Medication Sig Dispense Refill     candesartan (ATACAND) 8 MG tablet Take 1 tablet (8 mg) by mouth daily 30 tablet 0     Cholecalciferol (VITAMIN D) 2000 UNITS CAPS Take by mouth daily        Coenzyme Q10 (COQ10) 50 MG CAPS Take 70 mg by mouth daily       drospirenone-ethinyl estradiol (LAURA) 3-0.03 MG tablet Take 1 tablet by  "mouth daily 84 tablet 4     GLUCOSAMINE 500 MG OR CAPS 1 tab BID  0     MULTI-VITAMIN OR TABS 1 TABLET DAILY 30 0     OMEGA-3 CAPS   OR 2 caps Twice daily,  0     PROBIOTICA OR CHEW take one tablet daily  0     topiramate (TOPAMAX) 50 MG tablet Take 1 tablet (50 mg) by mouth daily 30 tablet 0          Allergies   Allergen Reactions     No Known Drug Allergies        Problem list and histories reviewed & adjusted, as indicated.  Additional history: as documented    Reviewed and updated as needed this visit by clinical staff       Reviewed and updated as needed this visit by Provider.          ROS:   ROS: 12 point ROS neg other than the symptoms noted above.     OBJECTIVE:                                                    /80   Pulse 95   Temp 98.2  F (36.8  C) (Oral)   Ht 1.626 m (5' 4\")   Wt 54.9 kg (121 lb)   SpO2 100%   BMI 20.77 kg/m    Body mass index is 20.77 kg/m .   GENERAL: healthy, alert, well nourished, well hydrated, no distress  HENT: ear canals- normal; TMs- normal; Nose- normal; Mouth- no ulcers, no lesions  NECK: no tenderness, no adenopathy, no asymmetry, no masses, no stiffness; thyroid- normal to palpation  RESP: lungs clear to auscultation - no rales, no rhonchi, no wheezes  CV: regular rates and rhythm, normal S1 S2, no S3 or S4 and no murmur, no click or rub -  ABDOMEN: soft, no tenderness, no  hepatosplenomegaly, no masses, normal bowel sounds  MS: extremities- no gross deformities noted, no edema  Alert and oriented. No acute distress. Appears well-groomed and casually dressed. Affect is normal, not particularly depressed. In good humor and laughs appropriately. Not particularly anxious. No evidence of psychosis.     Diagnostic test results:  Diagnostic Test Results:  See [facility] orders.      ASSESSMENT/PLAN:                                                        ICD-10-CM    1. Essential hypertension with goal blood pressure less than 140/90 I10 Albumin Random Urine Quantitative " with Creat Ratio     candesartan (ATACAND) 8 MG tablet   2. Screening for HIV (human immunodeficiency virus) Z11.4    3. Need for prophylactic vaccination and inoculation against influenza Z23    4. Need for shingles vaccine Z23    5. Postcoital bleeding N93.0 drospirenone-ethinyl estradiol (LAURA) 3-0.03 MG tablet   6. Episodic tension-type headache, not intractable G44.219 topiramate (TOPAMAX) 50 MG tablet   7. Visit for screening mammogram Z12.31 MA Screen Bilateral w/Mina   8. Empty nest syndrome Z63.8 MENTAL HEALTH REFERRAL  - Adult; Outpatient Treatment; Individual/Couples/Family/Group Therapy/Health Psychology; Other: [facility] [phone]; We will contact you to schedule the appointment or please call with any questi...   9. Early satiety R68.81 CT Abdomen Pelvis w Contrast     GASTROENTEROLOGY ADULT REF PROCEDURE ONLY Liv Zuniga [phone]; Dr. Castro (if Dr. PEDERSON not available, then [facility] )     CBC with platelets differential     Comprehensive metabolic panel     TSH with free T4 reflex   10. Unintentional weight loss R63.4 CT Abdomen Pelvis w Contrast     GASTROENTEROLOGY ADULT REF PROCEDURE ONLY Liv Zuniga [phone]; Dr. Castro (if Dr. PEDERSON not available, then [place] )     CBC with platelets differential     Comprehensive metabolic panel     TSH with free T4 reflex     Please, call or return to clinic or go to the ER immediately if signs or symptoms worsen or fail to improve as anticipated.   See Patient Instructions    ? Weight loss Interaction from topiramate with Yuni.  Await above labs.     Return in about 1 month (around 5/25/2019) for if any symptoms not improving or worsening .          Patti Purcell MD    [facility]

## 2019-04-25 NOTE — PATIENT INSTRUCTIONS
Please, call or return to clinic or go to the ER immediately if signs or symptoms worsen or fail to improve as anticipated.                Thank you for choosing [facility]  for your Health Care. It was a pleasure seeing you at your visit today. Please contact us with any questions or concerns you may have.                   Patti Purcell MD                                  To reach your [facility] care team after hours call:   [phone]    Our clinic hours are:     Monday- 7:30 am - 7:00 pm                             Tuesday through Friday- 7:30 am - 5:00 pm                                        Saturday- 8:00 am - 12:00 pm                  Phone:  [phone]    Our pharmacy hours are:     Monday  8:00 am to 7:00 pm      Tuesday through Friday 8:00am to 6:00pm                        Saturday - 9:00 am to 1:00 pm      Sunday : Closed.              Phone:  [phone]      There is also information available at our web site:  [email or web address]    If your provider ordered any lab tests and you do not receive the results within 10 business days, please call the clinic.    If you need a medication refill please contact your pharmacy.  Please allow 2 business days for your refill to be completed.    Our clinic offers telephone visits and e visits.  Please ask one of your team members to explain more.      Use [employer] (secure email communication and access to your chart) to send your primary care provider a message or make an appointment. Ask someone on your Team how to sign up for [employer].

## 2019-04-26 LAB
ALBUMIN SERPL-MCNC: 3.7 G/DL (ref 3.4–5)
ALP SERPL-CCNC: 54 U/L (ref 40–150)
ALT SERPL W P-5'-P-CCNC: 29 U/L (ref 0–50)
ANION GAP SERPL CALCULATED.3IONS-SCNC: 6 MMOL/L (ref 3–14)
AST SERPL W P-5'-P-CCNC: 23 U/L (ref 0–45)
BILIRUB SERPL-MCNC: 0.6 MG/DL (ref 0.2–1.3)
BUN SERPL-MCNC: 17 MG/DL (ref 7–30)
CALCIUM SERPL-MCNC: 9.5 MG/DL (ref 8.5–10.1)
CHLORIDE SERPL-SCNC: 108 MMOL/L (ref 94–109)
CO2 SERPL-SCNC: 26 MMOL/L (ref 20–32)
CREAT SERPL-MCNC: 0.92 MG/DL (ref 0.52–1.04)
CREAT UR-MCNC: 106 MG/DL
GFR SERPL CREATININE-BSD FRML MDRD: 72 ML/MIN/{1.73_M2}
GLUCOSE SERPL-MCNC: 76 MG/DL (ref 70–99)
MICROALBUMIN UR-MCNC: 5 MG/L
MICROALBUMIN/CREAT UR: 4.88 MG/G CR (ref 0–25)
POTASSIUM SERPL-SCNC: 3.9 MMOL/L (ref 3.4–5.3)
PROT SERPL-MCNC: 7.6 G/DL (ref 6.8–8.8)
SODIUM SERPL-SCNC: 140 MMOL/L (ref 133–144)
TSH SERPL DL<=0.005 MIU/L-ACNC: 1.37 MU/L (ref 0.4–4)

## 2019-04-30 ENCOUNTER — HOSPITAL ENCOUNTER (OUTPATIENT)
Dept: MAMMOGRAPHY | Facility: CLINIC | Age: 49
Discharge: HOME | End: 2019-04-30
Attending: FAMILY MEDICINE | Admitting: FAMILY MEDICINE
Payer: COMMERCIAL

## 2019-04-30 DIAGNOSIS — Z12.31 VISIT FOR SCREENING MAMMOGRAM: ICD-10-CM

## 2019-04-30 PROCEDURE — 77063 BREAST TOMOSYNTHESIS BI: CPT

## 2019-05-02 ENCOUNTER — HOSPITAL ENCOUNTER (OUTPATIENT)
Facility: CLINIC | Age: 49
Discharge: HOME | End: 2019-05-02
Attending: INTERNAL MEDICINE | Admitting: INTERNAL MEDICINE
Payer: COMMERCIAL

## 2019-05-02 VITALS
DIASTOLIC BLOOD PRESSURE: 73 MMHG | RESPIRATION RATE: 16 BRPM | OXYGEN SATURATION: 99 % | HEART RATE: 55 BPM | SYSTOLIC BLOOD PRESSURE: 99 MMHG

## 2019-05-02 LAB — UPPER GI ENDOSCOPY: NORMAL

## 2019-05-02 PROCEDURE — 88305 TISSUE EXAM BY PATHOLOGIST: CPT | Performed by: INTERNAL MEDICINE

## 2019-05-02 PROCEDURE — 43239 EGD BIOPSY SINGLE/MULTIPLE: CPT | Performed by: INTERNAL MEDICINE

## 2019-05-02 PROCEDURE — 25000125 ZZHC RX 250: Performed by: INTERNAL MEDICINE

## 2019-05-02 PROCEDURE — 40000104 ZZH STATISTIC MODERATE SEDATION < 10 MIN: Performed by: INTERNAL MEDICINE

## 2019-05-02 PROCEDURE — 25000128 H RX IP 250 OP 636: Performed by: INTERNAL MEDICINE

## 2019-05-02 PROCEDURE — 88305 TISSUE EXAM BY PATHOLOGIST: CPT | Mod: 26 | Performed by: INTERNAL MEDICINE

## 2019-05-02 RX ORDER — NALOXONE HYDROCHLORIDE 0.4 MG/ML
.1-.4 INJECTION, SOLUTION INTRAMUSCULAR; INTRAVENOUS; SUBCUTANEOUS
Status: CANCELLED | OUTPATIENT
Start: 2019-05-02 | End: 2019-05-03

## 2019-05-02 RX ORDER — LIDOCAINE 40 MG/G
CREAM TOPICAL
Status: DISCONTINUED | OUTPATIENT
Start: 2019-05-02 | End: 2019-05-02 | Stop reason: HOSPADM

## 2019-05-02 RX ORDER — ONDANSETRON 2 MG/ML
4 INJECTION INTRAMUSCULAR; INTRAVENOUS
Status: DISCONTINUED | OUTPATIENT
Start: 2019-05-02 | End: 2019-05-02 | Stop reason: HOSPADM

## 2019-05-02 RX ORDER — ONDANSETRON 4 MG/1
4 TABLET, ORALLY DISINTEGRATING ORAL EVERY 6 HOURS PRN
Status: CANCELLED | OUTPATIENT
Start: 2019-05-02

## 2019-05-02 RX ORDER — ONDANSETRON 2 MG/ML
4 INJECTION INTRAMUSCULAR; INTRAVENOUS EVERY 6 HOURS PRN
Status: CANCELLED | OUTPATIENT
Start: 2019-05-02

## 2019-05-02 RX ORDER — FLUMAZENIL 0.1 MG/ML
0.2 INJECTION, SOLUTION INTRAVENOUS
Status: CANCELLED | OUTPATIENT
Start: 2019-05-02 | End: 2019-05-02

## 2019-05-02 RX ORDER — FENTANYL CITRATE 50 UG/ML
INJECTION, SOLUTION INTRAMUSCULAR; INTRAVENOUS PRN
Status: DISCONTINUED | OUTPATIENT
Start: 2019-05-02 | End: 2019-05-02 | Stop reason: HOSPADM

## 2019-05-02 NOTE — DISCHARGE INSTRUCTIONS
Celiac Disease   Celiac disease is caused by a sensitivity or allergy to gluten. This is a protein found in many grains such as wheat, barley, and rye. Celiac disease affects villi (tiny, fingerlike stalks) in the small bowel (intestine). Normally, the villi make it possible for the small bowel to absorb nutrients from the food you eat. But celiac disease damages the villi. As a result, you can t absorb the nutrients you need, even if you eat plenty of food. Celiac disease is an autoimmune disease. You can manage the disease by removing gluten from your diet. This relieves your symptoms. It also reverses the damage to your small bowel. Celiac disease is sometimes called celiac sprue.     Causes of Celiac Disease     Celiac disease may have a genetic component that has a trigger. This means it can be passed down in families. If your doctor thinks that you have celiac disease, he or she may advise that other members of your family be checked for it as well.     Signs and Symptoms of Celiac Disease   The symptoms of celiac disease can vary for each person. Some people have no symptoms at all. If symptoms do occur, they can include:     Diarrhea, constipation, or both     Light colored, foul-smelling or fatty stool     Abdominal pain and cramping     Abdominal swelling or bloating     Weight loss     Bone or joint pain     Iron deficiency     Headaches     Tiredness and loss of energy     Mood changes, irritability, and depression     Canker sores     Skin rash     Tooth enamel problems              Diagnosing Celiac Disease   Your doctor will ask about your symptoms and health history. You ll also have a physical exam. Tests are then done to confirm the problem. These can include:     Blood tests. These help check for specific proteins in the blood that are present with celiac disease. They also check for anemia and help rule out other problems. The tests are done by taking a blood sample.     Upper endoscopy with  biopsy. This is done to see inside the stomach and duodenum (first part of the small bowel). For the test, an endoscope is used. This is a thin, flexible tube with a tiny camera on the end. It s inserted through the mouth and down into the stomach and duodenum. Tools are passed through the endoscope to remove tiny tissue samples (biopsy). The tissue samples are taken to a lab and looked at under a microscope. This is to check the tiny villi for damage. This test must be done while you are still eating food with gluten. This is the only way to see whether the presence of gluten is damaging the villi.     Genetic tests. These check for problems with specific genes linked to celiac disease. They are done by taking blood samples.  Treating Celiac Disease   To treat celiac disease, you must remove all sources of gluten from your diet. This will allow the villi to heal, so that nutrients can be absorbed normally. It s important to follow a strict, gluten-free diet daily, even if you don t have symptoms. If you don t do this, the small bowel can become permanently damaged, which can lead to serious health problems. These include bone disease, cancer of the small bowel, and various nervous system disorders.   Sources of Gluten   Gluten is found in wheat, barley, and rye. The most common foods with gluten are those made with wheat flour. These include bread, pasta, cake, and cereal. Gluten is also often found in beer, gravies, salad dressings, and most packaged foods. It is even found in some non-food products such as certain medications and cosmetics. Your doctor can refer you to a dietitian to  you about what you should avoid. The resources below will also give you lists of food and products that contain gluten.   Follow-Up   You ll meet with your doctor periodically to monitor your health. During these visits, routine blood tests are often done to make sure your condition is under control. Your doctor can also  refer you to other health care providers or support and advocacy groups to help you cope with your condition.     Learning More About Celiac Disease  The following resources can help you learn more about celiac disease and how to manage it:    Celiac Disease Foundation: www.celiac.org    Celiac Sprue Association: www.csaceliacs.org    Gluten Intolerance Group: www.gluten.net    National [place] of Diabetes and Digestive and Kidney Diseases: www.niddk.nih.gov      4205-3777 [employer]. [address] [place] [address], [place] [address]. All rights reserved. This information is not intended as a substitute for professional medical care. Always follow your healthcare professional's instructions.     Understanding H. pylori and Ulcers  Traditionally, ulcers, or sores in the lining of your digestive tract, were thought to be caused by too much spicy food, stress, or an anxious personality. We now know that most ulcers are probably due to infection with bacteria known as Helicobacter pylori (H. pylori).     H. pylori invade and disturb the lining of the digestive tract. Acid may weaken the area, causing an ulcer.      Common Ulcer Symptoms  Burning, cramping, or hunger-like pain in the stomach area, often one to three hours after a meal or in the middle of the night  Pain that gets better or worse with eating  Nausea or vomiting  Black, tarry, or bloody stools (which means the ulcer is bleeding)  Or you may have no symptoms.     An ulcer can form in two areas of the digestive tract; the stomach and the duodenum (where the stomach meets the small intestine).      Your Evaluation  An evaluation by your doctor can show if you have an ulcer and determine whether it was caused by H. pylori. Your doctor may ask you questions, examine you, and possibly do some tests. These may include:  A special X-ray called a barium upper gastrointestinal series, to help locate an ulcer. During the test, you drink a chalky liquid. This  liquid helps the ulcer show up on the X-ray.  An endoscopic exam, done with a long tube passed through your mouth into your stomach, to give the doctor a closer look at your ulcer. You will be lightly sedated for this procedure. Your doctor can also take a tissue sample to test for H. pylori.  Blood, stool, and breath tests are also available to show whether you have H. pylori in your digestive tract.  Your Treatment  To kill H. pylori so your ulcer can heal, your doctor will probably prescribe antibiotics. Other ulcer medications that help reduce stomach acid may also be prescribed as well. Testing after treatment is recommended to be sure the H. pylori infection is gone. Usually, killing H. pylori helps keep the ulcer from returning.    0760-6444 [facility], [address] [place] [address], [place] [address]. All rights reserved. This information is not intended as a substitute for professional medical care. Always follow your healthcare professional's instructions.

## 2019-05-02 NOTE — H&P
Pre-Endoscopy History and Physical     Evita Bey MRN# [id number]   YOB: 1970 Age: 49 year old     Date of Procedure: 2019  Primary care provider: Patti Purcell  Type of Endoscopy: Gastroscopy with possible biopsy, possible dilation  Reason for Procedure: weight loss  Type of Anesthesia Anticipated: Conscious Sedation    HPI:    Evita is a 49 year old female who will be undergoing the above procedure.      A history and physical has been performed. The patient's medications and allergies have been reviewed. The risks and benefits of the procedure and the sedation options and risks were discussed with the patient.  All questions were answered and informed consent was obtained.      She denies a personal or family history of anesthesia complications or bleeding disorders.     Patient Active Problem List   Diagnosis     Family history of ischemic heart disease - father's side of family - with normal lipids     Family history of ovarian cancer - following with Dr. Stuart - ? BSO in late       CARDIOVASCULAR SCREENING; LDL GOAL LESS THAN 160     Essential hypertension with goal blood pressure less than 140/90     Chest pain, unspecified     Irritable bowel syndrome with diarrhea        Past Medical History:   Diagnosis Date     Calculus of kidney 1996    Calcium stone - etiology after w/u was excess calcium intake     Fam hx-cardiovas dis NEC     father with MI at age 60 yo, s/p PTCA     Family history of ovarian cancer - following with Dr. Stuart - ? 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 Castro [facility]     COLONOSCOPY N/A 10/12/2018    Procedure: COLONOSCOPY;   COLONOSCOPY [fv]  ;  Surgeon: Alexander Castro MD;  Location:  GI       Social History     Tobacco Use     Smoking status: Never Smoker     Smokeless tobacco: Never Used   Substance Use Topics     Alcohol use: Yes     Alcohol/week: 0.0 oz     Comment: 0-1 wine cooler a week       Family History   Problem Relation Age of Onset     Hypertension Mother      Osteoporosis Mother         Osteopenia     Bladder Cancer Mother      Gallbladder Disease Mother         Gallbladder removed- with some dysplasia noted     C.A.D. Father         MI at age 59 and stented -      Hypertension Father      Gastrointestinal Disease Father         gallstones and gallstone pancreatitis at age 61     Lipids Father         low HDL     Genitourinary Problems Father         uric acid kidney stone     C.A.D. Paternal Grandfather         bypass in 70's     Depression Paternal Grandfather         in old age     Respiratory Paternal Grandfather         sleep apnea     Hypertension Maternal Grandfather      Alzheimer Disease Maternal Grandfather         in late 70's     Gastrointestinal Disease Paternal Grandmother          of gallstone pancreatitis at age 50     Cancer Maternal Grandmother          of ovarian cancer at age 50     Coronary Artery Disease Early Onset Other         father's side of family - late 50's with normal LDLs and BP's      Aneurysm Paternal Half-Sister      Colon Cancer Maternal Uncle         3 maternal great-uncles with colon cancer      Aneurysm Paternal Uncle        Prior to Admission medications    Medication Sig Start Date End Date Taking? Authorizing Provider   candesartan (ATACAND) 8 MG tablet Take 1 tablet (8 mg) by mouth daily 19  Yes Patti Purcell MD   Cholecalciferol (VITAMIN D) 2000 UNITS CAPS Take by mouth daily    Yes Reported, Patient   Coenzyme Q10 (COQ10) 50 MG CAPS Take 70 mg by mouth daily   Yes Reported, Patient   drospirenone-ethinyl estradiol (LAURA) 3-0.03 MG tablet Take 1  "tablet by mouth daily 4/25/19  Yes Patti Purcell MD   GLUCOSAMINE 500 MG OR CAPS 1 tab BID 7/3/07  Yes Job Stuart MD   MULTI-VITAMIN OR TABS 1 TABLET DAILY 7/3/07  Yes Job Stuart MD   OMEGA-3 CAPS   OR 2 caps Twice daily, 7/3/07  Yes Job Stuart MD   PROBIOTICA OR CHEW take one tablet daily 7/3/07  Yes Job Stuart MD   topiramate (TOPAMAX) 50 MG tablet Take 1 tablet (50 mg) by mouth daily 4/25/19  Yes Patti Purcell MD       Allergies   Allergen Reactions     No Known Drug Allergies         REVIEW OF SYSTEMS:   5 point ROS negative except as noted above in HPI, including Gen., Resp., CV, GI &  system review.    PHYSICAL EXAM:   There were no vitals taken for this visit. Estimated body mass index is 20.77 kg/m  as calculated from the following:    Height as of 4/25/19: 1.626 m (5' 4\").    Weight as of 4/25/19: 54.9 kg (121 lb).   GENERAL APPEARANCE: alert, and oriented  MENTAL STATUS: alert  AIRWAY EXAM: Mallampatti Class I (visualization of the soft palate, fauces, uvula, anterior and posterior pillars)  RESP: lungs clear to auscultation - no rales, rhonchi or wheezes  CV: regular rates and rhythm  DIAGNOSTICS:    Not indicated    IMPRESSION   ASA Class 2 - Mild systemic disease    PLAN:   Plan for Gastroscopy with possible biopsy, possible dilation. We discussed the risks, benefits and alternatives and the patient wished to proceed.    The above has been forwarded to the consulting provider.      Signed Electronically by: Alexander Castro  May 2, 2019          "

## 2019-05-03 LAB — COPATH REPORT: NORMAL

## 2019-05-09 ENCOUNTER — HOSPITAL ENCOUNTER (OUTPATIENT)
Dept: CT IMAGING | Facility: CLINIC | Age: 49
Discharge: HOME | End: 2019-05-09
Attending: FAMILY MEDICINE | Admitting: FAMILY MEDICINE
Payer: COMMERCIAL

## 2019-05-09 DIAGNOSIS — R63.4 UNINTENTIONAL WEIGHT LOSS: ICD-10-CM

## 2019-05-09 DIAGNOSIS — R68.81 EARLY SATIETY: ICD-10-CM

## 2019-05-09 PROCEDURE — 25000128 H RX IP 250 OP 636: Performed by: FAMILY MEDICINE

## 2019-05-09 PROCEDURE — 74177 CT ABD & PELVIS W/CONTRAST: CPT

## 2019-05-09 RX ORDER — IOPAMIDOL 755 MG/ML
500 INJECTION, SOLUTION INTRAVASCULAR ONCE
Status: COMPLETED | OUTPATIENT
Start: 2019-05-09 | End: 2019-05-09

## 2019-05-09 RX ADMIN — SODIUM CHLORIDE 55 ML: 9 INJECTION, SOLUTION INTRAVENOUS at 08:07

## 2019-05-09 RX ADMIN — IOPAMIDOL 61 ML: 755 INJECTION, SOLUTION INTRAVENOUS at 08:07

## 2019-08-30 ENCOUNTER — TELEPHONE (OUTPATIENT)
Dept: FAMILY MEDICINE | Facility: CLINIC | Age: 49
End: 2019-08-30

## 2019-08-30 NOTE — TELEPHONE ENCOUNTER
Reason for Call:  Same Day Appointment, Requested Provider:  Patti Purcell MD    PCP: Patti Purcell    Reason for visit: Pre op for Mini tummy tuck @ [facility] on 11/18/2019 with Dr Carolina Kitchen    Duration of symptoms: ongoing     Have you been treated for this in the past? No    Additional comments: Please work her in on October 31st.     Can we leave a detailed message on this number? YES    Phone number patient can be reached at: Cell number on file:    Telephone Information:   Mobile [phone]       Best Time: any      Call taken on 8/30/2019 at 2:11 PM by Joann Bro

## 2019-10-29 ENCOUNTER — OFFICE VISIT (OUTPATIENT)
Dept: OPTOMETRY | Facility: CLINIC | Age: 49
End: 2019-10-29
Payer: COMMERCIAL

## 2019-10-29 DIAGNOSIS — H52.31 ANISOMETROPIA: ICD-10-CM

## 2019-10-29 DIAGNOSIS — H52.4 PRESBYOPIA: ICD-10-CM

## 2019-10-29 DIAGNOSIS — H52.03 HYPEROPIA OF BOTH EYES: Primary | ICD-10-CM

## 2019-10-29 PROCEDURE — 92015 DETERMINE REFRACTIVE STATE: CPT | Performed by: OPTOMETRIST

## 2019-10-29 PROCEDURE — 92004 COMPRE OPH EXAM NEW PT 1/>: CPT | Performed by: OPTOMETRIST

## 2019-10-29 ASSESSMENT — REFRACTION_MANIFEST
OD_AXIS: 107
OD_SPHERE: +4.75
OD_ADD: +2.50
OS_AXIS: 077
OS_CYLINDER: +0.50
OS_ADD: +2.50
OS_SPHERE: +0.25
OD_CYLINDER: +0.50

## 2019-10-29 ASSESSMENT — REFRACTION_CURRENTRX
OD_CYLINDER: -0.75
OD_SPHERE: +5.50
OD_AXIS: 010
OD_SPHERE: +5.50
OS_SPHERE: +0.50
OD_AXIS: 010
OD_CYLINDER: -0.75
OD_SPHERE: +5.50
OS_SPHERE: +0.50

## 2019-10-29 ASSESSMENT — VISUAL ACUITY
OD_CC: 20/25-2
METHOD: SNELLEN - LINEAR
CORRECTION_TYPE: CONTACTS
OS_SC: 20/20
OS_SC+: -2
OD_CC: 20/80
OS_SC: 20/60-1

## 2019-10-29 ASSESSMENT — SLIT LAMP EXAM - LIDS
COMMENTS: NORMAL
COMMENTS: NORMAL

## 2019-10-29 ASSESSMENT — EXTERNAL EXAM - LEFT EYE: OS_EXAM: NORMAL

## 2019-10-29 ASSESSMENT — CONF VISUAL FIELD
METHOD: COUNTING FINGERS
OD_NORMAL: 1
OS_NORMAL: 1

## 2019-10-29 ASSESSMENT — TONOMETRY
OS_IOP_MMHG: 14
IOP_METHOD: APPLANATION
OD_IOP_MMHG: 14

## 2019-10-29 ASSESSMENT — CUP TO DISC RATIO
OD_RATIO: 0.4
OS_RATIO: 0.5

## 2019-10-29 ASSESSMENT — EXTERNAL EXAM - RIGHT EYE: OD_EXAM: NORMAL

## 2019-10-29 NOTE — LETTER
10/29/2019         RE: Evita Bey  [address]  [place] [address]        Dear Colleague,    Thank you for referring your patient, Evita Bey, to the [facility]. Please see a copy of my visit note below.    Chief Complaint   Patient presents with     Annual Eye Exam     Contact Lens Evaluation     AMMON: 2018 at [employer]. NVA is worsening, using readers. She is concerned in photos it looks like her eyes are crossing. She had a history of her eyes crossing in second grade. Wears cls right eye only with readers over it.   2 children with anisometropic amblyopia  Evita has not had any strab surgery     Previous contact lens wearer? Yes: Biofinity Toric Od only   Comfort of contact lenses :Comfortable, would like to go to a daily  Satisfied with current lenses: No - feels like they're foggy/blurry/smudged        Last Eye Exam: 2018  Dilated Previously: Yes    What are you currently using to see?  glasses, readers and contacts    Distance Vision Acuity: Satisfied with vision    Near Vision Acuity: Not satisfied     Eye Comfort: good  Do you use eye drops? 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       OBJECTIVE: See Ophthalmology exam    ASSESSMENT:    ICD-10-CM    1. Hyperopia of both eyes H52.03    2. Presbyopia H52.4    3. Anisometropia H52.31    without amblyopia / monofixator   No evidence of esotropia or pronounced phoria  Subjective improvement with OS corrected    PLAN:   optional add update  Will compare both eyes corrected with daily lenses as monthlies are fogging up after a few days, versus astigmatism correction in R eye to spherical    Dispensed total one dailies will compare R correction versus OS only  Order 1day av oasys for astigmatism trials for R and spherical for OS and a dispense appointment when they arrive    Malorie Hinkle OD     Again, thank you  for allowing me to participate in the care of your patient.        Sincerely,        Malorie Hinkle OD

## 2019-10-29 NOTE — PATIENT INSTRUCTIONS
I will order a daily toric for R eye to compare  And more for left eye   Will compare both eyes corrected versus daily lenses, versus astigmatism correction in R eye

## 2019-10-29 NOTE — PROGRESS NOTES
Chief Complaint   Patient presents with     Annual Eye Exam     Contact Lens Evaluation     AMMON: 2018 at [employer]. NVA is worsening, using readers. She is concerned in photos it looks like her eyes are crossing. She had a history of her eyes crossing in second grade. Wears cls right eye only with readers over it.   2 children with anisometropic amblyopia  Evita has not had any strab surgery     Previous contact lens wearer? Yes: Biofinity Toric Od only   Comfort of contact lenses :Comfortable, would like to go to a daily  Satisfied with current lenses: No - feels like they're foggy/blurry/smudged        Last Eye Exam: 2018  Dilated Previously: Yes    What are you currently using to see?  glasses, readers and contacts    Distance Vision Acuity: Satisfied with vision    Near Vision Acuity: Not satisfied     Eye Comfort: good  Do you use eye drops? 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       OBJECTIVE: See Ophthalmology exam    ASSESSMENT:    ICD-10-CM    1. Hyperopia of both eyes H52.03    2. Presbyopia H52.4    3. Anisometropia H52.31    without amblyopia / monofixator   No evidence of esotropia or pronounced phoria  Subjective improvement with OS corrected    PLAN:   optional add update  Will compare both eyes corrected with daily lenses as monthly replacement lenses are fogging up after a few days    Dispensed total one dailies in left eye to compare to having only R correction   Order my day for astigmatism, ( 1day av oasys for astigmatism trials for R not available)  spherical for OS were dispensed   dispense appointment when R lenses arrive    Malorie Hinkle OD

## 2019-10-30 ENCOUNTER — TELEPHONE (OUTPATIENT)
Dept: OPTOMETRY | Facility: CLINIC | Age: 49
End: 2019-10-30

## 2019-10-30 ASSESSMENT — REFRACTION_CURRENTRX
OD_DIAMETER: 14.3
OS_BASECURVE: 8.5
OS_DIAMETER: 14.3
OS_BRAND: ALCON DAILIES TOTAL 1 BC 8.5, D 14.1

## 2019-10-30 NOTE — TELEPHONE ENCOUNTER
Order trials for dispense appointment     Brand Sphere Cylinder Axis Dist VA   Right J&J Acuvue 1-day Oasys for Astigmatism BC 8.6, D 14.5 +5.50 -0.75 010    Left J&J Acuvue 1-day Oasys for Astigmatism BC 8.6, D 14.5 +0.50        Trials ordered.  Brigitte Hayden on 10/30/2019 at 4:34 PM

## 2019-10-31 ENCOUNTER — OFFICE VISIT (OUTPATIENT)
Dept: FAMILY MEDICINE | Facility: CLINIC | Age: 49
End: 2019-10-31
Payer: COMMERCIAL

## 2019-10-31 VITALS
SYSTOLIC BLOOD PRESSURE: 110 MMHG | HEIGHT: 64 IN | BODY MASS INDEX: 21 KG/M2 | OXYGEN SATURATION: 100 % | HEART RATE: 90 BPM | TEMPERATURE: 98.1 F | DIASTOLIC BLOOD PRESSURE: 68 MMHG | WEIGHT: 123 LBS

## 2019-10-31 DIAGNOSIS — Z01.818 PREOP GENERAL PHYSICAL EXAM: Primary | ICD-10-CM

## 2019-10-31 DIAGNOSIS — I10 ESSENTIAL HYPERTENSION WITH GOAL BLOOD PRESSURE LESS THAN 140/90: ICD-10-CM

## 2019-10-31 DIAGNOSIS — L98.7 REDUNDANT SKIN OF ABDOMEN: ICD-10-CM

## 2019-10-31 LAB
ALBUMIN UR-MCNC: NEGATIVE MG/DL
APPEARANCE UR: CLEAR
BILIRUB UR QL STRIP: NEGATIVE
COLOR UR AUTO: YELLOW
ERYTHROCYTE [DISTWIDTH] IN BLOOD BY AUTOMATED COUNT: 11.8 % (ref 10–15)
GLUCOSE UR STRIP-MCNC: NEGATIVE MG/DL
HCG UR QL: NEGATIVE
HCT VFR BLD AUTO: 38.4 % (ref 35–47)
HGB BLD-MCNC: 13.2 G/DL (ref 11.7–15.7)
HGB UR QL STRIP: NEGATIVE
KETONES UR STRIP-MCNC: ABNORMAL MG/DL
LEUKOCYTE ESTERASE UR QL STRIP: NEGATIVE
MCH RBC QN AUTO: 31.4 PG (ref 26.5–33)
MCHC RBC AUTO-ENTMCNC: 34.4 G/DL (ref 31.5–36.5)
MCV RBC AUTO: 91 FL (ref 78–100)
NITRATE UR QL: NEGATIVE
PH UR STRIP: 5.5 PH (ref 5–7)
PLATELET # BLD AUTO: 231 10E9/L (ref 150–450)
RBC # BLD AUTO: 4.21 10E12/L (ref 3.8–5.2)
SOURCE: ABNORMAL
SP GR UR STRIP: >1.03 (ref 1–1.03)
UROBILINOGEN UR STRIP-ACNC: 0.2 EU/DL (ref 0.2–1)
WBC # BLD AUTO: 7.9 10E9/L (ref 4–11)

## 2019-10-31 PROCEDURE — 85027 COMPLETE CBC AUTOMATED: CPT | Performed by: FAMILY MEDICINE

## 2019-10-31 PROCEDURE — 99215 OFFICE O/P EST HI 40 MIN: CPT | Performed by: FAMILY MEDICINE

## 2019-10-31 PROCEDURE — 36415 COLL VENOUS BLD VENIPUNCTURE: CPT | Performed by: FAMILY MEDICINE

## 2019-10-31 PROCEDURE — 81003 URINALYSIS AUTO W/O SCOPE: CPT | Performed by: FAMILY MEDICINE

## 2019-10-31 PROCEDURE — 81025 URINE PREGNANCY TEST: CPT | Performed by: FAMILY MEDICINE

## 2019-10-31 PROCEDURE — 80053 COMPREHEN METABOLIC PANEL: CPT | Performed by: FAMILY MEDICINE

## 2019-10-31 PROCEDURE — 93000 ELECTROCARDIOGRAM COMPLETE: CPT | Performed by: FAMILY MEDICINE

## 2019-10-31 ASSESSMENT — MIFFLIN-ST. JEOR: SCORE: 1157.92

## 2019-10-31 NOTE — PROGRESS NOTES
[address] [address]  [phone]  Dept: [phone]    PRE-OP EVALUATION:  Today's date: 10/31/2019    Evita Bey (: 1970) presents for pre-operative evaluation assessment as requested by Dr. Kitchen.  She requires evaluation and anesthesia risk assessment prior to undergoing surgery/procedure for treatment of lipodystrophy .    Proposed Surgery/ Procedure: Mini Abdominoplasty  Date of Surgery/ Procedure: 19  Time of Surgery/ Procedure: [facility]  Hospital/Surgical Facility: [facility]  Fax number for surgical facility: [phone]  Primary Physician: Patti Purcell  Type of Anesthesia Anticipated: General    Patient has a Health Care Directive or Living Will:  NO    1. NO - Do you have a history of heart attack, stroke, stent, bypass or surgery on an artery in the head, neck, heart or legs?  2. NO - Do you ever have any pain or discomfort in your chest?  3. NO - Do you have a history of  Heart Failure?  4. NO - Are you troubled by shortness of breath when: walking on the level, up a slight hill or at night?  5. NO - Do you currently have a cold, bronchitis or other respiratory infection?  6. NO - Do you have a cough, shortness of breath or wheezing?  7. NO - Do you sometimes get pains in the calves of your legs when you walk?  8. NO - Do you or anyone in your family have previous history of blood clots?  9. NO - Do you or does anyone in your family have a serious bleeding problem such as prolonged bleeding following surgeries or cuts?  10. NO - Have you ever had problems with anemia or been told to take iron pills?  11. NO - Have you had any abnormal blood loss such as black, tarry or bloody stools, or abnormal vaginal bleeding?  12. NO - Have you ever had a blood transfusion?  13. YES - HAVE YOU OR ANY OF YOUR RELATIVES EVER HAD PROBLEMS WITH ANESTHESIA? Nausea and vomiting with epidural  14. NO - Do you have  sleep apnea, excessive snoring or daytime drowsiness?  15. NO - Do you have any prosthetic heart valves?  16. NO - Do you have prosthetic joints?  17. NO - Is there any chance that you may be pregnant?      HPI:     HPI related to upcoming procedure: Pt is undergoing a mini abdominoplasty to treat her lipodystrophy.      HYPERTENSION - Patient has longstanding history of HTN , currently denies any symptoms referable to elevated blood pressure. Specifically denies chest pain, palpitations, dyspnea, orthopnea, PND or peripheral edema. Blood pressure readings have been in normal range. Current medication regimen is as listed below. Patient denies any side effects of medication.   BP Readings from Last 3 Encounters:   10/31/19 110/68   05/02/19 99/73   04/25/19 128/80     HEADACHES - Patient has longstanding history of HA's and is currently treating her sxs with Topamax 50 mg daily as a preventative.     MEDICAL HISTORY:     Patient Active Problem List    Diagnosis Date Noted     Anisometropia 10/29/2019     Priority: Medium     Irritable bowel syndrome with diarrhea 03/01/2018     Priority: Medium     Chest pain, unspecified 02/24/2017     Priority: Medium     Essential hypertension with goal blood pressure less than 140/90 01/06/2017     Priority: Medium     CARDIOVASCULAR SCREENING; LDL GOAL LESS THAN 160 10/31/2010     Priority: Medium     Family history of ovarian cancer - following with Dr. Stuart - ? BSO in late 40's  11/21/2008     Priority: Medium     Family history of ischemic heart disease - father's side of family - with normal lipids      Priority: Medium     father with MI at age 58 yo, s/p PTCA  Problem list name updated by automated process. Provider to review and confirm  Problem list name updated by automated process. Provider to review        Past Medical History:   Diagnosis Date     Calculus of kidney 7/1996    Calcium stone - etiology after w/u was excess calcium intake     Fam hx-cardiovas dis  NEC     father with MI at age 58 yo, s/p PTCA     Family history of ovarian cancer - following with Dr. Stuart - ? BSO in late 40's       Fertility testing 94-    used lifetime total of 11 months of Clomid and 2 months of gonadotropins     Hypertension 2017     Volvulus (H) child    Resolved without surgery - hospitalized for 1 day     Past Surgical History:   Procedure Laterality Date     C  DELIVERY ONLY  94,96,99,02    4 C/S     COLONOSCOPY      Dr. Castro [facility]     COLONOSCOPY N/A 10/12/2018    Procedure: COLONOSCOPY;  COLONOSCOPY [fv]  ;  Surgeon: Alexander Castro MD;  Location:  GI     ESOPHAGOSCOPY, GASTROSCOPY, DUODENOSCOPY (EGD), COMBINED N/A 2019    Procedure: ESOPHAGOGASTRODUODENOSCOPY, WITH BIOPSY;  Surgeon: Alexander Castro MD;  Location:  GI     Current Outpatient Medications   Medication Sig Dispense Refill     candesartan (ATACAND) 8 MG tablet Take 1 tablet (8 mg) by mouth daily 90 tablet 3     Cholecalciferol (VITAMIN D) 2000 UNITS CAPS Take by mouth daily        Coenzyme Q10 (COQ10) 50 MG CAPS Take 70 mg by mouth daily       drospirenone-ethinyl estradiol (LAURA) 3-0.03 MG tablet Take 1 tablet by mouth daily 84 tablet 3     GLUCOSAMINE 500 MG OR CAPS 1 tab BID  0     MULTI-VITAMIN OR TABS 1 TABLET DAILY 30 0     OMEGA-3 CAPS   OR 2 caps Twice daily,  0     PROBIOTICA OR CHEW take one tablet daily  0     topiramate (TOPAMAX) 50 MG tablet Take 1 tablet (50 mg) by mouth daily 90 tablet 3     OTC products: Melatonin 1x per week    Allergies   Allergen Reactions     No Known Drug Allergies       Latex Allergy: NO    Social History     Tobacco Use     Smoking status: Never Smoker     Smokeless tobacco: Never Used   Substance Use Topics     Alcohol use: Yes     Alcohol/week: 0.0 standard drinks     Comment: 0-1 wine cooler a week     History   Drug Use No       REVIEW OF SYSTEMS:   CONSTITUTIONAL: NEGATIVE for fever, chills, change in weight  INTEGUMENTARY/SKIN:  "NEGATIVE for worrisome rashes, moles or lesions  EYES: NEGATIVE for vision changes or irritation  ENT/MOUTH: NEGATIVE for ear, mouth and throat problems  RESP: NEGATIVE for significant cough or SOB  BREAST: NEGATIVE for masses, tenderness or discharge  CV: NEGATIVE for chest pain, palpitations or peripheral edema  GI: NEGATIVE for nausea, abdominal pain, heartburn, or change in bowel habits  : NEGATIVE for frequency, dysuria, or hematuria  MUSCULOSKELETAL: NEGATIVE for significant arthralgias or myalgia  NEURO: NEGATIVE for weakness, dizziness or paresthesias  ENDOCRINE: NEGATIVE for temperature intolerance, skin/hair changes  HEME: NEGATIVE for bleeding problems  PSYCHIATRIC: NEGATIVE for changes in mood or affect    This document serves as a record of the services and decisions personally performed and made by Patti Purcell MD. It was created on her behalf by Amita Garcia, a trained medical scribe. The creation of this document is based on the provider's statements to the medical scribe.  Amita Garcia October 31, 2019 3:09 PM    EXAM:   /68 (BP Location: Right arm, Patient Position: Chair, Cuff Size: Adult Regular)   Pulse 90   Temp 98.1  F (36.7  C) (Oral)   Ht 1.626 m (5' 4\")   Wt 55.8 kg (123 lb)   LMP 10/10/2019 (Approximate)   SpO2 100%   Breastfeeding? No   BMI 21.11 kg/m      GENERAL APPEARANCE: healthy, alert and no distress     EYES: EOMI, PERRL     HENT: ear canals and TM's normal and nose and mouth without ulcers or lesions     NECK: no adenopathy, no asymmetry, masses, or scars and thyroid normal to palpation     RESP: lungs clear to auscultation - no rales, rhonchi or wheezes     CV: regular rates and rhythm, normal S1 S2, no S3 or S4 and no murmur, click or rub     ABDOMEN:  soft, nontender, no HSM or masses and bowel sounds normal     MS: extremities normal- no gross deformities noted, no evidence of inflammation in joints, FROM in all extremities.     SKIN: no " suspicious lesions or rashes     NEURO: Normal strength and tone, sensory exam grossly normal, mentation intact and speech normal     PSYCH: mentation appears normal. and affect normal/bright     LYMPHATICS: No cervical adenopathy    DIAGNOSTICS:   EKG: appears normal, NSR, normal axis, normal intervals, no acute ST/T changes c/w ischemia, no LVH by voltage criteria, unchanged from previous tracings 2017    Recent Labs   Lab Test 04/25/19  1231 03/01/18  1447   HGB 14.0 12.8    219    138   POTASSIUM 3.9 3.5   CR 0.92 0.92     IMPRESSION:   Reason for surgery/procedure: To treat redundant skin over abdomen  Diagnosis/reason for consult: Preoperative Clearance    ICD-10-CM    1. Preop general physical exam Z01.818 EKG 12-lead complete w/read - Clinics     CBC with platelets     Comprehensive metabolic panel   2. Redundant skin of abdomen L98.7    3. Essential hypertension with goal blood pressure less than 140/90- well controlled today  I10         The proposed surgical procedure is considered INTERMEDIATE risk.    REVISED CARDIAC RISK INDEX  The patient has the following serious cardiovascular risks for perioperative complications such as (MI, PE, VFib and 3  AV Block):  No serious cardiac risks  INTERPRETATION: 0 risks: Class I (very low risk - 0.4% complication rate)    The patient has the following additional risks for perioperative complications:  No identified additional risks      ICD-10-CM    1. Preop general physical exam Z01.818 EKG 12-lead complete w/read - Clinics     CBC with platelets     Comprehensive metabolic panel   2. Redundant skin of abdomen L98.7    3. Essential hypertension with goal blood pressure less than 140/90- well controlled today  I10        RECOMMENDATIONS:     --Patient is to take all scheduled medications on the day of surgery EXCEPT for modifications listed below.        - Hold candesartan the day before surgery        - Hold all vitamin and supplements 10 days before  procedure        - Hold NSAID's (ibuprofen, motrin, aleve, etc.) 10 days before procedure        - Tylenol ok     APPROVAL GIVEN to proceed with proposed procedure, without further diagnostic evaluation     The information in this document, created by the medical scribe for me, accurately reflects the services I personally performed and the decisions made by me. I have reviewed and approved this document for accuracy prior to leaving the patient care area.  October 31, 2019 3:09 PM           Signed Electronically by: Patti Purcell MD    Copy of this evaluation report is provided to requesting physician.    [facility] Preop Guidelines    Revised Cardiac Risk Index

## 2019-10-31 NOTE — PATIENT INSTRUCTIONS
Before Your Surgery      Call your surgeon if there is any change in your health. This includes signs of a cold or flu (such as a sore throat, runny nose, cough, rash or fever).    Do not smoke, drink alcohol or take over the counter medicine (unless your surgeon or primary care doctor tells you to) for the 24 hours before and after surgery.    If you take prescribed drugs: Follow your doctor s orders about which medicines to take and which to stop until after surgery.    Eating and drinking prior to surgery: follow the instructions from your surgeon    Take a shower or bath the night before surgery. Use the soap your surgeon gave you to gently clean your skin. If you do not have soap from your surgeon, use your regular soap. Do not shave or scrub the surgery site.  Wear clean pajamas and have clean sheets on your bed.                Thank you for choosing [facility]  for your Health Care. It was a pleasure seeing you at your visit today. Please contact us with any questions or concerns you may have.                   Patti Purcell MD                                  To reach your [facility] care team after hours call:   [phone]    Our clinic hours are:     Monday- 7:30 am - 7:00 pm                             Tuesday through Friday- 7:30 am - 5:00 pm                                        Saturday- 8:00 am - 12:00 pm                  Phone:  [phone]    Our pharmacy hours are:     Monday  8:00 am to 7:00 pm      Tuesday through Friday 8:00am to 6:00pm                        Saturday - 9:00 am to 1:00 pm      Sunday : Closed.              Phone:  [phone]      There is also information available at our web site:  [email or web address]    If your provider ordered any lab tests and you do not receive the results within 10 business days, please call the clinic.    If you need a medication refill please contact your pharmacy.  Please allow 2  business days for your refill to be completed.    Our clinic offers telephone visits and e visits.  Please ask one of your team members to explain more.      Use MyChart (secure email communication and access to your chart) to send your primary care provider a message or make an appointment. Ask someone on your Team how to sign up for [employer].

## 2019-11-01 LAB
ALBUMIN SERPL-MCNC: 3.6 G/DL (ref 3.4–5)
ALP SERPL-CCNC: 46 U/L (ref 40–150)
ALT SERPL W P-5'-P-CCNC: 25 U/L (ref 0–50)
ANION GAP SERPL CALCULATED.3IONS-SCNC: 9 MMOL/L (ref 3–14)
AST SERPL W P-5'-P-CCNC: 16 U/L (ref 0–45)
BILIRUB SERPL-MCNC: 0.3 MG/DL (ref 0.2–1.3)
BUN SERPL-MCNC: 23 MG/DL (ref 7–30)
CALCIUM SERPL-MCNC: 8.5 MG/DL (ref 8.5–10.1)
CHLORIDE SERPL-SCNC: 107 MMOL/L (ref 94–109)
CO2 SERPL-SCNC: 22 MMOL/L (ref 20–32)
CREAT SERPL-MCNC: 0.93 MG/DL (ref 0.52–1.04)
GFR SERPL CREATININE-BSD FRML MDRD: 71 ML/MIN/{1.73_M2}
GLUCOSE SERPL-MCNC: 95 MG/DL (ref 70–99)
POTASSIUM SERPL-SCNC: 3.6 MMOL/L (ref 3.4–5.3)
PROT SERPL-MCNC: 7 G/DL (ref 6.8–8.8)
SODIUM SERPL-SCNC: 138 MMOL/L (ref 133–144)

## 2019-11-07 NOTE — TELEPHONE ENCOUNTER
VILLA for astig not available in this power.   Ordered MyDay Toric instead, per Dr. Hinkle.    +5.50 -0.75 010    Trials ordered  Brigitte Hayden on 11/7/2019 at 9:15 AM

## 2019-11-14 ASSESSMENT — REFRACTION_CURRENTRX
OD_BASECURVE: 8.6
OD_BRAND: MY DAY TORIC
OD_BRAND: ALCON DAILIES TOTAL 1 BC 8.5, D 14.1
OD_BRAND: COOPER BIOFINITY TORIC BC 8.7, D 14.5

## 2019-11-14 ASSESSMENT — MIFFLIN-ST. JEOR: SCORE: 1147.92

## 2019-11-15 ENCOUNTER — OFFICE VISIT (OUTPATIENT)
Dept: OPTOMETRY | Facility: CLINIC | Age: 49
End: 2019-11-15
Payer: COMMERCIAL

## 2019-11-15 ENCOUNTER — ANESTHESIA - HEALTHEAST (OUTPATIENT)
Dept: SURGERY | Facility: AMBULATORY SURGERY CENTER | Age: 49
End: 2019-11-15

## 2019-11-15 DIAGNOSIS — H52.01 HYPEROPIA OF RIGHT EYE WITH ASTIGMATISM: Primary | ICD-10-CM

## 2019-11-15 DIAGNOSIS — H52.201 HYPEROPIA OF RIGHT EYE WITH ASTIGMATISM: Primary | ICD-10-CM

## 2019-11-15 PROCEDURE — 99207 ZZC NO CHARGE LOS: CPT | Performed by: OPTOMETRIST

## 2019-11-15 PROCEDURE — 99207 ZZC NO BILLABLE SERVICE THIS VISIT: CPT | Performed by: OPTOMETRIST

## 2019-11-15 ASSESSMENT — REFRACTION_CURRENTRX
OD_AXIS: 010
OD_SPHERE: +5.50
OD_BRAND: MY DAY TORIC
OD_BASECURVE: 8.6
OS_DIAMETER: 14.3
OD_CYLINDER: -0.75
OD_DIAMETER: 14.3
OS_SPHERE: +0.50
OS_BASECURVE: 8.5

## 2019-11-15 ASSESSMENT — VISUAL ACUITY
METHOD: SNELLEN - LINEAR
OD_CC+: -2
OD_CC: 20/25

## 2019-11-15 NOTE — LETTER
11/15/2019         RE: Evita Bey  [address]  [facility] [address]        Dear Colleague,    [facility] you [facility] your [facility]. Please see a copy of my visit note below.    Chief Complaint   Patient presents with     Contact Lens Follow Up   Pt is here today to try a lens in the right eye.      From previous visit Pt was given a lens for the left eye - she found it really difficult to insert and remove lens.  She is not sure if it is worth it.  Emilia Romero  [facility]            Medical, surgical and family histories reviewed and updated 11/15/2019.       OBJECTIVE: See Ophthalmology exam    ASSESSMENT:  Good comfort/ vision with daily lens, needs more power on ORX   PLAN:    May or may not order left lenses (+0.50)    Malorie Hinkle OD                   Again, thank you for allowing me to participate in the care of your patient.        Sincerely,        Malorie Hinkle, OD

## 2019-11-15 NOTE — PATIENT INSTRUCTIONS
Once your contact lens prescription is finalized and you are not having any problems with the trials or current lenses you can order your supply of lenses.   You can order your contact lenses online at [email or web address].  Click on services at the top of the page, then eye care and you will see the link to order contact lenses.  You can also order contact lenses at [phone]. There is no shipping fee if you order an annual supply otherwise  be sure to let them know which office you would like to  the lenses, [place] [phone].

## 2019-11-15 NOTE — PROGRESS NOTES
Chief Complaint   Patient presents with     Contact Lens Follow Up   Pt is here today to try a lens in the right eye.      From previous visit Pt was given a lens for the left eye - she found it really difficult to insert and remove lens.  She is not sure if it is worth it.  Emilia Romero  Opt[employer] Tech            Medical, surgical and family histories reviewed and updated 11/15/2019.       OBJECTIVE: See Ophthalmology exam    ASSESSMENT:  Good comfort/ vision with daily lens, needs more power on ORX   PLAN:    May or may not order left lenses (+0.50)    Malorie Hinkle OD

## 2019-11-18 ENCOUNTER — SURGERY - HEALTHEAST (OUTPATIENT)
Dept: SURGERY | Facility: AMBULATORY SURGERY CENTER | Age: 49
End: 2019-11-18

## 2019-11-18 ASSESSMENT — MIFFLIN-ST. JEOR: SCORE: 1139.7

## 2020-02-12 DIAGNOSIS — Z71.84 TRAVEL ADVICE ENCOUNTER: Primary | ICD-10-CM

## 2020-03-18 ENCOUNTER — TELEPHONE (OUTPATIENT)
Dept: FAMILY MEDICINE | Facility: CLINIC | Age: 50
End: 2020-03-18

## 2020-03-18 DIAGNOSIS — I10 ESSENTIAL HYPERTENSION WITH GOAL BLOOD PRESSURE LESS THAN 140/90: ICD-10-CM

## 2020-03-18 RX ORDER — AMLODIPINE BESYLATE 2.5 MG/1
2.5 TABLET ORAL DAILY
Qty: 30 TABLET | Refills: 3 | Status: SHIPPED | OUTPATIENT
Start: 2020-03-18 | End: 2020-07-02

## 2020-03-18 NOTE — TELEPHONE ENCOUNTER
With Covid 19 epidemic, there is a slightly increase risk for lung disease if you're on ACE inhibitors or ARB's.  Will switch to norvasc 2.5 mg daily.  Shelly Stahl MD  [facility]  [phone]

## 2020-03-22 ENCOUNTER — HEALTH MAINTENANCE LETTER (OUTPATIENT)
Age: 50
End: 2020-03-22

## 2020-05-06 ENCOUNTER — RESULTS ONLY (OUTPATIENT)
Dept: LAB | Age: 50
End: 2020-05-06

## 2020-05-06 ENCOUNTER — APPOINTMENT (OUTPATIENT)
Dept: LAB | Facility: CLINIC | Age: 50
End: 2020-05-06
Payer: COMMERCIAL

## 2020-05-07 LAB
COVID-19 SPIKE RBD ABY TITER: NORMAL
COVID-19 SPIKE RBD ABY: NEGATIVE

## 2020-05-11 DIAGNOSIS — I10 ESSENTIAL HYPERTENSION WITH GOAL BLOOD PRESSURE LESS THAN 140/90: Primary | ICD-10-CM

## 2020-05-12 RX ORDER — CANDESARTAN 8 MG/1
TABLET ORAL
Qty: 90 TABLET | Refills: 1 | Status: SHIPPED | OUTPATIENT
Start: 2020-05-12 | End: 2020-09-03

## 2020-05-12 NOTE — TELEPHONE ENCOUNTER
Routing refill request to provider for review/approval because:  Drug not active on patient's medication list    Erlinda Anaya RN, BSN  [facility]

## 2020-06-19 DIAGNOSIS — N93.0 POSTCOITAL BLEEDING: ICD-10-CM

## 2020-06-19 RX ORDER — DROSPIRENONE AND ETHINYL ESTRADIOL 0.03MG-3MG
KIT ORAL
Qty: 84 TABLET | Refills: 1 | Status: SHIPPED | OUTPATIENT
Start: 2020-06-19 | End: 2020-09-03

## 2020-06-19 NOTE — TELEPHONE ENCOUNTER
Routing refill request to provider for review/approval because:  Drug interaction warning    Erlinda Anaya RN, BSN  [facility]

## 2020-09-03 ENCOUNTER — OFFICE VISIT (OUTPATIENT)
Dept: OBGYN | Facility: CLINIC | Age: 50
End: 2020-09-03
Payer: COMMERCIAL

## 2020-09-03 VITALS
BODY MASS INDEX: 21.51 KG/M2 | WEIGHT: 126 LBS | SYSTOLIC BLOOD PRESSURE: 120 MMHG | HEART RATE: 88 BPM | HEIGHT: 64 IN | DIASTOLIC BLOOD PRESSURE: 78 MMHG

## 2020-09-03 DIAGNOSIS — Z78.0 MENOPAUSE: Primary | ICD-10-CM

## 2020-09-03 DIAGNOSIS — Z01.419 WOMEN'S ANNUAL ROUTINE GYNECOLOGICAL EXAMINATION: ICD-10-CM

## 2020-09-03 PROCEDURE — 99396 PREV VISIT EST AGE 40-64: CPT | Performed by: OBSTETRICS & GYNECOLOGY

## 2020-09-03 RX ORDER — ESTRADIOL 0.5 MG/1
0.5 TABLET ORAL DAILY
Qty: 90 TABLET | Refills: 3 | Status: SHIPPED | OUTPATIENT
Start: 2020-09-03 | End: 2022-02-10 | Stop reason: DRUGHIGH

## 2020-09-03 ASSESSMENT — MIFFLIN-ST. JEOR: SCORE: 1166.53

## 2020-09-03 NOTE — PROGRESS NOTES
SUBJECTIVE:                                                      Evita is a 50 year old  female who presents for annual exam.     Patient's last menstrual period was 2020.. Menses are regular q 28-30 days and light lasting a few days.  Using oral contraceptives for contraception.  She is not currently considering pregnancy.  Besides routine health maintenance,  she would like to discuss coming off oral contraceptive pills as she suspects she is menopausal. She would want to start low dose HRT as a replacement. She has a long history of menstrual migraines, has treated this with Topamax which works well, but is hoping that going to continuous HRT will obviate the need for this. She no longer has any symptoms of vaginal dryness or pain or bleeding with intercourse.  Her grandmother had ovarian cancer. Her mother had BRCA testing and was negative.  GYNECOLOGIC HISTORY:    Evita is sexually active with 1 male partner(s) and is currently in a monogamous relationship.      History sexually transmitted infections:No STD history    History of abnormal Pap smear: NO - age 30-65 PAP every 5 years with negative HPV co-testing recommended  Family history of breast CA: No  Family history of uterine/ovarian CA: Yes (Please explain): MGM    Family history of colon CA: No      HISTORY:  OB History    Para Term  AB Living   6 4 4 0 2 4   SAB TAB Ectopic Multiple Live Births   2 0 0 0 4      # Outcome Date GA Lbr Berry/2nd Weight Sex Delivery Anes PTL Lv   6 Term 02 37w0d 02:00 4.082 kg (9 lb) F CS SPINAL  ELSIE      Birth Comments: elective repeat C/S after SROM and spontaneous labor      Name: Demi      Apgar1: 8  Apgar5: 9   5 SAB 01 6w0d    SAB   DEC      Birth Comments: NO D&C   4 Term 99 39w0d  3.685 kg (8 lb 2 oz) M CS EPIDURAL  ELSIE      Birth Comments: elective repeat C/S      Name: Srinivasa      Apgar1: 9  Apgar5: 10   3 Term 96 38w0d 36:00 4.139 kg (9 lb 2 oz) M CS EPIDURAL  ELSIE       Birth Comments: failed  attempt      Name: Allan      Apgar1: 9  Apgar5: 10   2 Term 94 39w0d  3.827 kg (8 lb 7 oz) F CS EPIDURAL  ELSIE      Birth Comments: elective C/S for breech      Name: Sujey      Apgar1: 8  Apgar5: 9   1 SAB 93 14w0d    SAB   DEC      Birth Comments: NO D&C     Past Medical History:   Diagnosis Date     Calculus of kidney 1996    Calcium stone - etiology after w/u was excess calcium intake     Fam hx-cardiovas dis NEC     father with MI at age 60 yo, s/p PTCA     Family history of ovarian cancer - following with Dr. Stuart - ? BSO in late        Fertility testing -    used lifetime total of 11 months of Clomid and 2 months of gonadotropins     Hypertension      Volvulus (H) child    Resolved without surgery - hospitalized for 1 day     Past Surgical History:   Procedure Laterality Date     C  DELIVERY ONLY  94,96,99,02    4 C/S     COLONOSCOPY      Dr. Castro [facility]     COLONOSCOPY N/A 10/12/2018    Procedure: COLONOSCOPY;  COLONOSCOPY [fv]  ;  Surgeon: Alexander Castro MD;  Location:  GI     ESOPHAGOSCOPY, GASTROSCOPY, DUODENOSCOPY (EGD), COMBINED N/A 2019    Procedure: ESOPHAGOGASTRODUODENOSCOPY, WITH BIOPSY;  Surgeon: Alexander Castro MD;  Location:  GI     [facility]  2019     Family History   Problem Relation Age of Onset     Hypertension Mother      Osteoporosis Mother         Osteopenia     Bladder Cancer Mother      Gallbladder Disease Mother         Gallbladder removed- with some dysplasia noted     C.A.D. Father         MI at age 59 and stented -      Hypertension Father      Gastrointestinal Disease Father         gallstones and gallstone pancreatitis at age 61     Lipids Father         low HDL     Genitourinary Problems Father         uric acid kidney stone     Dementia Father         possible early dementia     C.A.D. Paternal Grandfather         bypass in 70's     Depression Paternal Grandfather          in old age     Respiratory Paternal Grandfather         sleep apnea     Hypertension Maternal Grandfather      Alzheimer Disease Maternal Grandfather         in late 70's     Gastrointestinal Disease Paternal Grandmother          of gallstone pancreatitis at age 50     Cancer Maternal Grandmother          of ovarian cancer at age 50     Coronary Artery Disease Early Onset Other         father's side of family - late 50's with normal LDLs and BP's      Aneurysm Paternal Half-Sister      Colon Cancer Maternal Uncle         3 maternal great-uncles with colon cancer      Aneurysm Paternal Uncle      Social History     Socioeconomic History     Marital status:      Spouse name: Juan     Number of children: 4     Years of education: 20     Highest education level: None   Occupational History     Occupation: physician     Employer: [employer]     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 [place]    Social Needs     Financial resource strain: None     Food insecurity     Worry: None     Inability: None     Transportation needs     Medical: None     Non-medical: None   Tobacco Use     Smoking status: Never Smoker     Smokeless tobacco: Never Used   Substance and Sexual Activity     Alcohol use: Yes     Alcohol/week: 0.0 standard drinks     Comment: 0-1 wine cooler a week     Drug use: No     Sexual activity: Yes     Partners: Male     Birth control/protection: Pill   Lifestyle     Physical activity     Days per week: None     Minutes per session: None     Stress: None   Relationships     Social connections     Talks on phone: None     Gets together: None     Attends Sikh service: None     Active member of club or organization: None     Attends meetings of clubs or organizations: None     Relationship status: None     Intimate partner violence     Fear of current or ex partner: None     Emotionally abused: None     Physically abused: None     Forced sexual activity: None   Other Topics Concern      " Service No     Blood Transfusions No     Caffeine Concern No     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 No   Social History Narrative     None       Current Outpatient Medications:      amLODIPine (NORVASC) 2.5 MG tablet, TAKE ONE TABLET BY MOUTH ONCE DAILY, Disp: 90 tablet, Rfl: 0     Cholecalciferol (VITAMIN D) 2000 UNITS CAPS, Take by mouth daily , Disp: , Rfl:      Coenzyme Q10 (COQ10) 50 MG CAPS, Take 70 mg by mouth daily, Disp: , Rfl:      estradiol (ESTRACE) 0.5 MG tablet, Take 1 tablet (0.5 mg) by mouth daily, Disp: 90 tablet, Rfl: 3     GLUCOSAMINE 500 MG OR CAPS, 1 tab BID, Disp: , Rfl: 0     MULTI-VITAMIN OR TABS, 1 TABLET DAILY, Disp: 30, Rfl: 0     OMEGA-3 CAPS   OR, 2 caps Twice daily,, Disp: , Rfl: 0     PROBIOTICA OR CHEW, take one tablet daily, Disp: , Rfl: 0     progesterone (PROMETRIUM) 100 MG capsule, Take 1 capsule (100 mg) by mouth daily, Disp: 90 capsule, Rfl: 3     typhoid (VIVOTIF) CR capsule, Take 1 capsule by mouth every other day One capsule on alternate days (day 1, 3, 5, and 7) for a total of 4 doses; all doses should be complete at least 1 week prior to potential exposure., Disp: 4 capsule, Rfl: 0     topiramate (TOPAMAX) 50 MG tablet, Take 1 tablet (50 mg) by mouth daily (Patient not taking: Reported on 9/3/2020), Disp: 90 tablet, Rfl: 3     Allergies   Allergen Reactions     No Known Drug Allergies        Past medical, surgical, social and family history were reviewed and updated in EPIC.    ROS:   12 point review of systems negative other than symptoms noted below.      OBJECTIVE:                                                      EXAM:  /78   Pulse 88   Ht 1.626 m (5' 4\")   Wt 57.2 kg " (126 lb)   LMP 08/19/2020   Breastfeeding No   BMI 21.63 kg/m     BMI: Body mass index is 21.63 kg/m .  General: Alert and oriented, no distress.  Psychiatric: Mood and affect within normal limits.  Skin: Warm and dry, no lesions, rashes or discolorations.  Neck: Neck supple. Thyroid palpbably normal in size and without nodularity.  Cardiovascular: Regular rate and rhythm, no murmurs, rubs or gallops.   Lungs:  Clear to auscultation bilaterally, breathing is unlabored.  Breasts:  Symmetric, no skin changes.  No dominant masses bilaterally.   Lymph:  No cervical, supraclavicular, infraclavicular, axillary or inguinal lymphadenopathy palpable.   Abdomen: Soft, nontender, no hepatosplenomegaly, no rebound or guarding, no masses, no hernias.   Vulva:  No external lesions, normal female hair distribution, no inguinal adenopathy.    Urethra:  Midline, non-tender, well supported, no discharge  Vagina:  Well-estrogenized, no abnormal discharge, no lesions  Cervix: nontenderr  Uterus:  anteverted, smooth contour, without enlargement, mobile, and without tenderness  Ovaries:  No masses appreciated, non-tender, mobile  Rectal Exam: deferred  Musculoskeletal: extremities normal      COUNSELING:   Reviewed preventive health counseling, as reflected in patient instructions       Contraception       (Gisel)menopause management   reports that she has never smoked. She has never used smokeless tobacco.        ASSESSMENT/PLAN:                                                      50 year old female with satisfactory annual exam  (Z78.0) Menopause  (primary encounter diagnosis)  Comment:   Plan: estradiol (ESTRACE) 0.5 MG tablet, progesterone        (PROMETRIUM) 100 MG capsule        Will stop oral contraceptive pills at the end of this pack and start HRT right away. Aware that some withdrawal bleeding may occur. At some point, could consider a switch to transdermal if she wishes to do so, which we discussed today in all forms.  Follow up for this yearly to assess continued need.    (Z01.419) Women's annual routine gynecological examination  Comment:   Plan: Pap and HPV not due today. Discussed family history of ovarian cancer in light of her grandmother's diagnosis. Her mother had genetic testing and was negative. This would put her at a minimally increased risk over baseline. No screening needed. Discussed possibility of removal of both ovaries, but in general, unless the risk of cancer is increased significantly, the benefits of leaving the ovaries in situ outweigh the risks. She agrees.    Sujey Hennessy MD

## 2020-09-03 NOTE — NURSING NOTE
"Chief Complaint   Patient presents with     Physical       Initial /78   Pulse 88   Ht 1.626 m (5' 4\")   Wt 57.2 kg (126 lb)   LMP 2020   Breastfeeding No   BMI 21.63 kg/m   Estimated body mass index is 21.63 kg/m  as calculated from the following:    Height as of this encounter: 1.626 m (5' 4\").    Weight as of this encounter: 57.2 kg (126 lb).  BP completed using cuff size: regular    Questioned patient about current smoking habits.  Pt. has never smoked.          The following HM Due: NONE      The following patient reported/Care Every where data was sent to:  P ABSTRACT QUALITY INITIATIVES [80602]  Norah Menard LPN               "

## 2020-10-01 DIAGNOSIS — I10 ESSENTIAL HYPERTENSION WITH GOAL BLOOD PRESSURE LESS THAN 140/90: ICD-10-CM

## 2020-10-01 RX ORDER — AMLODIPINE BESYLATE 2.5 MG/1
TABLET ORAL
Qty: 90 TABLET | Refills: 0 | Status: SHIPPED | OUTPATIENT
Start: 2020-10-01 | End: 2020-12-16

## 2020-10-01 NOTE — TELEPHONE ENCOUNTER
Prescription approved per [facility] Refill Protocol.    Lissett Shaikh RN

## 2020-11-16 ENCOUNTER — OFFICE VISIT (OUTPATIENT)
Dept: FAMILY MEDICINE | Facility: CLINIC | Age: 50
End: 2020-11-16
Payer: COMMERCIAL

## 2020-11-16 DIAGNOSIS — Z23 NEED FOR TDAP VACCINATION: ICD-10-CM

## 2020-11-16 DIAGNOSIS — Z20.822 EXPOSURE TO COVID-19 VIRUS: Primary | ICD-10-CM

## 2020-11-16 PROCEDURE — 90715 TDAP VACCINE 7 YRS/> IM: CPT | Performed by: PHYSICIAN ASSISTANT

## 2020-11-16 PROCEDURE — U0003 INFECTIOUS AGENT DETECTION BY NUCLEIC ACID (DNA OR RNA); SEVERE ACUTE RESPIRATORY SYNDROME CORONAVIRUS 2 (SARS-COV-2) (CORONAVIRUS DISEASE [COVID-19]), AMPLIFIED PROBE TECHNIQUE, MAKING USE OF HIGH THROUGHPUT TECHNOLOGIES AS DESCRIBED BY CMS-2020-01-R: HCPCS | Performed by: PHYSICIAN ASSISTANT

## 2020-11-16 PROCEDURE — 99213 OFFICE O/P EST LOW 20 MIN: CPT | Mod: 25 | Performed by: PHYSICIAN ASSISTANT

## 2020-11-16 PROCEDURE — 90471 IMMUNIZATION ADMIN: CPT | Performed by: PHYSICIAN ASSISTANT

## 2020-11-16 NOTE — PROGRESS NOTES
Subjective     Evita Bey is a 50 year old female who presents to clinic today for the following health issues:    HPI           Concern for COVID-19  Have you had close contact with someone with COVID-19 (Coronavirus)? Yes, I have been in contact with someone who has COVID-19/Coronavirus (confirmed by lab test). Had dinner outside on 11/3 with people that are now known to be positive COVID  Do you have a fever or chills? No  Are you having new or worsening difficulty breathing? No  Do you have new or worsening cough? No  Have you had any new or unexplained body aches? No    Have you experienced any of the following NEW symptoms?    Headache: No    Sore throat: No    Loss of taste or smell: No    Chest pain: No    Diarrhea: No    Rash: No  Who do you live with? Daughter and       Review of Systems   GENERAL:  No fever        Objective    /78 (BP Location: Right arm, Patient Position: Sitting, Cuff Size: Adult Regular)   Pulse 65   Resp 16   Wt 56.7 kg (125 lb)   SpO2 100%   Breastfeeding No   BMI 21.46 kg/m    Body mass index is 21.46 kg/m .  Physical Exam   GENERAL: No acute distress  HEENT: Normocephalic, Turbinates normal in appearance bilaterally  NEURO: Alert and non-focal        Assessment & Plan     Exposure to COVID-19 virus  COVID testing obtained. Patient is asymptomatic.  - Asymptomatic COVID-19 Virus (Coronavirus) by PCR      Avelina Ornelas PA-C  [facility]

## 2020-11-17 VITALS
RESPIRATION RATE: 16 BRPM | DIASTOLIC BLOOD PRESSURE: 78 MMHG | SYSTOLIC BLOOD PRESSURE: 130 MMHG | WEIGHT: 125 LBS | BODY MASS INDEX: 21.46 KG/M2 | OXYGEN SATURATION: 100 % | HEART RATE: 65 BPM

## 2020-11-17 LAB
SARS-COV-2 RNA SPEC QL NAA+PROBE: NOT DETECTED
SPECIMEN SOURCE: NORMAL

## 2020-11-24 ENCOUNTER — OFFICE VISIT (OUTPATIENT)
Dept: OPTOMETRY | Facility: CLINIC | Age: 50
End: 2020-11-24
Payer: COMMERCIAL

## 2020-11-24 DIAGNOSIS — H52.03 HYPEROPIA OF BOTH EYES WITH ASTIGMATISM: Primary | ICD-10-CM

## 2020-11-24 DIAGNOSIS — H52.203 HYPEROPIA OF BOTH EYES WITH ASTIGMATISM: Primary | ICD-10-CM

## 2020-11-24 DIAGNOSIS — H52.4 PRESBYOPIA: ICD-10-CM

## 2020-11-24 DIAGNOSIS — H52.31 ANISOMETROPIA: ICD-10-CM

## 2020-11-24 PROCEDURE — 92004 COMPRE OPH EXAM NEW PT 1/>: CPT | Performed by: OPTOMETRIST

## 2020-11-24 PROCEDURE — 92310 CONTACT LENS FITTING OU: CPT | Mod: GA | Performed by: OPTOMETRIST

## 2020-11-24 PROCEDURE — 92015 DETERMINE REFRACTIVE STATE: CPT | Performed by: OPTOMETRIST

## 2020-11-24 ASSESSMENT — VISUAL ACUITY
METHOD: SNELLEN - LINEAR
OD_CC: 20/20
OD_CC+: -2
OS_SC+: -2
OS_SC: 20/20
OD_CC: 20/40
CORRECTION_TYPE: CONTACTS
OS_CC: 20/40

## 2020-11-24 ASSESSMENT — REFRACTION_WEARINGRX
SPECS_TYPE: OTC READERS
OS_SPHERE: +1.25
OD_SPHERE: +1.25

## 2020-11-24 ASSESSMENT — REFRACTION_CURRENTRX
OD_DIAMETER: 14.3
OS_BRAND: ALCON DAILIES TOTAL 1 BC 8.5, D 14.1
OD_BRAND: MY DAY TORIC
OD_BASECURVE: 8.6
OS_BASECURVE: 8.5
OD_AXIS: 010
OD_CYLINDER: -0.75
OD_SPHERE: +6.00
OS_DIAMETER: 14.3
OS_SPHERE: +0.50

## 2020-11-24 ASSESSMENT — CONF VISUAL FIELD
METHOD: COUNTING FINGERS
OD_NORMAL: 1
OS_NORMAL: 1

## 2020-11-24 ASSESSMENT — CUP TO DISC RATIO
OD_RATIO: 0.4
OS_RATIO: 0.5

## 2020-11-24 ASSESSMENT — EXTERNAL EXAM - LEFT EYE: OS_EXAM: NORMAL

## 2020-11-24 ASSESSMENT — TONOMETRY
OD_IOP_MMHG: 16
IOP_METHOD: APPLANATION
OS_IOP_MMHG: 16

## 2020-11-24 ASSESSMENT — REFRACTION_MANIFEST
OS_SPHERE: +0.50
OS_AXIS: 082
OS_ADD: +2.00
OD_SPHERE: +4.75
OD_CYLINDER: +0.75
OD_AXIS: 105
OS_CYLINDER: +0.75
OD_ADD: +2.00

## 2020-11-24 ASSESSMENT — EXTERNAL EXAM - RIGHT EYE: OD_EXAM: NORMAL

## 2020-11-24 NOTE — PROGRESS NOTES
"Chief Complaint   Patient presents with     Annual Eye Exam     Contact Lens Evaluation     Blur at near  Wearing readers over cls.   Only wears cls in the right eye. Left was too difficult to handle    Previous contact lens wearer? Yes: MyDay Toric, had been prescribed a lens for os, but never could get it in.    Comfort of contact lenses : Good  Satisfied with current lenses: Yes        Last Eye Exam: 2019  Dilated Previously: Yes. Signs and symptoms of dilation were discussed. Patient consents to dilation today.      What are you currently using to see?  Contacts w/readers    Distance Vision Acuity: Satisfied with vision    Near Vision Acuity: Not satisfied     Eye Comfort: good - had a stye on the bottom lid for about 6 months. Went away after hot packing. Denies dryness, no contact lens  Discomfort   Do you use eye drops? 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       OBJECTIVE: See Ophthalmology exam    ASSESSMENT:    ICD-10-CM    1. Hyperopia of both eyes with astigmatism  H52.03     H52.203    2. Anisometropia  H52.31    3. Presbyopia  H52.4         PLAN:   Update contact lens prescription for right eye only  Increase power in readers to +2.00 when at 16\" , could use +1.25 at arms length   Could consider lasik right eye in the future    Malorie Hinkle OD   "

## 2020-11-24 NOTE — LETTER
"    11/24/2020         RE: Evita Bey  [address]  [place] [address]        Dear Colleague,    Thank you for referring your patient, Evita Bey, to the [facility]. Please see a copy of my visit note below.    Chief Complaint   Patient presents with     Annual Eye Exam     Contact Lens Evaluation     Blur at near  Wearing readers over cls.   Only wears cls in the right eye. Left was too difficult to handle    Previous contact lens wearer? Yes: [employer], had been prescribed a lens for os, but never could get it in.    Comfort of contact lenses : Good  Satisfied with current lenses: Yes        Last Eye Exam: 2019  Dilated Previously: Yes. Signs and symptoms of dilation were discussed. Patient consents to dilation today.      What are you currently using to see?  Contacts w/readers    Distance Vision Acuity: Satisfied with vision    Near Vision Acuity: Not satisfied     Eye Comfort: good - had a stye on the bottom lid for about 6 months. Went away after hot packing. Denies dryness, no contact lens  Discomfort   Do you use eye drops? 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       OBJECTIVE: See Ophthalmology exam    ASSESSMENT:    ICD-10-CM    1. Hyperopia of both eyes with astigmatism  H52.03     H52.203    2. Anisometropia  H52.31    3. Presbyopia  H52.4         PLAN:   Update contact lens prescription for right eye only  Increase power in readers to +2.00 when at 16\" , could use +1.25 at arms length   Could consider lasik right eye in the future    Malorie Hinkle OD       Again, thank you for allowing me to participate in the care of your patient.        Sincerely,        Malorie Hinkle, OD    "

## 2020-12-14 ENCOUNTER — E-VISIT (OUTPATIENT)
Dept: FAMILY MEDICINE | Facility: CLINIC | Age: 50
End: 2020-12-14
Payer: COMMERCIAL

## 2020-12-14 DIAGNOSIS — Z20.828 CONTACT WITH OR EXPOSURE TO VIRAL DISEASE: Primary | ICD-10-CM

## 2020-12-14 DIAGNOSIS — Z11.59 ENCOUNTER FOR HEPATITIS C SCREENING TEST FOR LOW RISK PATIENT: ICD-10-CM

## 2020-12-14 DIAGNOSIS — I10 ESSENTIAL HYPERTENSION WITH GOAL BLOOD PRESSURE LESS THAN 140/90: ICD-10-CM

## 2020-12-14 DIAGNOSIS — Z12.31 VISIT FOR SCREENING MAMMOGRAM: ICD-10-CM

## 2020-12-14 DIAGNOSIS — E55.9 VITAMIN D DEFICIENCY: ICD-10-CM

## 2020-12-14 DIAGNOSIS — Z13.6 CARDIOVASCULAR SCREENING; LDL GOAL LESS THAN 160: ICD-10-CM

## 2020-12-14 DIAGNOSIS — Z00.00 HEALTHCARE MAINTENANCE: ICD-10-CM

## 2020-12-14 PROCEDURE — 99207 ZZC PHYSICIAN ONLINE EVALUATION & MANAGEMENT SERVICE: CPT | Performed by: FAMILY MEDICINE

## 2020-12-16 DIAGNOSIS — E55.9 VITAMIN D DEFICIENCY: ICD-10-CM

## 2020-12-16 DIAGNOSIS — Z13.6 CARDIOVASCULAR SCREENING; LDL GOAL LESS THAN 160: ICD-10-CM

## 2020-12-16 DIAGNOSIS — Z11.59 ENCOUNTER FOR HEPATITIS C SCREENING TEST FOR LOW RISK PATIENT: ICD-10-CM

## 2020-12-16 DIAGNOSIS — I10 ESSENTIAL HYPERTENSION WITH GOAL BLOOD PRESSURE LESS THAN 140/90: ICD-10-CM

## 2020-12-16 DIAGNOSIS — Z20.828 CONTACT WITH OR EXPOSURE TO VIRAL DISEASE: ICD-10-CM

## 2020-12-16 LAB
ALBUMIN SERPL-MCNC: 3.7 G/DL (ref 3.4–5)
ALP SERPL-CCNC: 64 U/L (ref 40–150)
ALT SERPL W P-5'-P-CCNC: 31 U/L (ref 0–50)
ANION GAP SERPL CALCULATED.3IONS-SCNC: 3 MMOL/L (ref 3–14)
AST SERPL W P-5'-P-CCNC: 22 U/L (ref 0–45)
BILIRUB SERPL-MCNC: 0.4 MG/DL (ref 0.2–1.3)
BUN SERPL-MCNC: 23 MG/DL (ref 7–30)
CALCIUM SERPL-MCNC: 9.1 MG/DL (ref 8.5–10.1)
CHLORIDE SERPL-SCNC: 110 MMOL/L (ref 94–109)
CHOLEST SERPL-MCNC: 197 MG/DL
CO2 SERPL-SCNC: 27 MMOL/L (ref 20–32)
CREAT SERPL-MCNC: 0.85 MG/DL (ref 0.52–1.04)
ERYTHROCYTE [DISTWIDTH] IN BLOOD BY AUTOMATED COUNT: 12.6 % (ref 10–15)
GFR SERPL CREATININE-BSD FRML MDRD: 79 ML/MIN/{1.73_M2}
GLUCOSE SERPL-MCNC: 84 MG/DL (ref 70–99)
HCT VFR BLD AUTO: 40.5 % (ref 35–47)
HDLC SERPL-MCNC: 63 MG/DL
HGB BLD-MCNC: 13.6 G/DL (ref 11.7–15.7)
LDLC SERPL CALC-MCNC: 119 MG/DL
MCH RBC QN AUTO: 30.8 PG (ref 26.5–33)
MCHC RBC AUTO-ENTMCNC: 33.6 G/DL (ref 31.5–36.5)
MCV RBC AUTO: 92 FL (ref 78–100)
NONHDLC SERPL-MCNC: 134 MG/DL
PLATELET # BLD AUTO: 202 10E9/L (ref 150–450)
POTASSIUM SERPL-SCNC: 4 MMOL/L (ref 3.4–5.3)
PROT SERPL-MCNC: 7.3 G/DL (ref 6.8–8.8)
RBC # BLD AUTO: 4.42 10E12/L (ref 3.8–5.2)
SODIUM SERPL-SCNC: 140 MMOL/L (ref 133–144)
TRIGL SERPL-MCNC: 77 MG/DL
TSH SERPL DL<=0.005 MIU/L-ACNC: 1.89 MU/L (ref 0.4–4)
WBC # BLD AUTO: 5.8 10E9/L (ref 4–11)

## 2020-12-16 PROCEDURE — 80061 LIPID PANEL: CPT | Performed by: FAMILY MEDICINE

## 2020-12-16 PROCEDURE — 36415 COLL VENOUS BLD VENIPUNCTURE: CPT | Performed by: FAMILY MEDICINE

## 2020-12-16 PROCEDURE — 82043 UR ALBUMIN QUANTITATIVE: CPT | Performed by: FAMILY MEDICINE

## 2020-12-16 PROCEDURE — 85027 COMPLETE CBC AUTOMATED: CPT | Performed by: FAMILY MEDICINE

## 2020-12-16 PROCEDURE — 86769 SARS-COV-2 COVID-19 ANTIBODY: CPT | Performed by: FAMILY MEDICINE

## 2020-12-16 PROCEDURE — 80053 COMPREHEN METABOLIC PANEL: CPT | Performed by: FAMILY MEDICINE

## 2020-12-16 PROCEDURE — 86803 HEPATITIS C AB TEST: CPT | Performed by: FAMILY MEDICINE

## 2020-12-16 PROCEDURE — 82306 VITAMIN D 25 HYDROXY: CPT | Performed by: FAMILY MEDICINE

## 2020-12-16 PROCEDURE — 84443 ASSAY THYROID STIM HORMONE: CPT | Performed by: FAMILY MEDICINE

## 2020-12-16 RX ORDER — AMLODIPINE BESYLATE 2.5 MG/1
2.5 TABLET ORAL DAILY
Qty: 90 TABLET | Refills: 3 | Status: SHIPPED | OUTPATIENT
Start: 2020-12-16 | End: 2022-02-10

## 2020-12-16 NOTE — PATIENT INSTRUCTIONS
[facility]  [address] [place] [address]  Office: [phone]   Fax:    [phone]       Please come in fasting :  nothing to eat for at least 8 , preferably 12 hours ahead of appointment. May have water, black coffee or plain tea, no creamers or sweeteners of any kind, please.     Return in about 1 year (around 12/14/2021) for BP Recheck, Physical/Preventative Visit, with Dr. Purcell. , please call [facility] [phone] to schedule      Thank you so much for choosing [facility].  Please contact us with any questions that you may have.   We appreciate the opportunity to serve you now and look forward to supporting your healthcare needs for a long time to come!    Most Sincerely,     Patti Purcell MD

## 2020-12-16 NOTE — TELEPHONE ENCOUNTER
Provider E-Visit time total (minutes): 10    BP Readings from Last 3 Encounters:   11/17/20 130/78   09/03/20 120/78   10/31/19 110/68

## 2020-12-17 LAB
COVID-19 SPIKE RBD ABY TITER: NORMAL
COVID-19 SPIKE RBD ABY: NEGATIVE
CREAT UR-MCNC: 142 MG/DL
DEPRECATED CALCIDIOL+CALCIFEROL SERPL-MC: <58 UG/L (ref 20–75)
HCV AB SERPL QL IA: NONREACTIVE
MICROALBUMIN UR-MCNC: 8 MG/L
MICROALBUMIN/CREAT UR: 5.72 MG/G CR (ref 0–25)
VITAMIN D2 SERPL-MCNC: <5 UG/L
VITAMIN D3 SERPL-MCNC: 53 UG/L

## 2021-01-23 ENCOUNTER — HEALTH MAINTENANCE LETTER (OUTPATIENT)
Age: 51
End: 2021-01-23

## 2021-04-12 ENCOUNTER — MYC MEDICAL ADVICE (OUTPATIENT)
Dept: FAMILY MEDICINE | Facility: CLINIC | Age: 51
End: 2021-04-12

## 2021-04-12 DIAGNOSIS — I10 ESSENTIAL HYPERTENSION WITH GOAL BLOOD PRESSURE LESS THAN 140/90: ICD-10-CM

## 2021-04-13 RX ORDER — CANDESARTAN 8 MG/1
TABLET ORAL
Qty: 90 TABLET | Refills: 1 | Status: SHIPPED | OUTPATIENT
Start: 2021-04-13 | End: 2021-10-12

## 2021-04-13 NOTE — TELEPHONE ENCOUNTER
Please see my chart message below     Please review and advise     Thank you     Zainab Bonner RN, BSN  [facility]

## 2021-06-03 VITALS — WEIGHT: 121.19 LBS | HEIGHT: 64 IN | BODY MASS INDEX: 20.69 KG/M2

## 2021-06-03 NOTE — ANESTHESIA POSTPROCEDURE EVALUATION
Patient: Evita Banerjee  MINI ABDOMINOPLASTY  Anesthesia type: general    Patient location: PACU  Last vitals:   Vitals Value Taken Time   /79 11/18/2019  9:00 AM   Temp  11/18/2019  9:06 AM   Pulse 65 11/18/2019  9:03 AM   Resp 16 11/18/2019  9:00 AM   SpO2 100 % 11/18/2019  9:03 AM   Vitals shown include unvalidated device data.  Post vital signs: stable  Level of consciousness: awake and responds to simple questions  Post-anesthesia pain: pain controlled  Post-anesthesia nausea and vomiting: no  Pulmonary: unassisted, return to baseline  Cardiovascular: stable and blood pressure at baseline  Hydration: adequate  Anesthetic events: no    QCDR Measures:  ASA# 11 - Gisel-op Cardiac Arrest: ASA11B - Patient did NOT experience unanticipated cardiac arrest  ASA# 12 - Gisel-op Mortality Rate: ASA12B - Patient did NOT die  ASA# 13 - PACU Re-Intubation Rate: ASA13B - Patient did NOT require a new airway mgmt  ASA# 10 - Composite Anes Safety: ASA10A - No serious adverse event    Additional Notes:

## 2021-06-03 NOTE — ANESTHESIA PREPROCEDURE EVALUATION
Anesthesia Evaluation      Patient summary reviewed   No history of anesthetic complications     Airway   Mallampati: I   Pulmonary - negative ROS and normal exam    breath sounds clear to auscultation                         Cardiovascular - normal exam  (+) hypertension well controlled, ,     Rhythm: regular  Rate: normal,         Neuro/Psych - negative ROS     Endo/Other - negative ROS      GI/Hepatic/Renal - negative ROS           Dental                         Anesthesia Plan  Planned anesthetic: general endotracheal    ASA 2   Induction: intravenous   Anesthetic plan and risks discussed with: patient and spouse  Anesthesia plan special considerations: antiemetics,   Post-op plan: routine recovery

## 2021-06-03 NOTE — ANESTHESIA CARE TRANSFER NOTE
Last vitals:   Vitals:    11/18/19 0840   BP: 143/77   Pulse: 77   Resp: 16   Temp: 36.8  C (98.2  F)   SpO2: 100%     Patient's level of consciousness is drowsy  Spontaneous respirations: yes  Maintains airway independently: yes  Dentition unchanged: yes  Oropharynx: oropharynx clear of all foreign objects    QCDR Measures:  ASA# 20 - Surgical Safety Checklist: WHO surgical safety checklist completed prior to induction    PQRS# 430 - Adult PONV Prevention: 4558F - Pt received => 2 anti-emetic agents (different classes) preop & intraop  ASA# 8 - Peds PONV Prevention: NA - Not pediatric patient, not GA or 2 or more risk factors NOT present  PQRS# 424 - Gisel-op Temp Management: 4559F - At least one body temp DOCUMENTED => 35.5C or 95.9F within required timeframe  PQRS# 426 - PACU Transfer Protocol: - Transfer of care checklist used  ASA# 14 - Acute Post-op Pain: ASA14A - Patient experienced pain >= 7 out of 10

## 2021-07-20 ENCOUNTER — HOSPITAL ENCOUNTER (OUTPATIENT)
Dept: MAMMOGRAPHY | Facility: CLINIC | Age: 51
Discharge: HOME | End: 2021-07-20
Attending: OBSTETRICS & GYNECOLOGY | Admitting: OBSTETRICS & GYNECOLOGY
Payer: COMMERCIAL

## 2021-07-20 DIAGNOSIS — Z12.31 VISIT FOR SCREENING MAMMOGRAM: ICD-10-CM

## 2021-07-20 PROCEDURE — 77063 BREAST TOMOSYNTHESIS BI: CPT

## 2021-07-27 ENCOUNTER — HOSPITAL ENCOUNTER (OUTPATIENT)
Dept: ULTRASOUND IMAGING | Facility: CLINIC | Age: 51
End: 2021-07-27
Attending: OBSTETRICS & GYNECOLOGY
Payer: COMMERCIAL

## 2021-07-27 DIAGNOSIS — R92.8 ABNORMAL MAMMOGRAM: ICD-10-CM

## 2021-07-27 PROCEDURE — 76642 ULTRASOUND BREAST LIMITED: CPT | Mod: LT

## 2021-09-04 ENCOUNTER — HEALTH MAINTENANCE LETTER (OUTPATIENT)
Age: 51
End: 2021-09-04

## 2021-10-11 DIAGNOSIS — I10 ESSENTIAL HYPERTENSION WITH GOAL BLOOD PRESSURE LESS THAN 140/90: ICD-10-CM

## 2021-10-12 RX ORDER — CANDESARTAN 8 MG/1
TABLET ORAL
Qty: 90 TABLET | Refills: 0 | Status: SHIPPED | OUTPATIENT
Start: 2021-10-12 | End: 2022-01-12

## 2021-10-30 ENCOUNTER — HEALTH MAINTENANCE LETTER (OUTPATIENT)
Age: 51
End: 2021-10-30

## 2022-01-12 DIAGNOSIS — I10 ESSENTIAL HYPERTENSION WITH GOAL BLOOD PRESSURE LESS THAN 140/90: ICD-10-CM

## 2022-01-12 RX ORDER — CANDESARTAN 8 MG/1
TABLET ORAL
Qty: 90 TABLET | Refills: 0 | Status: SHIPPED | OUTPATIENT
Start: 2022-01-12 | End: 2022-02-10

## 2022-01-16 DIAGNOSIS — Z78.0 MENOPAUSE: ICD-10-CM

## 2022-01-17 RX ORDER — ESTRADIOL 1 MG/1
TABLET ORAL
Qty: 90 TABLET | Refills: 0 | Status: SHIPPED | OUTPATIENT
Start: 2022-01-17 | End: 2022-02-10

## 2022-01-25 ENCOUNTER — OFFICE VISIT (OUTPATIENT)
Dept: OPTOMETRY | Facility: CLINIC | Age: 52
End: 2022-01-25
Payer: COMMERCIAL

## 2022-01-25 DIAGNOSIS — H52.4 PRESBYOPIA: ICD-10-CM

## 2022-01-25 DIAGNOSIS — H52.03 HYPEROPIA OF BOTH EYES WITH ASTIGMATISM: Primary | ICD-10-CM

## 2022-01-25 DIAGNOSIS — H52.31 ANISOMETROPIA: ICD-10-CM

## 2022-01-25 DIAGNOSIS — H52.203 HYPEROPIA OF BOTH EYES WITH ASTIGMATISM: Primary | ICD-10-CM

## 2022-01-25 PROCEDURE — 92015 DETERMINE REFRACTIVE STATE: CPT | Performed by: OPTOMETRIST

## 2022-01-25 PROCEDURE — 92014 COMPRE OPH EXAM EST PT 1/>: CPT | Performed by: OPTOMETRIST

## 2022-01-25 PROCEDURE — 92310 CONTACT LENS FITTING OU: CPT | Mod: GA | Performed by: OPTOMETRIST

## 2022-01-25 ASSESSMENT — REFRACTION_CURRENTRX
OD_BASECURVE: 8.6
OD_DIAMETER: 14.3
OD_SPHERE: +6.00
OD_CYLINDER: -0.75
OD_BRAND: MY DAY TORIC
OD_AXIS: 010

## 2022-01-25 ASSESSMENT — REFRACTION_MANIFEST
OD_SPHERE: +5.00
OS_SPHERE: +0.75
OS_ADD: +2.00
OD_CYLINDER: +0.75
OS_CYLINDER: +0.50
OD_AXIS: 105
OS_AXIS: 090
OD_ADD: +2.00

## 2022-01-25 ASSESSMENT — EXTERNAL EXAM - LEFT EYE: OS_EXAM: NORMAL

## 2022-01-25 ASSESSMENT — CONF VISUAL FIELD
OS_NORMAL: 1
OD_NORMAL: 1
METHOD: COUNTING FINGERS

## 2022-01-25 ASSESSMENT — VISUAL ACUITY
OS_SC: 20/80
CORRECTION_TYPE: CONTACTS
METHOD: SNELLEN - LINEAR
OS_SC+: -1
OD_CC: 20/80-1
OD_CC: 20/25
OS_SC: 20/30

## 2022-01-25 ASSESSMENT — TONOMETRY
OS_IOP_MMHG: 16
OD_IOP_MMHG: 16
IOP_METHOD: APPLANATION

## 2022-01-25 ASSESSMENT — EXTERNAL EXAM - RIGHT EYE: OD_EXAM: NORMAL

## 2022-01-25 ASSESSMENT — CUP TO DISC RATIO
OD_RATIO: 0.4
OS_RATIO: 0.5

## 2022-01-25 NOTE — LETTER
1/25/2022         RE: Evita Bey  [address]  [place] [address]        Dear Colleague,    [facility]. Please see a copy of my visit note below.    Chief Complaint   Patient presents with     Annual Eye Exam      Vision is not as crisp as she would like with contacts and readers  Last Eye Exam: 11/24/2020  Dilated Previously: Yes, side effects of dilation explained today    What are you currently using to see?  Readers over top contacts for near and contacts       Distance Vision Acuity: Noticed gradual change in both eyes    Near Vision Acuity: Satisfied with vision while reading and using computer with readers    Eye Comfort: good  Do you use eye drops? : No  Occupation or Hobbies: MD for     [employer]          Medical, surgical and family histories reviewed and updated 1/25/2022.       OBJECTIVE: See Ophthalmology exam    ASSESSMENT:/  PLAN:     ICD-10-CM    1. Hyperopia of both eyes with astigmatism  H52.03     H52.203    2. Anisometropia  H52.31    3. Presbyopia  H52.4       Since cut off on contacts is +6.00, will put orx from R and mild distance prescription in a pair of progressive addition lens , has not been able to tolerate anisometropia in spectacles in the past    PLAN:   Could consider lasik consult R instead of contact lens at some point   Would still use the progressive addition lens   Discussed be for presbyopia   Discussed VSP/ with contacts or glasses     Malorie Hinkle OD         Again, thank you for allowing me to participate in the care of your patient.        Sincerely,        Malorie Hinkle, OD

## 2022-01-25 NOTE — PROGRESS NOTES
Chief Complaint   Patient presents with     Annual Eye Exam      Vision is not as crisp as she would like with contacts and readers  Last Eye Exam: 11/24/2020  Dilated Previously: Yes, side effects of dilation explained today    What are you currently using to see?  Readers over top contacts for near and contacts       Distance Vision Acuity: Noticed gradual change in both eyes    Near Vision Acuity: Satisfied with vision while reading and using computer with readers    Eye Comfort: good  Do you use eye drops? : No  Occupation or Hobbies: MD for     [employer]          Medical, surgical and family histories reviewed and updated 1/25/2022.       OBJECTIVE: See Ophthalmology exam    ASSESSMENT:/  PLAN:     ICD-10-CM    1. Hyperopia of both eyes with astigmatism  H52.03     H52.203    2. Anisometropia  H52.31    3. Presbyopia  H52.4       Since cut off on contacts is +6.00, will put orx from R and mild distance prescription in a pair of progressive addition lens , has not been able to tolerate anisometropia in spectacles in the past    PLAN:   Could consider lasik consult R instead of contact lens at some point   Would still use the progressive addition lens   Discussed be for presbyopia   Discussed VSP/ with contacts or glasses     Malorie Hinkle OD

## 2022-02-08 ASSESSMENT — ENCOUNTER SYMPTOMS
PARESTHESIAS: 0
BREAST MASS: 0
FEVER: 0
DIZZINESS: 0
FREQUENCY: 0
CHILLS: 0
NERVOUS/ANXIOUS: 0
SORE THROAT: 0
HEMATURIA: 0
MYALGIAS: 0
HEMATOCHEZIA: 0
HEARTBURN: 0
WEAKNESS: 0
CONSTIPATION: 0
EYE PAIN: 0
NAUSEA: 0
DIARRHEA: 0
PALPITATIONS: 0
HEADACHES: 0
JOINT SWELLING: 0
ARTHRALGIAS: 0
COUGH: 0
DYSURIA: 0
ABDOMINAL PAIN: 0
SHORTNESS OF BREATH: 0

## 2022-02-10 ENCOUNTER — OFFICE VISIT (OUTPATIENT)
Dept: FAMILY MEDICINE | Facility: CLINIC | Age: 52
End: 2022-02-10
Payer: COMMERCIAL

## 2022-02-10 DIAGNOSIS — E55.9 VITAMIN D DEFICIENCY: ICD-10-CM

## 2022-02-10 DIAGNOSIS — I10 ESSENTIAL HYPERTENSION WITH GOAL BLOOD PRESSURE LESS THAN 140/90: ICD-10-CM

## 2022-02-10 DIAGNOSIS — Z00.01 ENCOUNTER FOR ROUTINE ADULT MEDICAL EXAM WITH ABNORMAL FINDINGS: Primary | ICD-10-CM

## 2022-02-10 DIAGNOSIS — Z13.6 CARDIOVASCULAR SCREENING; LDL GOAL LESS THAN 160: ICD-10-CM

## 2022-02-10 DIAGNOSIS — Z78.0 MENOPAUSE: ICD-10-CM

## 2022-02-10 PROCEDURE — 99214 OFFICE O/P EST MOD 30 MIN: CPT | Mod: 25 | Performed by: FAMILY MEDICINE

## 2022-02-10 PROCEDURE — 99396 PREV VISIT EST AGE 40-64: CPT | Performed by: FAMILY MEDICINE

## 2022-02-10 RX ORDER — ESTRADIOL 1 MG/1
1 TABLET ORAL DAILY
Qty: 90 TABLET | Refills: 3 | Status: SHIPPED | OUTPATIENT
Start: 2022-02-10 | End: 2022-12-06 | Stop reason: ALTCHOICE

## 2022-02-10 RX ORDER — PROGESTERONE 100 MG/1
CAPSULE ORAL
Qty: 126 CAPSULE | Refills: 3 | Status: SHIPPED | OUTPATIENT
Start: 2022-02-10 | End: 2022-12-06

## 2022-02-10 RX ORDER — CANDESARTAN 8 MG/1
TABLET ORAL
Qty: 90 TABLET | Refills: 0 | Status: SHIPPED | OUTPATIENT
Start: 2022-02-10 | End: 2022-07-07

## 2022-02-10 ASSESSMENT — ENCOUNTER SYMPTOMS
HEADACHES: 0
BREAST MASS: 0
SORE THROAT: 0
DIZZINESS: 0
ARTHRALGIAS: 0
DYSURIA: 0
CHILLS: 0
DIARRHEA: 0
MYALGIAS: 0
FREQUENCY: 0
COUGH: 0
ABDOMINAL PAIN: 0
SHORTNESS OF BREATH: 0
NERVOUS/ANXIOUS: 0
WEAKNESS: 0
HEARTBURN: 0
EYE PAIN: 0
PALPITATIONS: 0
HEMATOCHEZIA: 0
NAUSEA: 0
PARESTHESIAS: 0
HEMATURIA: 0
FEVER: 0
CONSTIPATION: 0
JOINT SWELLING: 0

## 2022-02-10 ASSESSMENT — MIFFLIN-ST. JEOR: SCORE: 1243.18

## 2022-02-10 NOTE — PATIENT INSTRUCTIONS
[facility]  [address] [place] [id number]  Office: [phone]   Fax:    [phone]         Patient Education     Prevention Guidelines, Women Ages 50 to 64  Screening tests and vaccines are an important part of managing your health. A screening test is done to find possible disorders or diseases in people who don't have any symptoms. The goal is to find a disease early so lifestyle changes can be made and you can be watched more closely to reduce the risk of disease, or to detect it early enough to treat it most effectively. Screening tests are not considered diagnostic, but are used to determine if more testing is needed. Health counseling is essential, too. Below are guidelines for these, for women ages 50 to 64. Keep in mind that screening advice varies among expert groups. Talk with your healthcare provider about which tests are best for you and to make sure you re up to date on what you need.   Screening  Who needs it  How often    Type 2 diabetes or prediabetes  All women beginning at age 45 and women without symptoms at any age who are overweight or obese and have 1 or more additional risk factors for diabetes.  At  least every 3 years    Type 2 diabetes or prediabetes  All women diagnosed with gestational diabetes  Lifelong testing at least every 3 years    Type 2 diabetes All women with prediabetes  Every year   Unhealthy alcohol use  All women in this age group  At routine exams   Blood pressure All women in this age group  Yearly checkup if your blood pressure is normal   Normal blood pressure is less than 120/80 mm Hg   If your blood pressure reading is higher than normal, follow the advice of your healthcare provider    Breast cancer All women at average risk in this age group  Yearly mammogram should be done until age 54. At age 55, you can switch to every other year or choose to continue yearly.   All women should know how their breasts normally look and feel  and know the possible benefits and risks of breast cancer screening with mammograms.    Cervical cancer All women in this age group, except women who have had a complete hysterectomy  Pap test every 3 years or Pap test with human papillomavirus (HPV) test every 5 years    Chlamydia Women who are sexually active and at increased risk for infection  At yearly routine exams    Colorectal cancer All women at average risk in this age group  Multiple tests are available and are used at different times. Possible tests include:     Flexible sigmoidoscopy every 5 years, or    Colonoscopy every 10 years, or    CT colonography (virtual colonoscopy) every 5 years, or    Yearly fecal occult blood test, or    Yearly fecal immunochemical test every year, or    Stool DNA test, every 3 years  If you choose a test other than a colonoscopy and have an abnormal test result, you will need to follow up with a colonoscopy. Screening advice varies among expert groups. Talk with your healthcare provider about which tests are best for you.   Some people should be screened using a different schedule because of their personal or family health history. Talk with your healthcare provider about your health history.    Depression All women in this age group  At routine exams   Gonorrhea Sexually active women at increased risk for infection  At yearly routine exams    Hepatitis C Anyone at increased risk; 1 time for those born between 1945 and 1965  At routine exams   High cholesterol or triglycerides  All women in this age group who are at risk for coronary artery disease  At least every 5 years; talk with your healthcare provider about your risk    HIV All women At least once during your lifetime; yearly if at high risk    Lung cancer Women between the ages of 55 to 74 who are in fairly good health and are at higher risk for lung cancer         Currently smoke or have  quit within past 15 years         30-pack-year smoking history  , Eligibility  criteria and age limit (possibly up to age 80) may vary across major organizations  Yearly lung cancer screening with a low-dose CT scan (LDCT) Talk with your healthcare provider for more information.    Obesity All women in this age group  At yearly routine exams    Osteoporosis Women who are postmenopausal  Talk with your healthcare provider    Syphilis Women at increased risk for infection  At routine exams; talk with your healthcare provider    Tuberculosis Women at increased risk for infection  Talk with your healthcare provider    Vision All women in this age group  Talk with your healthcare provider    Vaccine Who needs it How often   Chickenpox (varicella)  All women in this age group who have no record of this infection or vaccine  2 doses; the second dose should be given at least 4 weeks after the first dose    Hepatitis A Women at increased risk for infection  2 or 3 doses (depending on the vaccine) given at least 6 months apart; talk with your healthcare provider    Hepatitis B Women at increased risk for infection  2 or 3 doses (depending on the vaccine) ; second dose should be given 1 month after the first dose; if a third dose, it should be given at least 2 months after the second dose and at least 4 months after the first dose; talk with your healthcare provider    Haemophilus influenzae Type B (HIB)  Women at increased risk for infection  1 or 3 doses; talk with your healthcare provider    Influenza (flu) All women in this age group  Once a year   Measles, mumps, rubella (MMR)  Women in this age group born in 1957 or later who have no record of these infections or vaccines  1 or 2doses   Meningococcal Women at increased risk for infection  1 or more doses; talk with your healthcare provider    Pneumococcal conjugate vaccine (PCV13) and pneumococcal polysaccharide vaccine (PPSV23)  Women at increased risk for infection  PCV13: 1 dose ages 19 to 64 (protects against 13 types of pneumococcal bacteria)    PPSV23: 1 or 2 doses through age 64(protects against 23 types of pneumococcal bacteria)   Talk with your healthcare provider   Tetanus/diphtheria/pertussis (Td/Tdap) booster All women in this age group  Td every 10 years, or a 1-time dose of Tdap instead of a Td booster after age 18, then Td every 10 years    Recombinant zoster vaccine (RZV)  All women ages 50 and older  If 2 doses; the 2nd dose is given 2 to 6 months after the first. This is given even if you've had shingles before or had a previous zoster live vaccine.    Counseling Who needs it How often   BRCA gene mutation testing for breast and ovarian cancer susceptibility  Women with increased risk for having gene mutation  When your risk is known; talk with your healthcare provider    Breast cancer and chemoprevention  Women at high risk for breast cancer  When your risk is known; talk with your healthcare provider    Diet and exercise Women who are overweight or obese  When diagnosed, and then at routine exams    Sexually transmitted infection prevention  Women at increased risk for infection  At routine exams; talk with your healthcare provider    Use of daily aspirin  Women ages 50 and up who are at high risk for cardiovascular health problems and not at increased risk for bleeding as identified by their healthcare provider  When your risk is known; talk with your healthcare provider    Use of tobacco and the health effects it can cause  All women in this age group  Every exam   [employer] last reviewed this educational content on 6/1/2020 2000-2021 [employer]. All rights reserved. This information is not intended as a substitute for professional medical care. Always follow your healthcare professional's instructions.         Thank you so much or choosing [facility]  for your Health Care. It was a pleasure seeing you at your visit today! Please contact us with any questions or concerns you may have.                    Patti Purcell MD                              To reach your [facility] care team after hours call:   [phone]    PLEASE NOTE OUR HOURS HAVE CHANGED secondary to COVID-19 coronavirus pandemic, as we are trying to minimize patient exposure to the virus,  which is now widespread in the Critical access hospital.  These hours may change with very little notice.  We apologize for any inconvenience.       Our current clinic hours are:          Monday- Thursday   7:00am - 6:00pm  in person.      Friday  7:00am- 5:00pm                       Saturday and Sunday : Closed to in person and virtual visits        We have telephone and virtual visit times available between    7:00am - 6pm on Monday-Friday as well.                                                Phone:  [phone]      Our pharmacy hours: Monday through Friday 8:00am to 5:00pm                        Saturday - 9:00 am to 12 noon       Sunday : Closed.              Phone:  [phone]              ###  Please note: at this time we are not accepting any walk-in visits. ###      There is also information available at our web site:  [email or web address]    If your provider ordered any lab tests and you do not receive the results within 10 business days, please call the clinic.    If you need a medication refill please contact your pharmacy.  Please allow 3 business days for your refill to be completed.    Our clinic offers telephone visits and e visits.  Please ask one of your team members to explain more.      Use [employer] (secure email communication and access to your chart) to send your primary care provider a message or make an appointment. Ask someone on your Team how to sign up for [employer].

## 2022-02-10 NOTE — PROGRESS NOTES
SUBJECTIVE:   CC: Evita Bey  is an 51 year old woman who presents for preventive health visit and the following other medical problems:      1. Encounter for routine adult medical exam with abnormal findings    2. Essential hypertension with goal blood pressure less than 140/90    3. Menopause    4. CARDIOVASCULAR SCREENING; LDL GOAL LESS THAN 160    5. Vitamin D deficiency         Pt declined vitamin D testing - was stable.   Patient has been advised of split billing requirements and indicates understanding: Yes  Healthy Habits:     Getting at least 3 servings of Calcium per day:  Yes    Bi-annual eye exam:  Yes    Dental care twice a year:  Yes    Sleep apnea or symptoms of sleep apnea:  None    Diet:  Regular (no restrictions)    Frequency of exercise:  4-5 days/week    Duration of exercise:  30-45 minutes    Taking medications regularly:  Yes    Medication side effects:  None    PHQ-2 Total Score: 0    Additional concerns today:  No    Wt Readings from Last 5 Encounters:   02/10/22 65.3 kg (144 lb)   11/17/20 56.7 kg (125 lb)   09/03/20 57.2 kg (126 lb)   11/18/19 55 kg (121 lb 3 oz)   10/31/19 55.8 kg (123 lb)     Gained weight after depression resolved.   Went up to 1mg of estrogen with cycling of her progesterone.  Has her ob/gyn exams done with Sujey Hennessy.      Hypertension Follow-up: well controlled today. Doing well with current medication. Bp today 128/78.       Do you check your blood pressure regularly outside of the clinic? No     Are you following a low salt diet? No    Are your blood pressures ever more than 140 on the top number (systolic) OR more   than 90 on the bottom number (diastolic), for example 140/90? No     BP Readings from Last 3 Encounters:   02/10/22 128/78   11/17/20 130/78   09/03/20 120/78         Creatinine   Date Value Ref Range Status   12/16/2020 0.85 0.52 - 1.04 mg/dL Final     Is not fasting today , will get her labs done at her [facility] in the near future.        Today's PHQ-2 Score:   PHQ-2 ( 1999 [employer]) 2/8/2022   Q1: Little interest or pleasure in doing things 0   Q2: Feeling down, depressed or hopeless 0   PHQ-2 Score 0   PHQ-2 Total Score (12-17 Years)- Positive if 3 or more points; Administer PHQ-A if positive -   Q1: Little interest or pleasure in doing things Not at all   Q2: Feeling down, depressed or hopeless Not at all   PHQ-2 Score 0       Abuse: Current or Past (Physical, Sexual or Emotional) - No  Do you feel safe in your environment? Yes    Have you ever done Advance Care Planning? (For example, a Health Directive, POLST, or a discussion with a medical provider or your loved ones about your wishes):  Discussed with patient    Social History     Tobacco Use     Smoking status: Never Smoker     Smokeless tobacco: Never Used   Substance Use Topics     Alcohol use: Yes     Alcohol/week: 0.0 standard drinks     Comment: 0-1 wine cooler a week         Alcohol Use 2/8/2022   Prescreen: >3 drinks/day or >7 drinks/week? No       Reviewed orders with patient.  Reviewed health maintenance and updated orders accordingly - Yes  Lab work is in process  Labs reviewed in EPIC  BP Readings from Last 3 Encounters:   02/10/22 128/78   11/17/20 130/78   09/03/20 120/78    Wt Readings from Last 3 Encounters:   02/10/22 65.3 kg (144 lb)   11/17/20 56.7 kg (125 lb)   09/03/20 57.2 kg (126 lb)               Patient Active Problem List   Diagnosis     Family history of ischemic heart disease - father's side of family - with normal lipids     Family history of ovarian cancer - following with Dr. Hennessy now - [facility] ob/gyn  - ? BSO in late 40's      CARDIOVASCULAR SCREENING; LDL GOAL LESS THAN 160     Essential hypertension with goal blood pressure less than 140/90     Chest pain, unspecified     Irritable bowel syndrome with diarrhea     Anisometropia     Vitamin D deficiency     Menopause     Past Surgical History:   Procedure Laterality Date     ABDOMINOPLASTY N/A 11/18/2019     Procedure: MINI ABDOMINOPLASTY;  Surgeon: Carolina Kitchen MD;  Location: [facility] OR;  Service: Plastics      SECTION       COLONOSCOPY      Dr. Castro [facility]     COLONOSCOPY N/A 10/12/2018    Procedure: COLONOSCOPY;  COLONOSCOPY [fv]  ;  Surgeon: Alexander Castro MD;  Location:  GI     ESOPHAGOSCOPY, GASTROSCOPY, DUODENOSCOPY (EGD), COMBINED N/A 2019    Procedure: ESOPHAGOGASTRODUODENOSCOPY, WITH BIOPSY;  Surgeon: Alexander Castro MD;  Location:  GI     mini tummy tuck  2019     ZZC  DELIVERY ONLY  94,96,99,02    4 C/S       Social History     Tobacco Use     Smoking status: Never Smoker     Smokeless tobacco: Never Used   Substance Use Topics     Alcohol use: Yes     Alcohol/week: 0.0 standard drinks     Comment: 0-1 wine cooler a week     Family History   Problem Relation Age of Onset     Hypertension Mother      Osteoporosis Mother         Osteopenia     Bladder Cancer Mother      Gallbladder Disease Mother         Gallbladder removed- with some dysplasia noted     C.A.D. Father         MI at age 59 and stented -      Hypertension Father      Gastrointestinal Disease Father         gallstones and gallstone pancreatitis at age 61     Lipids Father         low HDL     Genitourinary Problems Father         uric acid kidney stone     Dementia Father         possible early dementia     C.A.D. Paternal Grandfather         bypass in 70's     Depression Paternal Grandfather         in old age     Respiratory Paternal Grandfather         sleep apnea     Hypertension Maternal Grandfather      Alzheimer Disease Maternal Grandfather         in late 70's     Gastrointestinal Disease Paternal Grandmother          of gallstone pancreatitis at age 50     Cancer Maternal Grandmother          of ovarian cancer at age 50     Coronary Artery Disease Early Onset Other         father's side of family - late 50's with normal LDLs and BP's      Aneurysm Paternal Half-Sister       Colon Cancer Maternal Uncle         3 maternal great-uncles with colon cancer      Aneurysm Paternal Uncle          Current Outpatient Medications   Medication Sig Dispense Refill     candesartan (ATACAND) 8 MG tablet TAKE ONE TABLET BY MOUTH DAILY 90 tablet 0     Cholecalciferol (VITAMIN D) 2000 UNITS CAPS Take by mouth daily        Coenzyme Q10 (COQ10) 50 MG CAPS Take 70 mg by mouth daily       estradiol (ESTRACE) 1 MG tablet Take 1 tablet (1 mg) by mouth daily 90 tablet 3     GLUCOSAMINE 500 MG OR CAPS 1 tab BID  0     MULTI-VITAMIN OR TABS 1 TABLET DAILY 30 0     OMEGA-3 CAPS   OR 2 caps Twice daily,  0     PROBIOTICA OR CHEW take one tablet daily  0     progesterone (PROMETRIUM) 100 MG capsule Rotates 2 tabs daily for two weeks then  1 tab po daily  for 2 wks 126 capsule 3     Allergies   Allergen Reactions     No Known Drug Allergies      Recent Labs   Lab Test 12/16/20  0906 10/31/19  1440 04/25/19  1231 03/01/18  1447 01/26/17  1138 01/06/17  1028   *  --   --  99  --  97   HDL 63  --   --  48*  --  67   TRIG 77  --   --  100  --  112   ALT 31 25 29  --    < > 25   CR 0.85 0.93 0.92 0.92   < > 1.08*   GFRESTIMATED 79 71 72 64   < > 54*   GFRESTBLACK >90 82 84 78   < > 65   POTASSIUM 4.0 3.6 3.9 3.5   < > 4.1   TSH 1.89  --  1.37 1.33   < >  --     < > = values in this interval not displayed.        Breast Cancer Screening:    FHS-7:   Breast CA Risk Assessment (FHS-7) 7/20/2021 2/8/2022   Did any of your first-degree relatives have breast or ovarian cancer? No No   Did any of your relatives have bilateral breast cancer? No No   Did any man in your family have breast cancer? No No   Did any woman in your family have breast and ovarian cancer? No No   Did any woman in your family have breast cancer before age 50 y? No No   Do you have 2 or more relatives with breast and/or ovarian cancer? No No   Do you have 2 or more relatives with breast and/or bowel cancer? No No       Mammogram Screening:  Recommended annual mammography  Pertinent mammograms are reviewed under the imaging tab.    History of abnormal Pap smear: NO - age 30- 65 PAP every 3 years recommended  PAP / HPV Latest Ref Rng & Units 2019   PAP (Historical) - NIL NIL NIL   HPV16 NEG:Negative Negative Negative -   HPV18 NEG:Negative Negative Negative -   HRHPV NEG:Negative Negative Negative -     Reviewed and updated as needed this visit by clinical staff   Tobacco  Allergies  Meds  Problems  Med Hx  Surg Hx  Fam Hx          Reviewed and updated as needed this visit by Provider   Tobacco  Allergies  Meds  Problems  Med Hx  Surg Hx  Fam Hx         Past Medical History:   Diagnosis Date     Calculus of kidney 1996    Calcium stone - etiology after w/u was excess calcium intake     Fam hx-cardiovas dis NEC     father with MI at age 58 yo, s/p PTCA     Family history of ovarian cancer - following with Dr. Stuart - ? BSO in late        Fertility testing     used lifetime total of 11 months of Clomid and 2 months of gonadotropins     Hypertension      Volvulus (H) child    Resolved without surgery - hospitalized for 1 day      Past Surgical History:   Procedure Laterality Date     ABDOMINOPLASTY N/A 2019    Procedure: MINI ABDOMINOPLASTY;  Surgeon: Carolina Kitchen MD;  Location: [facility];  Service: Plastics      SECTION       COLONOSCOPY      Dr. Castro [facility]     COLONOSCOPY N/A 10/12/2018    Procedure: COLONOSCOPY;  COLONOSCOPY [fv]  ;  Surgeon: Alexander Castro MD;  Location: [facility]     ESOPHAGOSCOPY, GASTROSCOPY, DUODENOSCOPY (EGD), COMBINED N/A 2019    Procedure: ESOPHAGOGASTRODUODENOSCOPY, WITH BIOPSY;  Surgeon: Alexander Castro MD;  Location:  GI     mini tummy [facility]  2019     ZZC  DELIVERY ONLY  94,96,99,02    4 C/S     OB History    Para Term  AB Living   6 4 4 0 2 4   SAB IAB Ectopic Multiple Live Births   2 0 0 0 4  "     # Outcome Date GA Lbr Berry/2nd Weight Sex Delivery Anes PTL Lv   6 Term 02 37w0d 02:00 4.082 kg (9 lb) F CS SPINAL  ELSIE      Birth Comments: elective repeat C/S after SROM and spontaneous labor      Name: Demi      Apgar1: 8  Apgar5: 9   5 SAB 01 6w0d    SAB   DEC      Birth Comments: NO D&C   4 Term 99 39w0d  3.685 kg (8 lb 2 oz) M CS EPIDURAL  ELSIE      Birth Comments: elective repeat C/S      Name: Srinivasa      Apgar1: 9  Apgar5: 10   3 Term 96 38w0d 36:00 4.139 kg (9 lb 2 oz) M CS EPIDURAL  ELSIE      Birth Comments: failed  attempt      Name: Allan      Apgar1: 9  Apgar5: 10   2 Term 94 39w0d  3.827 kg (8 lb 7 oz) F CS EPIDURAL  ELSIE      Birth Comments: elective C/S for breech      Name: Sujey      Apgar1: 8  Apgar5: 9   1 SAB 93 14w0d    SAB   DEC      Birth Comments: NO D&C       Review of Systems   Constitutional: Negative for chills and fever.   HENT: Negative for congestion, ear pain, hearing loss and sore throat.    Eyes: Negative for pain and visual disturbance.   Respiratory: Negative for cough and shortness of breath.    Cardiovascular: Negative for chest pain, palpitations and peripheral edema.   Gastrointestinal: Negative for abdominal pain, constipation, diarrhea, heartburn, hematochezia and nausea.   Breasts:  Negative for tenderness, breast mass and discharge.   Genitourinary: Negative for dysuria, frequency, genital sores, hematuria, pelvic pain, urgency, vaginal bleeding and vaginal discharge.   Musculoskeletal: Negative for arthralgias, joint swelling and myalgias.   Skin: Negative for rash.   Neurological: Negative for dizziness, weakness, headaches and paresthesias.   Psychiatric/Behavioral: Negative for mood changes. The patient is not nervous/anxious.           OBJECTIVE:   /78   Pulse 84   Temp 97.9  F (36.6  C)   Ht 1.626 m (5' 4\")   Wt 65.3 kg (144 lb)   LMP 2020   SpO2 100%   BMI 24.72 kg/m    Physical Exam:   GENERAL " APPEARANCE: healthy, alert and no distress  EYES: Eyes grossly normal to inspection, PERRL and conjunctivae and sclerae normal  HENT: ear canals and TM's normal, nose and mouth without ulcers or lesions, oropharynx clear and oral mucous membranes moist  NECK: no adenopathy, no asymmetry, masses, or scars and thyroid normal to palpation  RESP: lungs clear to auscultation - no rales, rhonchi or wheezes  CV: regular rate and rhythm, normal S1 S2, no S3 or S4, no murmur, click or rub, no peripheral edema and peripheral pulses strong  ABDOMEN: soft, nontender, no hepatosplenomegaly, no masses and bowel sounds normal  MS: no musculoskeletal defects are noted and gait is age appropriate without ataxia  SKIN: no suspicious lesions or rashes  NEURO: Normal strength and tone, sensory exam grossly normal, mentation intact and speech normal  PSYCH: mentation appears normal and affect normal/bright    Breast and pelvic exams to be done by Dr. Hennessy  - upcoming.     Diagnostic Test Results:  Labs reviewed in [facility]    Recent Labs   Lab Test 12/16/20  0906 03/01/18  1447 01/06/17  1028 10/23/15  1041   CHOL 197 167   < > 212*   HDL 63 48*   < > 54   * 99   < > 137*   TRIG 77 100   < > 105   CHOLHDLRATIO  --   --   --  3.9    < > = values in this interval not displayed.        ASSESSMENT/PLAN:       ICD-10-CM    1. Encounter for routine adult medical exam with abnormal findings  Z00.01    2. Essential hypertension with goal blood pressure less than 140/90  I10 CBC with platelets     TSH with free T4 reflex     Albumin Random Urine Quantitative with Creat Ratio     candesartan (ATACAND) 8 MG tablet     Comprehensive metabolic panel (BMP + Alb, Alk Phos, ALT, AST, Total. Bili, TP)   3. Menopause  Z78.0 progesterone (PROMETRIUM) 100 MG capsule     estradiol (ESTRACE) 1 MG tablet   4. CARDIOVASCULAR SCREENING; LDL GOAL LESS THAN 160  Z13.6 Lipid panel reflex to direct LDL Fasting   5. Vitamin D deficiency  E55.9        Patient has  "been advised of split billing requirements and indicates understanding: Yes    COUNSELING:  Reviewed preventive health counseling, as reflected in patient instructions       Estimated body mass index is 24.72 kg/m  as calculated from the following:    Height as of this encounter: 1.626 m (5' 4\").    Weight as of this encounter: 65.3 kg (144 lb).    Pt's mother did genetic testing for her hx of ovarian cancer.    She had bladder cancer and gallbladder cancer.   She reports that she has never smoked. She has never used smokeless tobacco.      Counseling Resources:  ATP IV Guidelines  Pooled Cohorts Equation Calculator  Breast Cancer Risk Calculator  BRCA-Related Cancer Risk Assessment: FHS-7 Tool  FRAX Risk Assessment  ICSI Preventive Guidelines  Dietary Guidelines for Americans, 2010  USDA's MyPlate  ASA Prophylaxis  Lung CA Screening           Patti Purcell MD  [facility]  "

## 2022-03-06 VITALS
SYSTOLIC BLOOD PRESSURE: 128 MMHG | DIASTOLIC BLOOD PRESSURE: 78 MMHG | BODY MASS INDEX: 24.59 KG/M2 | OXYGEN SATURATION: 100 % | WEIGHT: 144 LBS | HEART RATE: 84 BPM | TEMPERATURE: 97.9 F | HEIGHT: 64 IN

## 2022-03-06 PROBLEM — Z78.0 MENOPAUSE: Status: ACTIVE | Noted: 2022-03-06

## 2022-03-11 ENCOUNTER — ANCILLARY PROCEDURE (OUTPATIENT)
Dept: GENERAL RADIOLOGY | Facility: CLINIC | Age: 52
End: 2022-03-11
Attending: STUDENT IN AN ORGANIZED HEALTH CARE EDUCATION/TRAINING PROGRAM
Payer: COMMERCIAL

## 2022-03-11 ENCOUNTER — OFFICE VISIT (OUTPATIENT)
Dept: ORTHOPEDICS | Facility: CLINIC | Age: 52
End: 2022-03-11
Payer: COMMERCIAL

## 2022-03-11 VITALS
SYSTOLIC BLOOD PRESSURE: 104 MMHG | DIASTOLIC BLOOD PRESSURE: 72 MMHG | HEIGHT: 64 IN | BODY MASS INDEX: 23.9 KG/M2 | WEIGHT: 140 LBS

## 2022-03-11 DIAGNOSIS — G89.29 CHRONIC LEFT SHOULDER PAIN: Primary | ICD-10-CM

## 2022-03-11 DIAGNOSIS — M75.42 INTERNAL IMPINGEMENT OF LEFT SHOULDER: ICD-10-CM

## 2022-03-11 DIAGNOSIS — M25.512 CHRONIC LEFT SHOULDER PAIN: ICD-10-CM

## 2022-03-11 DIAGNOSIS — G89.29 CHRONIC LEFT SHOULDER PAIN: ICD-10-CM

## 2022-03-11 DIAGNOSIS — M25.512 CHRONIC LEFT SHOULDER PAIN: Primary | ICD-10-CM

## 2022-03-11 PROCEDURE — 73030 X-RAY EXAM OF SHOULDER: CPT | Mod: LT | Performed by: RADIOLOGY

## 2022-03-11 PROCEDURE — 99204 OFFICE O/P NEW MOD 45 MIN: CPT | Performed by: STUDENT IN AN ORGANIZED HEALTH CARE EDUCATION/TRAINING PROGRAM

## 2022-03-11 NOTE — PROGRESS NOTES
ASSESSMENT & PLAN    1. Chronic left shoulder pain    2. Internal impingement of left shoulder      Evita Carson is a 51 year old right hand dominant female presenting for evaluation of left posterior shoulder pain x 6 months without acute injury. History, examination and imaging are most consistent with internal/posterior shoulder impingement and/or possible posterior labral tear. Examination is reassuring against significant rotator cuff injury or adhesive capsulitis. No sensory loss or atrophy to suggest brachial neuritis or nerve impingement. We reviewed treatment plan inclusive of pain management (topicals, NSAIDS, steroid injection), activity modification (avoiding painful activities), formal physical therapy and timing of advance imaging (ie MRI).      At this time, plan to proceed with the following:  - Physical therapy - exercises to emphasis posterior capsule stretching, pain free range of motion of the shoulder, scapular stabilization, strengthening of the periscapular, rotator cuff, and deltoid muscles, with progression to functional rehabilitation with home exercise prescription.  - If unable to progress with PT secondary to pain,, then return for consideration of steroid injection.   - If shoulder pain is not improved / improving throughout course of PT, return for re-evaluation / consideration for advanced imaging.    Please schedule a follow up appointment to see me in 6 weeks, or sooner as needed for persistence or worsening of pain. You may call our direct clinic number ([phone]) at any time with questions or concerns.    Rosalie Mead MD, [facility]    -----    SUBJECTIVE  Evita Carson is a/an 51 year old Right handed female who is seen as a self referral for evaluation of left shoulder pain. The patient is seen by themselves.    Onset: 6 month(s) ago. Reports insidious onset without acute precipitating event or change in activity. She is  generally active and works out regularly but cannot recall a specific injury or change in routine.  Location of Pain: Left posterior shoulder. The pain is sharp in character and is provoked by activity, most pronounced with the shoulder in maximal abduction and external rotation (ie while swimming, weight lifting, reaching out to the side). She has not noticed shoulder instability, mechanical symptoms, clunking or weakness. She has been managing the pain with activity modifications but has not noticed any improvement. Continues to be quite limited by the activity-related pain. Goal is to return to swimming and work outs.   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 Enjoys swimming, strength training, various activities.    Past Medical History:   Diagnosis Date     Calculus of kidney 7/1996    Calcium stone - etiology after w/u was excess calcium intake     Fam hx-cardiovas dis NEC     father with MI at age 60 yo, s/p PTCA     Family history of ovarian cancer - following with Dr. Stuart - ? BSO in late 40's       Fertility testing 94-01    used lifetime total of 11 months of Clomid and 2 months of gonadotropins     Hypertension 2017     Volvulus (H) child    Resolved without surgery - hospitalized for 1 day     Social History     Socioeconomic History     Marital status:      Spouse name: Juan     Number of children: 4     Years of education: 20     Highest education level: Not on file   Occupational History     Occupation: physician     Employer: [employer]     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 [place]  "   Tobacco Use     Smoking status: Never Smoker     Smokeless tobacco: Never Used   Substance and Sexual Activity     Alcohol use: Yes     Alcohol/week: 0.0 standard drinks     Comment: 0-1 wine cooler a week     Drug use: No     Sexual activity: Yes     Partners: Male     Birth control/protection: Pill   Other Topics Concern      Service No     Blood Transfusions No     Caffeine Concern No     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 No   Social History Narrative     Not on file     Social Determinants of Health     Financial Resource Strain: Not on file   Food Insecurity: Not on file   Transportation Needs: Not on file   Physical Activity: Not on file   Stress: Not on file   Social Connections: Not on file   Intimate Partner Violence: Not on file   Housing Stability: Not on file         Patient's past medical, surgical, social, and family histories were reviewed today and no changes are noted.    REVIEW OF SYSTEMS:  10 point ROS is negative other than symptoms noted above in HPI, Past Medical History or as stated below  Constitutional: NEGATIVE for fever, chills, change in weight  Skin: NEGATIVE for worrisome rashes, moles or lesions  GI/: NEGATIVE for bowel or bladder changes  Neuro: NEGATIVE for weakness, dizziness or paresthesias    OBJECTIVE:  /72   Ht 1.626 m (5' 4\")   Wt 63.5 kg (140 lb)   LMP 08/19/2020   BMI 24.03 kg/m     General: healthy, alert and in no distress  HEENT: no scleral icterus or conjunctival erythema  Skin: no suspicious lesions or rash. No jaundice.  CV: regular rhythm by palpation  Resp: normal respiratory effort without conversational dyspnea   Psych: normal mood and affect  Gait: normal steady " gait with appropriate coordination and balance  Neuro: normal light touch sensory exam of the bilateral upper extremities.    MSK:  LEFT SHOULDER  Inspection:    No swelling, bruising, discoloration, scapular winging, obvious deformity or atrophy    Slightly forward and rounded shoulders posture     Palpation:    Tender about the posterior capsule an posterior joint line. Remainder of bony and tendinous landmarks are nontender.  Active Range of Motion:     Abduction 1800 (painful end-range), FF 1800, , IR T10.      Scapular dyskinesis absent  Strength:    Scapular plane abduction 5/5 (painfree),  ER at neutral 5/5 (+posterior shoulder pain), ER at 900 abduction 5/5 (+posterior shoulder pain), IR 5/5, biceps 5/5  Special Tests:    Positive: Maximal pain with abduction and external rotation, posterior load and shift    Negative: Neer's, Azar', supraspinatus (empty can), drop arm/painful arc, Bucks's, lift-off, apprehension/relocation, posterior apprehension, sulcus sign, Speed's and Yergason's    Independent visualization of the below image:  Recent Results (from the past 24 hour(s))   XR Shoulder Left G/E 3 Views        LEFT SHOULDER THREE OR MORE VIEWS   3/11/2022 10:46 AM         IMPRESSION: Normal.       Rosalie Mead MD, [facility]

## 2022-03-11 NOTE — PATIENT INSTRUCTIONS
1. Chronic left shoulder pain    2. Internal impingement of left shoulder      Evita Carson is a 51 year old female presenting for evaluation of left posterior shoulder pain x 6 months without acute injury. History, examination and imaging are most consistent with internal/posterior shoulder impingement and/or possible posterior labral tear. We reviewed treatment plan inclusive of pain management (topicals, NSAIDS, steroid injection), activity modification (avoiding painful activities), formal physical therapy and timing of advance imaging (ie MRI).      At this time, plan to proceed with the following:  - Physical therapy - exercises to emphasis posterior capsule stretching, pain free range of motion of the shoulder, scapular stabilization, strengthening of the periscapular, rotator cuff, and deltoid muscles, with progression to functional rehabilitation with home exercise prescription.  - If unable to progress with PT secondary to pain,, then return for consideration of steroid injection.   - If shoulder pain is not improved / improving throughout course of PT, return for re-evaluation / consideration for advanced imaging.    Please schedule a follow up appointment to see me in 6 weeks, or sooner as needed for persistence or worsening of pain. You may call our direct clinic number ([phone]) at any time with questions or concerns.    Rosalie Mead MD, [facility]

## 2022-03-11 NOTE — LETTER
3/11/2022         RE: Evita Carson  [address]  [facility] [address]        Dear Colleague,    Thank you for referring your patient, Evita Carson, to the [facility] [place]. Please see a copy of my visit note below.    ASSESSMENT & PLAN    1. Chronic left shoulder pain    2. Internal impingement of left shoulder      Evita Carson is a 51 year old right hand dominant female presenting for evaluation of left posterior shoulder pain x 6 months without acute injury. History, examination and imaging are most consistent with internal/posterior shoulder impingement and/or possible posterior labral tear. Examination is reassuring against significant rotator cuff injury or adhesive capsulitis. No sensory loss or atrophy to suggest brachial neuritis or nerve impingement. We reviewed treatment plan inclusive of pain management (topicals, NSAIDS, steroid injection), activity modification (avoiding painful activities), formal physical therapy and timing of advance imaging (ie MRI).      At this time, plan to proceed with the following:  - Physical therapy - exercises to emphasis posterior capsule stretching, pain free range of motion of the shoulder, scapular stabilization, strengthening of the periscapular, rotator cuff, and deltoid muscles, with progression to functional rehabilitation with home exercise prescription.  - If unable to progress with PT secondary to pain,, then return for consideration of steroid injection.   - If shoulder pain is not improved / improving throughout course of PT, return for re-evaluation / consideration for advanced imaging.    Please schedule a follow up appointment to see me in 6 weeks, or sooner as needed for persistence or worsening of pain. You may call our direct clinic number ([phone]) at any time with questions or concerns.    Rosalie Mead MD, [facility]    -----    SUBJECTIVE  Evita PEDERSON  Yamileth is a/an 51 year old Right handed female who is seen as a self referral for evaluation of left shoulder pain. The patient is seen by themselves.    Onset: 6 month(s) ago. Reports insidious onset without acute precipitating event or change in activity. She is generally active and works out regularly but cannot recall a specific injury or change in routine.  Location of Pain: Left posterior shoulder. The pain is sharp in character and is provoked by activity, most pronounced with the shoulder in maximal abduction and external rotation (ie while swimming, weight lifting, reaching out to the side). She has not noticed shoulder instability, mechanical symptoms, clunking or weakness. She has been managing the pain with activity modifications but has not noticed any improvement. Continues to be quite limited by the activity-related pain. Goal is to return to swimming and work outs.   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    Past Medical History:   Diagnosis Date     Calculus of kidney 7/1996    Calcium stone - etiology after w/u was excess calcium intake     Fam hx-cardiovas dis NEC     father with MI at age 60 yo, s/p PTCA     Family history of ovarian cancer - following with Dr. Stuart - ? 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 [place]    Tobacco Use     Smoking status: Never Smoker     Smokeless tobacco: Never Used   Substance and Sexual Activity     Alcohol use: Yes     Alcohol/week: 0.0 standard drinks     Comment: 0-1 wine cooler a week     Drug use: No     Sexual activity: Yes     Partners: Male     Birth control/protection: Pill   Other Topics Concern      Service No     Blood Transfusions No     Caffeine Concern No     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 No   Social History Narrative     Not on file     Social Determinants of Health     Financial Resource Strain: Not on file   Food Insecurity: Not on file   Transportation Needs: Not on file   Physical Activity: Not on file   Stress: Not on file   Social Connections: Not on file   Intimate Partner Violence: Not on file   Housing Stability: Not on file         Patient's past medical, surgical, social, and family histories were reviewed today and no changes are noted.    REVIEW OF SYSTEMS:  10 point ROS is negative other than symptoms noted above in HPI, Past Medical History or as stated below  Constitutional: NEGATIVE for fever, chills, change in weight  Skin: NEGATIVE for worrisome rashes, moles or lesions  GI/: NEGATIVE for bowel or bladder changes  Neuro: NEGATIVE for weakness, dizziness or paresthesias    OBJECTIVE:  /72   Ht 1.626 m (5' 4\")   Wt 63.5 kg (140 lb)   LMP 08/19/2020   BMI 24.03 kg/m     General: healthy, alert and " in no distress  HEENT: no scleral icterus or conjunctival erythema  Skin: no suspicious lesions or rash. No jaundice.  CV: regular rhythm by palpation  Resp: normal respiratory effort without conversational dyspnea   Psych: normal mood and affect  Gait: normal steady gait with appropriate coordination and balance  Neuro: normal light touch sensory exam of the bilateral upper extremities.    MSK:  LEFT SHOULDER  Inspection:    No swelling, bruising, discoloration, scapular winging, obvious deformity or atrophy    Slightly forward and rounded shoulders posture     Palpation:    Tender about the posterior capsule an posterior joint line. Remainder of bony and tendinous landmarks are nontender.  Active Range of Motion:     Abduction 1800 (painful end-range), FF 1800, , IR T10.      Scapular dyskinesis absent  Strength:    Scapular plane abduction 5/5 (painfree),  ER at neutral 5/5 (+posterior shoulder pain), ER at 900 abduction 5/5 (+posterior shoulder pain), IR 5/5, biceps 5/5  Special Tests:    Positive: Maximal pain with abduction and external rotation, posterior load and shift    Negative: Neer's, Azar', supraspinatus (empty can), drop arm/painful arc, Crawford's, lift-off, apprehension/relocation, posterior apprehension, sulcus sign, Speed's and Yergason's    Independent visualization of the below image:  Recent Results (from the past 24 hour(s))   XR Shoulder Left G/E 3 Views        LEFT SHOULDER THREE OR MORE VIEWS   3/11/2022 10:46 AM         IMPRESSION: Normal.       Rosalie Meda MD, [facility]        Again, thank you for allowing me to participate in the care of your patient.        Sincerely,        Rosalie Mead MD

## 2022-03-29 ENCOUNTER — THERAPY VISIT (OUTPATIENT)
Dept: PHYSICAL THERAPY | Facility: CLINIC | Age: 52
End: 2022-03-29
Payer: COMMERCIAL

## 2022-03-29 DIAGNOSIS — G89.29 CHRONIC LEFT SHOULDER PAIN: ICD-10-CM

## 2022-03-29 DIAGNOSIS — M75.42 INTERNAL IMPINGEMENT OF LEFT SHOULDER: ICD-10-CM

## 2022-03-29 DIAGNOSIS — M25.512 CHRONIC LEFT SHOULDER PAIN: ICD-10-CM

## 2022-03-29 PROCEDURE — 97110 THERAPEUTIC EXERCISES: CPT | Mod: GP | Performed by: PHYSICAL THERAPIST

## 2022-03-29 PROCEDURE — 97112 NEUROMUSCULAR REEDUCATION: CPT | Mod: GP | Performed by: PHYSICAL THERAPIST

## 2022-03-29 PROCEDURE — 97161 PT EVAL LOW COMPLEX 20 MIN: CPT | Mod: GP | Performed by: PHYSICAL THERAPIST

## 2022-03-29 NOTE — PROGRESS NOTES
Physical Therapy Initial Evaluation  Subjective:    Patient Health History  Evita Carson being seen for Left shoulder impingement.     Problem began: 8/2/2021.   Problem occurred: No idea   Pain is reported as 1/10 on pain scale.  General health as reported by patient is excellent.       Medical allergies: none.   Surgeries include:  Other. Other surgery history details: C section times 4.    Current medications:  High blood pressure medication and hormone replacement therapy.       Primary job tasks include:  Repetitive tasks.                Physical Therapy Initial Evaluation   3/29/2022   Precautions/Restrictions/MD instructions: PT eval and treat    Therapist Impression:   Pt is a 53 y/o female, with 6 month history of left shoulder pain. Pt presents with pain, decreased ROM, poor posture and decreased strength consistent with internal impingement . These impairments limit their ability to swim, dressing/bathing; reaching behind back. Skilled PT services necessary in order to reduce impairments and improve independent function.    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  Job duties: [employer], computer, standing   Current HEP/exercise regimen: elliptical, biking, swimmer, hand weights  Patient's goals: painfree shoulder  Medications: HTN, hormone replacements   General health as reported by patient: good   Return to MD: as needed   Red Flags: none                         Objective:  SHOULDER:    Standing Posture: slight forward head, rounded shoulders      Shoulder: (* indicates patient's pain)   PROM L PROM R AROM L AROM R MMT L MMT R   Flex 170 170 143* 150 4/5 5/5   Abd   160* 175     IR 60* 75   5/5 5/5    110 65 75 4-/5 5/5   Ext/IR   T9 T8       Palpation: tender to palpation over supraspinatus tendon and infraspinatus muscle      System    Physical Exam    General     ROS    Assessment/Plan:    Patient is a 52 year old female with left side shoulder complaints.    Patient has the following significant findings with corresponding treatment plan.                Diagnosis 1:  Left shoulder pain  Pain -  hot/cold therapy, manual therapy, self management, education and home program  Decreased ROM/flexibility - manual therapy and therapeutic exercise  Decreased joint mobility - manual therapy and therapeutic exercise  Decreased strength - therapeutic exercise and therapeutic activities  Inflammation - cold therapy and self management/home program  Impaired muscle performance - neuro re-education  Decreased function - therapeutic activities  Impaired posture - neuro re-education  Instability -  Therapeutic Activity  Therapeutic Exercise    Therapy Evaluation Codes:     Cumulative Therapy Evaluation is: Low complexity.    Previous and current functional limitations:  (See Goal Flow Sheet for this information)    Short term and Long term goals: (See Goal Flow Sheet for this information)     Communication ability:  Patient appears to be able to clearly communicate and understand verbal and written communication and follow directions correctly.  Treatment Explanation - The following has been discussed with the patient:   RX ordered/plan of care  Anticipated outcomes  Possible risks and side effects  This patient would benefit from PT intervention to resume normal activities.   Rehab potential is good.    Frequency:  1 X week, once daily  Duration:  for 6 weeks  Discharge Plan:   Achieve all LTG.  Independent in home treatment program.  Reach maximal therapeutic benefit.    Please refer to the daily flowsheet for treatment today, total treatment time and time spent performing 1:1 timed codes.

## 2022-04-05 ENCOUNTER — THERAPY VISIT (OUTPATIENT)
Dept: PHYSICAL THERAPY | Facility: CLINIC | Age: 52
End: 2022-04-05
Payer: COMMERCIAL

## 2022-04-05 DIAGNOSIS — G89.29 CHRONIC LEFT SHOULDER PAIN: Primary | ICD-10-CM

## 2022-04-05 DIAGNOSIS — M25.512 CHRONIC LEFT SHOULDER PAIN: Primary | ICD-10-CM

## 2022-04-05 DIAGNOSIS — M75.42 INTERNAL IMPINGEMENT OF LEFT SHOULDER: ICD-10-CM

## 2022-04-05 PROCEDURE — 97110 THERAPEUTIC EXERCISES: CPT | Mod: GP | Performed by: PHYSICAL THERAPIST

## 2022-04-05 PROCEDURE — 97112 NEUROMUSCULAR REEDUCATION: CPT | Mod: GP | Performed by: PHYSICAL THERAPIST

## 2022-04-12 ENCOUNTER — THERAPY VISIT (OUTPATIENT)
Dept: PHYSICAL THERAPY | Facility: CLINIC | Age: 52
End: 2022-04-12
Payer: COMMERCIAL

## 2022-04-12 DIAGNOSIS — G89.29 CHRONIC LEFT SHOULDER PAIN: Primary | ICD-10-CM

## 2022-04-12 DIAGNOSIS — M25.512 CHRONIC LEFT SHOULDER PAIN: Primary | ICD-10-CM

## 2022-04-12 DIAGNOSIS — M75.42 INTERNAL IMPINGEMENT OF LEFT SHOULDER: ICD-10-CM

## 2022-04-12 PROCEDURE — 97110 THERAPEUTIC EXERCISES: CPT | Mod: GP | Performed by: PHYSICAL THERAPIST

## 2022-04-12 PROCEDURE — 97112 NEUROMUSCULAR REEDUCATION: CPT | Mod: GP | Performed by: PHYSICAL THERAPIST

## 2022-06-20 ENCOUNTER — OFFICE VISIT (OUTPATIENT)
Dept: FAMILY MEDICINE | Facility: CLINIC | Age: 52
End: 2022-06-20
Payer: COMMERCIAL

## 2022-06-20 VITALS
RESPIRATION RATE: 16 BRPM | BODY MASS INDEX: 23.38 KG/M2 | HEART RATE: 77 BPM | DIASTOLIC BLOOD PRESSURE: 86 MMHG | OXYGEN SATURATION: 100 % | SYSTOLIC BLOOD PRESSURE: 120 MMHG | TEMPERATURE: 97.7 F | WEIGHT: 136.2 LBS

## 2022-06-20 DIAGNOSIS — Z13.6 CARDIOVASCULAR SCREENING; LDL GOAL LESS THAN 160: ICD-10-CM

## 2022-06-20 DIAGNOSIS — Z71.84 ENCOUNTER FOR COUNSELING FOR TRAVEL: Primary | ICD-10-CM

## 2022-06-20 DIAGNOSIS — I10 ESSENTIAL HYPERTENSION WITH GOAL BLOOD PRESSURE LESS THAN 140/90: ICD-10-CM

## 2022-06-20 LAB
ALBUMIN SERPL-MCNC: 3.6 G/DL (ref 3.4–5)
ALP SERPL-CCNC: 60 U/L (ref 40–150)
ALT SERPL W P-5'-P-CCNC: 18 U/L (ref 0–50)
ANION GAP SERPL CALCULATED.3IONS-SCNC: 6 MMOL/L (ref 3–14)
AST SERPL W P-5'-P-CCNC: 18 U/L (ref 0–45)
BILIRUB SERPL-MCNC: 0.4 MG/DL (ref 0.2–1.3)
BUN SERPL-MCNC: 17 MG/DL (ref 7–30)
CALCIUM SERPL-MCNC: 8.8 MG/DL (ref 8.5–10.1)
CHLORIDE BLD-SCNC: 107 MMOL/L (ref 94–109)
CHOLEST SERPL-MCNC: 172 MG/DL
CO2 SERPL-SCNC: 27 MMOL/L (ref 20–32)
CREAT SERPL-MCNC: 0.85 MG/DL (ref 0.52–1.04)
CREAT UR-MCNC: 188 MG/DL
ERYTHROCYTE [DISTWIDTH] IN BLOOD BY AUTOMATED COUNT: 12 % (ref 10–15)
FASTING STATUS PATIENT QL REPORTED: YES
GFR SERPL CREATININE-BSD FRML MDRD: 81 ML/MIN/1.73M2
GLUCOSE BLD-MCNC: 92 MG/DL (ref 70–99)
HCT VFR BLD AUTO: 40 % (ref 35–47)
HDLC SERPL-MCNC: 62 MG/DL
HGB BLD-MCNC: 13.7 G/DL (ref 11.7–15.7)
LDLC SERPL CALC-MCNC: 93 MG/DL
MCH RBC QN AUTO: 31.8 PG (ref 26.5–33)
MCHC RBC AUTO-ENTMCNC: 34.3 G/DL (ref 31.5–36.5)
MCV RBC AUTO: 93 FL (ref 78–100)
MICROALBUMIN UR-MCNC: 7 MG/L
MICROALBUMIN/CREAT UR: 3.72 MG/G CR (ref 0–25)
NONHDLC SERPL-MCNC: 110 MG/DL
PLATELET # BLD AUTO: 198 10E3/UL (ref 150–450)
POTASSIUM BLD-SCNC: 3.7 MMOL/L (ref 3.4–5.3)
PROT SERPL-MCNC: 7.1 G/DL (ref 6.8–8.8)
RBC # BLD AUTO: 4.31 10E6/UL (ref 3.8–5.2)
SODIUM SERPL-SCNC: 140 MMOL/L (ref 133–144)
TRIGL SERPL-MCNC: 86 MG/DL
TSH SERPL DL<=0.005 MIU/L-ACNC: 2.47 MU/L (ref 0.4–4)
WBC # BLD AUTO: 4.9 10E3/UL (ref 4–11)

## 2022-06-20 PROCEDURE — 99401 PREV MED CNSL INDIV APPRX 15: CPT | Performed by: PHYSICIAN ASSISTANT

## 2022-06-20 PROCEDURE — 80053 COMPREHEN METABOLIC PANEL: CPT | Performed by: PHYSICIAN ASSISTANT

## 2022-06-20 PROCEDURE — 82043 UR ALBUMIN QUANTITATIVE: CPT | Performed by: PHYSICIAN ASSISTANT

## 2022-06-20 PROCEDURE — 84443 ASSAY THYROID STIM HORMONE: CPT | Performed by: PHYSICIAN ASSISTANT

## 2022-06-20 PROCEDURE — 36415 COLL VENOUS BLD VENIPUNCTURE: CPT | Performed by: PHYSICIAN ASSISTANT

## 2022-06-20 PROCEDURE — 80061 LIPID PANEL: CPT | Performed by: PHYSICIAN ASSISTANT

## 2022-06-20 PROCEDURE — 85027 COMPLETE CBC AUTOMATED: CPT | Performed by: PHYSICIAN ASSISTANT

## 2022-06-20 RX ORDER — ATOVAQUONE AND PROGUANIL HYDROCHLORIDE 250; 100 MG/1; MG/1
1 TABLET, FILM COATED ORAL DAILY
Qty: 28 TABLET | Refills: 0 | Status: SHIPPED | OUTPATIENT
Start: 2022-06-20 | End: 2022-07-18

## 2022-06-20 RX ORDER — AZITHROMYCIN 500 MG/1
TABLET, FILM COATED ORAL
Qty: 3 TABLET | Refills: 0 | Status: SHIPPED | OUTPATIENT
Start: 2022-06-20 | End: 2022-09-27

## 2022-06-20 NOTE — PROGRESS NOTES
SUBJECTIVE: Evita Carson , a 52 year old  female, presents for counseling and information regarding upcoming travel to [facility]. Special medical concerns include: none. She anticipates the following unusual exposures: none.    Itinerary:  [facility]    Departure Date: 08/12/2022 Return date: 08/20/2022    Reason for travel (i.e. Business, pleasure): Pleasure     Visiting an urban or rural area?: Both     Accommodations (i.e. hotel, hostel, friends, family, etc): [place], hotels     Women - First day of your last period: 06/06/2022    IMMUNIZATION HISTORY  Have you received any vaccinations in the past 4 weeks?  No  Have you ever fainted from having your blood drawn or from an injection?  Yes  Have you ever had a fever reaction to vaccination?  No  Have you ever had any bad reaction or side effect from any vaccination?  No  Have you ever had hepatitis A or B vaccine?  Yes  Do you live (or work closely) with anyone who has AIDS, an AIDS-like condition, any other immune disorder or who is on chemotherapy for cancer?  No  Have you received any injection of immune globulin or any blood products during the past 12 months?  No    GENERAL MEDICAL HISTORY  Do you have a medical condition that warrants maintenance medication or physician follow-up?  Yes  Do you have a medical condition that is stable now, but that may recur while traveling?  No  Has your spleen been removed?  No  Have you had an acute illness or a fever in the past 48 hours?  No  Are you pregnant, or might you become pregnant on this trip?  Any chance of pregnancy?  No  Are you breastfeeding?  No  Do you have HIV, AIDS, an AIDS-like condition, any other immune disorder, leukemia or cancer?  No  Do you have a severe combined immunodeficiency disease?  No  Have you had your thymus gland removed or history of problems with your thymus, such as myasthenia gravis, DiGeorge syndrome, or thymoma?  No    Do you have severe thrombocytopenia (low platelet count)  or a coagulation disorder?  No  Have you ever had a convulsion, seizure, epilepsy, neurologic condition or brain infection?  No  Do you have any stomach conditions?  No  Do you have a G6PD deficiency?  No  Do you have severe renal or kidney impairment?  No  Do you have a history of psychiatric problems?  No  Do you have a problem with strange dreams and/or nightmares?  No  Do you have insomnia?  No  Do you have problems with vaginitis?  No  Do you have psoriasis?  No  Are you prone to motion sickness?  Yes  Have you ever had headaches, nausea, vomiting, or breathing problems from altitude exposure?  No      Past Medical History:   Diagnosis Date     Calculus of kidney 7/1996    Calcium stone - etiology after w/u was excess calcium intake     Fam hx-cardiovas dis NEC     father with MI at age 60 yo, s/p PTCA     Family history of ovarian cancer - following with Dr. Stuart - ? BSO in late 40's       Fertility testing 94-01    used lifetime total of 11 months of Clomid and 2 months of gonadotropins     Hypertension 2017     Volvulus (H) child    Resolved without surgery - hospitalized for 1 day      Immunization History   Administered Date(s) Administered     COVID-19,PF,Pfizer (12+ Yrs) 12/30/2020, 01/18/2021, 10/13/2021     Flu, Unspecified 10/14/2020     HEPA 06/18/2013, 09/16/2014     HepB 09/25/1991, 10/26/1991, 04/17/1992     Influenza (H1N1) 11/03/2009     Influenza (IIV3) PF 12/03/2003, 10/08/2004, 11/14/2007, 11/17/2008, 10/12/2009, 10/09/2019     Influenza Quad, Recombinant, pf(RIV4) (Flublok) 09/22/2021     Influenza Vaccine IM > 6 months Valent IIV4 (Alfuria,Fluzone) 10/01/2018     Influenza Vaccine, 6+MO IM (QUADRIVALENT W/PRESERVATIVES) 10/15/2017     Greenlandic Encephalitis IM 07/13/2018, 07/20/2018     MMR 06/21/1992     Mantoux Tuberculin Skin Test 05/05/2008, 03/23/2009     Rabies - IM Diploid Cell Culture 07/20/2018, 08/03/2018     Rabies - IM Fibroblast Culture 07/13/2018     TDAP Vaccine  (Boostrix) 10/08/2010     Tdap (Adacel,Boostrix) 11/16/2020     Typhoid Oral 09/16/2014       Current Outpatient Medications   Medication Sig Dispense Refill     candesartan (ATACAND) 8 MG tablet TAKE ONE TABLET BY MOUTH DAILY 90 tablet 0     Cholecalciferol (VITAMIN D) 2000 UNITS CAPS Take by mouth daily        Coenzyme Q10 (COQ10) 50 MG CAPS Take 70 mg by mouth daily       estradiol (ESTRACE) 1 MG tablet Take 1 tablet (1 mg) by mouth daily 90 tablet 3     GLUCOSAMINE 500 MG OR CAPS 1 tab BID  0     MULTI-VITAMIN OR TABS 1 TABLET DAILY 30 0     OMEGA-3 CAPS   OR 2 caps Twice daily,  0     PROBIOTICA OR CHEW take one tablet daily  0     progesterone (PROMETRIUM) 100 MG capsule Rotates 2 tabs daily for two weeks then  1 tab po daily  for 2 wks 126 capsule 3     Allergies   Allergen Reactions     No Known Drug Allergies         EXAM: deferred    Immunizations discussed include: none needed  Malaria prophylaxis recommended: Malarone  Symptomatic treatment for traveler's diarrhea: bismuth subsalicylate, loperamide/diphenoxylate and azithromycin    ASSESSMENT/PLAN:    (Z71.84) Encounter for counseling for travel  (primary encounter diagnosis)    Comment: No vaccines today. Patient will return or follow-up with PCP as needed. Prophylaxis given for Traveler's diarrhea and Malaria. All questions were answered.     Plan: atovaquone-proguanil (MALARONE) 250-100 MG         tablet, azithromycin (ZITHROMAX) 500 MG tablet              I have reviewed general recommendations for safe travel   including: food/water precautions, insect avoidance, safe sex   practices given high prevalence of HIV and other STDs,   roadway safety. Educational materials and links to the [facility]   Traveler's health website have been provided.    Total time 15 minutes, greater than 50 percent in counseling   and coordination of care.

## 2022-07-07 DIAGNOSIS — I10 ESSENTIAL HYPERTENSION WITH GOAL BLOOD PRESSURE LESS THAN 140/90: ICD-10-CM

## 2022-07-07 RX ORDER — CANDESARTAN 8 MG/1
TABLET ORAL
Qty: 90 TABLET | Refills: 0 | Status: SHIPPED | OUTPATIENT
Start: 2022-07-07 | End: 2022-10-05

## 2022-07-07 NOTE — TELEPHONE ENCOUNTER
Prescription approved per [facility] Refill Protocol.    Olive Andrews RN  [facility]

## 2022-07-18 DIAGNOSIS — Z71.84 ENCOUNTER FOR COUNSELING FOR TRAVEL: ICD-10-CM

## 2022-07-18 RX ORDER — ATOVAQUONE AND PROGUANIL HYDROCHLORIDE 250; 100 MG/1; MG/1
TABLET, FILM COATED ORAL
Qty: 28 TABLET | Refills: 0 | Status: SHIPPED | OUTPATIENT
Start: 2022-07-18 | End: 2022-09-27

## 2022-07-18 NOTE — TELEPHONE ENCOUNTER
Routing refill request to provider for review/approval because:  Drug not on the [facility] refill protocol     Nat Ventura RN on 7/18/2022 at 7:35 AM

## 2022-07-21 ENCOUNTER — HOSPITAL ENCOUNTER (OUTPATIENT)
Dept: MAMMOGRAPHY | Facility: CLINIC | Age: 52
Discharge: HOME | End: 2022-07-21
Attending: FAMILY MEDICINE | Admitting: FAMILY MEDICINE
Payer: COMMERCIAL

## 2022-07-21 DIAGNOSIS — Z12.31 VISIT FOR SCREENING MAMMOGRAM: ICD-10-CM

## 2022-07-21 PROCEDURE — 77067 SCR MAMMO BI INCL CAD: CPT

## 2022-09-27 ENCOUNTER — OFFICE VISIT (OUTPATIENT)
Dept: OBGYN | Facility: CLINIC | Age: 52
End: 2022-09-27
Payer: COMMERCIAL

## 2022-09-27 VITALS
HEART RATE: 74 BPM | SYSTOLIC BLOOD PRESSURE: 112 MMHG | WEIGHT: 136 LBS | DIASTOLIC BLOOD PRESSURE: 68 MMHG | BODY MASS INDEX: 23.22 KG/M2 | HEIGHT: 64 IN

## 2022-09-27 DIAGNOSIS — Z78.0 MENOPAUSE: Primary | ICD-10-CM

## 2022-09-27 PROCEDURE — 99213 OFFICE O/P EST LOW 20 MIN: CPT | Performed by: OBSTETRICS & GYNECOLOGY

## 2022-09-27 RX ORDER — ESTRADIOL 0.05 MG/D
1 PATCH TRANSDERMAL WEEKLY
Qty: 12 PATCH | Refills: 3 | Status: SHIPPED | OUTPATIENT
Start: 2022-09-27 | End: 2023-02-16 | Stop reason: ALTCHOICE

## 2022-09-27 NOTE — NURSING NOTE
"Chief Complaint   Patient presents with     Medication Follow-up     Discuss HRT.       Initial /68   Pulse 74   Ht 1.626 m (5' 4\")   Wt 61.7 kg (136 lb)   LMP 2020   Breastfeeding No   BMI 23.34 kg/m   Estimated body mass index is 23.34 kg/m  as calculated from the following:    Height as of this encounter: 1.626 m (5' 4\").    Weight as of this encounter: 61.7 kg (136 lb).  BP completed using cuff size: regular    Questioned patient about current smoking habits.  Pt. has never smoked.          The following HM Due: NONE      The following patient reported/Care Every where data was sent to:  P ABSTRACT QUALITY INITIATIVES [[id number]]  Norah Menard LPN             "

## 2022-09-29 NOTE — PROGRESS NOTES
SUBJECTIVE:                                                     Evita Carson is a 52 year old female  who presents to clinic today for discussion of perimenopause or menopause therapy.    Patient's last menstrual period was 2020.    Currently, good control of symptoms on estradiol 1 mg daily and cyclic progesterone, 200 mg days 1 through 14.  Interested in decreasing the estrogen dose, and also thinking about a transdermal option.  She is aware that there is a lower risk of blood clots with transdermal estrogen.  She is also hoping to decrease the dose.  At some point, she would be interested in trying daily progesterone again, although she had frequent breakthrough bleeding when she tried that in the past, and prefers regular withdrawal bleeding to unscheduled bleeding.  We discussed today thinking about dropping the dose and changing to a transdermal option, rather than doing all 3 things at 1 time.  Then if she does well with the change, we can consider decreasing progesterone to 100 mg but used on a daily continuous basis.  If she does well with this and would like to try a patch option that combines a progestin with the estradiol, we could try that as well.    Problem list and histories reviewed & adjusted, as indicated.  Additional history: as documented.    Patient Active Problem List   Diagnosis     Family history of ischemic heart disease - father's side of family - with normal lipids     Family history of ovarian cancer - following with Dr. Hennessy now - [facility] ob/gyn  - ? BSO in late 40's      CARDIOVASCULAR SCREENING; LDL GOAL LESS THAN 160     Essential hypertension with goal blood pressure less than 140/90     Chest pain, unspecified     Irritable bowel syndrome with diarrhea     Anisometropia     Vitamin D deficiency     Menopause     Past Surgical History:   Procedure Laterality Date     ABDOMINOPLASTY N/A 2019    Procedure: MINI ABDOMINOPLASTY;  Surgeon: Carolina Kitchen MD;   Location: [facility];  Service: Plastics      SECTION       COLONOSCOPY      Dr. Castro [facility]     COLONOSCOPY N/A 10/12/2018    Procedure: COLONOSCOPY;  COLONOSCOPY [fv]  ;  Surgeon: Alexander Castro MD;  Location:  GI     ESOPHAGOSCOPY, GASTROSCOPY, DUODENOSCOPY (EGD), COMBINED N/A 2019    Procedure: ESOPHAGOGASTRODUODENOSCOPY, WITH BIOPSY;  Surgeon: Alexander Castro MD;  Location:  GI     mini tummy [place]  2019     ZZC  DELIVERY ONLY  94,96,99,02    4 C/S      Social History     Tobacco Use     Smoking status: Never Smoker     Smokeless tobacco: Never Used   Substance Use Topics     Alcohol use: Yes     Alcohol/week: 0.0 standard drinks     Comment: 0-1 wine cooler a week      Problem (# of Occurrences) Relation (Name,Age of Onset)    Alzheimer Disease (1) Maternal Grandfather (Artur): in late 70's    Aneurysm (2) Paternal Half-Sister (uncle), Paternal Uncle    Bladder Cancer (1) Mother (Nola)    C.A.D. (2) Father (Pelon): MI at age 59 and stented - , Paternal Grandfather (Benjamin Pinedo): bypass in 70's    Cancer (1) Maternal Grandmother:  of ovarian cancer at age 50    Colon Cancer (1) Maternal Uncle: 3 maternal great-uncles with colon cancer     Coronary Artery Disease Early Onset (1) Other: father's side of family - late 50's with normal LDLs and BP's     Dementia (1) Father (Pelon): possible early dementia    Depression (1) Paternal Grandfather (Benjamin Pinedo): in old age    Gallbladder Disease (1) Mother (Nola): Gallbladder removed- with some dysplasia noted    Gastrointestinal Disease (2) Father (Pelon): gallstones and gallstone pancreatitis at age 61, Paternal Grandmother:  of gallstone pancreatitis at age 50    Genitourinary Problems (1) Father (Pelon): uric acid kidney stone    Hypertension (3) Mother (Nola), Father (Pelon), Maternal Grandfather (Artur)    Lipids (1) Father (Pelon): low HDL    Osteoporosis (1) Mother (Nola): Osteopenia     Respiratory (1) Paternal Grandfather (Benjamin Pinedo): sleep apnea            candesartan (ATACAND) 8 MG tablet, TAKE ONE TABLET BY MOUTH DAILY  Cholecalciferol (VITAMIN D) 2000 UNITS CAPS, Take by mouth daily   Coenzyme Q10 (COQ10) 50 MG CAPS, Take 70 mg by mouth daily  estradiol (ESTRACE) 1 MG tablet, Take 1 tablet (1 mg) by mouth daily  GLUCOSAMINE 500 MG OR CAPS, 1 tab BID  MULTI-VITAMIN OR TABS, 1 TABLET DAILY  OMEGA-3 CAPS   OR, 2 caps Twice daily,  PROBIOTICA OR CHEW, take one tablet daily  progesterone (PROMETRIUM) 100 MG capsule, Rotates 2 tabs daily for two weeks then  1 tab po daily  for 2 wks (Patient taking differently: 100 mg 2 Tabs daily on days 1-14 of cycle.)  sulfamethoxazole-trimethoprim (BACTRIM DS) 800-160 MG tablet, TAKE ONE TABLET BY MOUTH AFTER INTERCOURSE AS NEEDED    No current facility-administered medications on file prior to visit.    Allergies   Allergen Reactions     No Known Drug Allergies        ROS:  Negative except as noted in HPI.    OBJECTIVE:     Exam:  Constitutional:  Appearance: Well nourished, well developed alert, in no acute distress  Neurologic/Psychiatric:  Mental Status:  Oriented X3       In-Clinic Test Results:  No results found for this or any previous visit (from the past 24 hour(s)).    ASSESSMENT/PLAN:                                                        ICD-10-CM    1. Menopause  Z78.0 estradiol (CLIMARA) 0.05 MG/24HR weekly patch       As above, the plan will be to discontinue oral estradiol in favor of an estradiol patch at 0.05 mg daily.  She will continue to use oral progesterone as she is for the moment, and we can consider switching this over to daily progesterone at 100 mg daily if this change works well.  We also discussed transdermal patches which combined estradiol with a progestin, to eliminate the need for oral progesterone.  She could consider this as well.  She will let me know how she is doing with the current regimen, and if we need to make any  changes we can do that through "[employer]hart.    She does have a family history of ovarian cancer in her maternal grandmother.  We briefly discussed ovarian cancer screening.  She is aware that there are no good options for ovarian cancer screening at this point in time for women with a history similar to hers who are not BRCA1 or 2 positive.  I offered pelvic exam today, but she is having no symptoms and so we decided together that that was not necessary.  However, she is also aware that if she develops any new symptoms that could be attributed to an ovarian lesion, she should be seen for an exam and also scheduled for a pelvic ultrasound.  I am happy to see her for this at any time.    Sujey Hennessy MD  [facility]

## 2022-10-03 ENCOUNTER — TELEPHONE (OUTPATIENT)
Dept: FAMILY MEDICINE | Facility: CLINIC | Age: 52
End: 2022-10-03

## 2022-10-03 DIAGNOSIS — B02.9 HERPES ZOSTER WITHOUT COMPLICATION: Primary | ICD-10-CM

## 2022-10-03 RX ORDER — VALACYCLOVIR HYDROCHLORIDE 1 G/1
1000 TABLET, FILM COATED ORAL 3 TIMES DAILY
Qty: 21 TABLET | Refills: 0 | Status: SHIPPED | OUTPATIENT
Start: 2022-10-03 | End: 2022-12-06

## 2022-10-03 NOTE — TELEPHONE ENCOUNTER
Pt developed rash on the left abdomen, consistent with shingles, started 2 days ago, will start on valtrex 1 gm three times daily for 7 days.

## 2022-10-03 NOTE — TELEPHONE ENCOUNTER
Dr. Stahl   Please see request from patient     Rash on abdomen, concerned for shingles and requesting order for valtrex     Jill Parr, Registered Nurse  [facility]

## 2022-10-16 ENCOUNTER — HEALTH MAINTENANCE LETTER (OUTPATIENT)
Age: 52
End: 2022-10-16

## 2022-12-06 ENCOUNTER — TELEPHONE (OUTPATIENT)
Dept: FAMILY MEDICINE | Facility: CLINIC | Age: 52
End: 2022-12-06

## 2022-12-06 DIAGNOSIS — Z78.0 MENOPAUSE: ICD-10-CM

## 2022-12-06 RX ORDER — PROGESTERONE 100 MG/1
CAPSULE ORAL
Qty: 84 CAPSULE | Refills: 3 | Status: SHIPPED | OUTPATIENT
Start: 2022-12-06 | End: 2023-11-07

## 2022-12-06 NOTE — TELEPHONE ENCOUNTER
Need more clarification from patient on what she wants it to say and reason for change    Attempt # 1    Called # [phone]     Left a non detailed VM to call back at [phone] and ask for any available Triage Nurse.    PALLAVI CHU RN on 12/6/2022 at 1:17 PM   [facility]

## 2022-12-06 NOTE — TELEPHONE ENCOUNTER
Call back from patient. States the directions on the label were always wrong. The way she takes this is 2 tablets daily on days 1-14 and that is it. 84 per 90 day supply. States this is the way she has been taking it but she would like the directions to be corrected as she needs a refill. She mentioned to the pharmacy that the directions were wrong so now they wont fill it.     States she asked Dr. Hennessy to correct this previously but it was not corrected. Patient states she doesn't care which provider sends the prescription as long as it is corrected.    Prescription pended with updated directions.

## 2023-02-15 ASSESSMENT — ENCOUNTER SYMPTOMS
EYE PAIN: 0
PALPITATIONS: 0
HEMATURIA: 0
BREAST MASS: 0
ARTHRALGIAS: 0
HEADACHES: 0
SORE THROAT: 0
NERVOUS/ANXIOUS: 0
HEARTBURN: 0
FREQUENCY: 0
DIZZINESS: 0
CONSTIPATION: 0
SHORTNESS OF BREATH: 0
NAUSEA: 0
PARESTHESIAS: 0
JOINT SWELLING: 0
ABDOMINAL PAIN: 0
HEMATOCHEZIA: 0
DYSURIA: 0
WEAKNESS: 0
FEVER: 0
DIARRHEA: 0
MYALGIAS: 0
COUGH: 0
CHILLS: 0

## 2023-02-16 ENCOUNTER — OFFICE VISIT (OUTPATIENT)
Dept: FAMILY MEDICINE | Facility: CLINIC | Age: 53
End: 2023-02-16
Payer: COMMERCIAL

## 2023-02-16 VITALS
DIASTOLIC BLOOD PRESSURE: 70 MMHG | BODY MASS INDEX: 23.92 KG/M2 | HEART RATE: 90 BPM | HEIGHT: 64 IN | WEIGHT: 140.1 LBS | RESPIRATION RATE: 16 BRPM | SYSTOLIC BLOOD PRESSURE: 120 MMHG | TEMPERATURE: 98 F | OXYGEN SATURATION: 100 %

## 2023-02-16 DIAGNOSIS — N95.1 SYMPTOMATIC MENOPAUSAL OR FEMALE CLIMACTERIC STATES: ICD-10-CM

## 2023-02-16 DIAGNOSIS — Z12.31 VISIT FOR SCREENING MAMMOGRAM: ICD-10-CM

## 2023-02-16 DIAGNOSIS — N39.0 RECURRENT POSTCOITAL URINARY TRACT INFECTION: ICD-10-CM

## 2023-02-16 DIAGNOSIS — I10 ESSENTIAL HYPERTENSION WITH GOAL BLOOD PRESSURE LESS THAN 140/90: ICD-10-CM

## 2023-02-16 DIAGNOSIS — Z00.01 ENCOUNTER FOR ROUTINE ADULT HEALTH EXAMINATION WITH ABNORMAL FINDINGS: Primary | ICD-10-CM

## 2023-02-16 DIAGNOSIS — Z13.6 CARDIOVASCULAR SCREENING; LDL GOAL LESS THAN 130: ICD-10-CM

## 2023-02-16 PROCEDURE — 99396 PREV VISIT EST AGE 40-64: CPT | Mod: 25 | Performed by: FAMILY MEDICINE

## 2023-02-16 PROCEDURE — 99213 OFFICE O/P EST LOW 20 MIN: CPT | Mod: 25 | Performed by: FAMILY MEDICINE

## 2023-02-16 RX ORDER — SULFAMETHOXAZOLE/TRIMETHOPRIM 800-160 MG
TABLET ORAL
Qty: 30 TABLET | Refills: 1 | Status: SHIPPED | OUTPATIENT
Start: 2023-02-16 | End: 2023-05-01

## 2023-02-16 RX ORDER — CANDESARTAN 8 MG/1
8 TABLET ORAL DAILY
Qty: 90 TABLET | Refills: 1 | Status: SHIPPED | OUTPATIENT
Start: 2023-02-16 | End: 2023-10-03

## 2023-02-16 RX ORDER — ESTRADIOL 0.5 MG/1
0.5 TABLET ORAL DAILY
Qty: 90 TABLET | Refills: 3 | Status: SHIPPED | OUTPATIENT
Start: 2023-02-16 | End: 2023-11-07

## 2023-02-16 ASSESSMENT — ENCOUNTER SYMPTOMS
CONSTIPATION: 0
HEMATURIA: 0
MYALGIAS: 0
JOINT SWELLING: 0
DIZZINESS: 0
PALPITATIONS: 0
ABDOMINAL PAIN: 0
EYE PAIN: 0
WEAKNESS: 0
PARESTHESIAS: 0
NERVOUS/ANXIOUS: 0
HEARTBURN: 0
HEADACHES: 0
BREAST MASS: 0
SHORTNESS OF BREATH: 0
HEMATOCHEZIA: 0
FEVER: 0
DYSURIA: 0
SORE THROAT: 0
ARTHRALGIAS: 0
NAUSEA: 0
COUGH: 0
DIARRHEA: 0
CHILLS: 0
FREQUENCY: 0

## 2023-02-16 NOTE — PATIENT INSTRUCTIONS
[facility]  [address] [place] [address]  Office: [phone]   Fax:    [phone]       To prevent and/or treat mild side effects (body aches, chills, fever, fatigue) -not related to allergies - after receiving COVID-19 novel coronavirus vaccine or other vaccines :     Go into your shots very well hydrated and stay well hydrated for 3-4 days afterwards.     Don't take the Ibuprofen prior to your shot, but rather afterward.  No prescription steroids within 2 weeks of the shots as they can suppress your immune system, but Ibuprofen is not a significant immunosuppressant at the 400mg dosage.   Ok to take tylenol 2-325 or 2-500mg tabs prior to vaccine.    Take claritin (a non-sedating anti-histamine) 10 mg daily for 2 days and 2 over the counter 200mg  Ibuprofen pills every 4-6 hours with food while awake for the next 2-3 days .  Take them with food so they don't irritate your stomach.  You can do the above regimen for 2-3 days after your first,  second, or third  shots to decrease the possibility of achiness, fever, chills after your COVID-19 novel coronavirus or any current vaccines.       Preventive Health Recommendations  Female Ages 50 - 64    Yearly exam: See your health care provider every year in order to  Review health changes.   Discuss preventive care.    Review your medicines if your doctor has prescribed any.    Get a Pap test every three years (unless you have an abnormal result and your provider advises testing more often).  If you get Pap tests with HPV test, you only need to test every 5 years, unless you have an abnormal result.   You do not need a Pap test if your uterus was removed (hysterectomy) and you have not had cancer.  You should be tested each year for STDs (sexually transmitted diseases) if you're at risk.   Have a mammogram every 1 to 2 years.  Have a colonoscopy at age 50, or have a yearly FIT test (stool test). These exams screen for colon  "cancer.    Have a cholesterol test every 5 years, or more often if advised.  Have a diabetes test (fasting glucose) every three years. If you are at risk for diabetes, you should have this test more often.   If you are at risk for osteoporosis (brittle bone disease), think about having a bone density scan (DEXA).    Shots: Get a flu shot each year. Get a tetanus shot every 10 years.    Nutrition:   Eat at least 5 servings of fruits and vegetables each day.  Eat whole-grain bread, whole-wheat pasta and brown rice instead of white grains and rice.  Get adequate Calcium and Vitamin D.     Lifestyle  Exercise at least 150 minutes a week (30 minutes a day, 5 days a week). This will help you control your weight and prevent disease.  Limit alcohol to one drink per day.  No smoking.   Wear sunscreen to prevent skin cancer.   See your dentist every six months for an exam and cleaning.  See your eye doctor every 1 to 2 years.    Thank you so much or choosing [facility]  for your Health Care. It was a pleasure seeing you at your visit today! Please contact us with any questions or concerns you may have.                   Patti uPrcell MD                              To reach your [facility] care team after hours call:   [phone] press #2 \"to speak with your care team\".  This will get you to our clinic instead of routing to central [facility]  scheduling.     PLEASE NOTE OUR HOURS HAVE CHANGED secondary to COVID-19 coronavirus pandemic, as we are trying to minimize patient exposure to the virus,  which is now widespread in the [facility].  These hours may change with very little notice.  We apologize for any inconvenience.       Our current clinic hours are:          Monday- Thursday   7:00am - 6:00pm  in person.      Friday  7:00am- 5:00pm                       Saturday and Sunday : Closed to in person and virtual visits        We have telephone and virtual " visit times available between    7:00am - 6pm on Monday-Friday as well.                                                Phone:  [phone]      Our pharmacy hours: Monday through Friday 8:00am to 5:00pm                        Saturday - 9:00 am to 12 noon       Sunday : Closed.              Phone:  [phone]              ###  Please note: at this time we are not accepting any walk-in visits. ###      There is also information available at our web site:  [email or web address]    If your provider ordered any lab tests and you do not receive the results within 10 business days, please call the clinic.    If you need a medication refill please contact your pharmacy.  Please allow 3 business days for your refill to be completed.    Our clinic offers telephone visits and e visits.  Please ask one of your team members to explain more.      Use [employer] (secure email communication and access to your chart) to send your primary care provider a message or make an appointment. Ask someone on your Team how to sign up for [employer].

## 2023-02-16 NOTE — PROGRESS NOTES
SUBJECTIVE:   CC: Evita is an 52 year old who presents for preventive health visit and the following other medical problems:      1. Encounter for routine adult health examination with abnormal findings    2. Recurrent postcoital urinary tract infection    3. Essential hypertension with goal blood pressure less than 140/90    4. Visit for screening mammogram    5. Symptomatic menopausal or female climacteric states    6. CARDIOVASCULAR SCREENING; LDL GOAL LESS THAN 130      Sees Dr. Sujey Hennessy - [facility] ob/[facility]yn  - for her routine ob/gyn exams yearly.       Patient has been advised of split billing requirements and indicates understanding: Yes  Healthy Habits:     Getting at least 3 servings of Calcium per day:  Yes    Bi-annual eye exam:  Yes    Dental care twice a year:  Yes    Sleep apnea or symptoms of sleep apnea:  None    Diet:  Regular (no restrictions)    Frequency of exercise:  4-5 days/week    Duration of exercise:  30-45 minutes    Taking medications regularly:  Yes    Medication side effects:  None    PHQ-2 Total Score: 0    Additional concerns today:  No      Hypertension Follow-up:       Do you check your blood pressure regularly outside of the clinic? Yes     Are you following a low salt diet? No    Are your blood pressures ever more than 140 on the top number (systolic) OR more   than 90 on the bottom number (diastolic), for example 140/90? No      Today's PHQ-2 Score:   PHQ-2 ( 1999 [employer]) 2/15/2023   Q1: Little interest or pleasure in doing things 0   Q2: Feeling down, depressed or hopeless 0   PHQ-2 Score 0   PHQ-2 Total Score (12-17 Years)- Positive if 3 or more points; Administer PHQ-A if positive -   Q1: Little interest or pleasure in doing things Not at all   Q2: Feeling down, depressed or hopeless Not at all   PHQ-2 Score 0       Have you ever done Advance Care Planning? (For example, a Health Directive, POLST, or a discussion with a medical provider or your loved ones about  your wishes): No, advance care planning information given to patient to review.  Patient declined advance care planning discussion at this time.    Social History     Tobacco Use     Smoking status: Never     Smokeless tobacco: Never   Substance Use Topics     Alcohol use: Yes     Alcohol/week: 0.0 standard drinks     Comment: 0-1 wine cooler a week     If you drink alcohol do you typically have >3 drinks per day or >7 drinks per week? No    Alcohol Use 2023   Prescreen: >3 drinks/day or >7 drinks/week? -   Prescreen: >3 drinks/day or >7 drinks/week? No       Reviewed orders with patient.  Reviewed health maintenance and updated orders accordingly - Yes  Lab work is in process  Labs reviewed in EPIC  BP Readings from Last 3 Encounters:   23 120/70   22 112/68   22 120/86    Wt Readings from Last 3 Encounters:   23 63.5 kg (140 lb 1.6 oz)   22 61.7 kg (136 lb)   22 61.8 kg (136 lb 3.2 oz)                  Patient Active Problem List   Diagnosis     Family history of ischemic heart disease - father's side of family - with normal lipids     Family history of ovarian cancer - following with Dr. Hennessy now - [facility] ob[facility]  - ? BSO in late       CARDIOVASCULAR SCREENING; LDL GOAL LESS THAN 160     Essential hypertension with goal blood pressure less than 140/90     Chest pain, unspecified     Irritable bowel syndrome with diarrhea     Anisometropia     Vitamin D deficiency     Menopause     Past Surgical History:   Procedure Laterality Date     ABDOMINOPLASTY N/A 2019    Procedure: MINI ABDOMINOPLASTY;  Surgeon: Carolina Kitchen MD;  Location: [facility];  Service: Plastics      SECTION       COLONOSCOPY      Dr. Castro [facility]     COLONOSCOPY N/A 10/12/2018    Procedure: COLONOSCOPY;  COLONOSCOPY [fv]  ;  Surgeon: Alexander Castro MD;  Location:  GI     ESOPHAGOSCOPY, GASTROSCOPY, DUODENOSCOPY (EGD), COMBINED N/A 2019    Procedure:  ESOPHAGOGASTRODUODENOSCOPY, WITH BIOPSY;  Surgeon: Alexander Castro MD;  Location:  GI     mini tummy tuck  2019     ZZC  DELIVERY ONLY  94,96,99,02    4 C/S       Social History     Tobacco Use     Smoking status: Never     Smokeless tobacco: Never   Substance Use Topics     Alcohol use: Yes     Alcohol/week: 0.0 standard drinks     Comment: 0-1 wine cooler a week     Family History   Problem Relation Age of Onset     Hypertension Mother      Osteoporosis Mother         Osteopenia     Bladder Cancer Mother      Gallbladder Disease Mother         Gallbladder removed- with some dysplasia noted     C.A.D. Father         MI at age 59 and stented -      Hypertension Father      Gastrointestinal Disease Father         gallstones and gallstone pancreatitis at age 61     Lipids Father         low HDL     Genitourinary Problems Father         uric acid kidney stone     Dementia Father         possible early dementia     C.A.D. Paternal Grandfather         bypass in 70's     Depression Paternal Grandfather         in old age     Respiratory Paternal Grandfather         sleep apnea     Hypertension Maternal Grandfather      Alzheimer Disease Maternal Grandfather         in late 70's     Gastrointestinal Disease Paternal Grandmother          of gallstone pancreatitis at age 50     Cancer Maternal Grandmother          of ovarian cancer at age 50     Coronary Artery Disease Early Onset Other         father's side of family - late 50's with normal LDLs and BP's      Aneurysm Paternal Half-Sister      Colon Cancer Maternal Uncle         3 maternal great-uncles with colon cancer      Aneurysm Paternal Uncle          Current Outpatient Medications   Medication Sig Dispense Refill     candesartan (ATACAND) 8 MG tablet TAKE ONE TABLET BY MOUTH DAILY 90 tablet 1     Cholecalciferol (VITAMIN D) 2000 UNITS CAPS Take by mouth daily        Coenzyme Q10 (COQ10) 50 MG CAPS Take 70 mg by mouth daily       GLUCOSAMINE  500 MG OR CAPS 1 tab BID  0     MULTI-VITAMIN OR TABS 1 TABLET DAILY 30 0     OMEGA-3 CAPS   OR 2 caps Twice daily,  0     PROBIOTICA OR CHEW take one tablet daily  0     progesterone (PROMETRIUM) 100 MG capsule 2 tablets daily on days 1-14 of cycle. 84 capsule 3     sulfamethoxazole-trimethoprim (BACTRIM DS) 800-160 MG tablet TAKE ONE TABLET BY MOUTH AFTER INTERCOURSE AS NEEDED 30 tablet 1     estradiol (CLIMARA) 0.05 MG/24HR weekly patch Place 1 patch onto the skin once a week 12 patch 3     Allergies   Allergen Reactions     No Known Drug Allergies      Recent Labs   Lab Test 06/20/22  0817 12/16/20  0906 10/31/19  1440 04/25/19  1231 03/01/18  1447   LDL 93 119*  --   --  99   HDL 62 63  --   --  48*   TRIG 86 77  --   --  100   ALT 18 31 25   < >  --    CR 0.85 0.85 0.93   < > 0.92   GFRESTIMATED 81 79 71   < > 64   GFRESTBLACK  --  >90 82   < > 78   POTASSIUM 3.7 4.0 3.6   < > 3.5   TSH 2.47 1.89  --    < > 1.33    < > = values in this interval not displayed.        Breast Cancer Screening:    FHS-7:   Breast CA Risk Assessment (FHS-7) 7/20/2021 2/8/2022 7/21/2022 2/15/2023   Did any of your first-degree relatives have breast or ovarian cancer? No No No No   Did any of your relatives have bilateral breast cancer? No No No No   Did any man in your family have breast cancer? No No No No   Did any woman in your family have breast and ovarian cancer? No No No No   Did any woman in your family have breast cancer before age 50 y? No No No No   Do you have 2 or more relatives with breast and/or ovarian cancer? No No No No   Do you have 2 or more relatives with breast and/or bowel cancer? No No No No       Mammogram Screening: Recommended annual mammography  Pertinent mammograms are reviewed under the imaging tab.    History of abnormal Pap smear: NO - age 30- 65 PAP every 5 years recommended  PAP / HPV Latest Ref Rng & Units 1/11/2019 1/6/2017 9/20/2013   PAP (Historical) - NIL NIL NIL   HPV16 NEG:Negative Negative  Negative -   HPV18 NEG:Negative Negative Negative -   HRHPV NEG:Negative Negative Negative -     Reviewed and updated as needed this visit by clinical staff   Tobacco  Allergies  Meds              Reviewed and updated as needed this visit by Provider                 Past Medical History:   Diagnosis Date     Calculus of kidney 1996    Calcium stone - etiology after w/u was excess calcium intake     Fam hx-cardiovas dis NEC     father with MI at age 58 yo, s/p PTCA     Family history of ovarian cancer - following with Dr. Stuart - ? BSO in late        Fertility testing     used lifetime total of 11 months of Clomid and 2 months of gonadotropins     Hypertension      Volvulus (H) child    Resolved without surgery - hospitalized for 1 day      Past Surgical History:   Procedure Laterality Date     ABDOMINOPLASTY N/A 2019    Procedure: MINI ABDOMINOPLASTY;  Surgeon: Carolina Kitchen MD;  Location: [facility];  Service: Plastics      SECTION       COLONOSCOPY      Dr. Castro [facility]     COLONOSCOPY N/A 10/12/2018    Procedure: COLONOSCOPY;  COLONOSCOPY [fv]  ;  Surgeon: Alexander Castro MD;  Location: [facility]     ESOPHAGOSCOPY, GASTROSCOPY, DUODENOSCOPY (EGD), COMBINED N/A 2019    Procedure: ESOPHAGOGASTRODUODENOSCOPY, WITH BIOPSY;  Surgeon: Alexander Castro MD;  Location: [facility]     mini tummy ck  2019     Z  DELIVERY ONLY  94,96,99,02    4 C/S     OB History    Para Term  AB Living   6 4 4 0 2 4   SAB IAB Ectopic Multiple Live Births   2 0 0 0 4      # Outcome Date GA Lbr Berry/2nd Weight Sex Delivery Anes PTL Lv   6 Term 02 37w0d 02:00 4.082 kg (9 lb) F CS SPINAL  ELSIE      Birth Comments: elective repeat C/S after SROM and spontaneous labor      Name: Demi      Apgar1: 8  Apgar5: 9   5 SAB 01 6w0d    SAB   DEC      Birth Comments: NO D&C   4 Term 99 39w0d  3.685 kg (8 lb 2 oz) M CS EPIDURAL  ELSIE      Birth  "Comments: elective repeat C/S      Name: Srinivasa      Apgar1: 9  Apgar5: 10   3 Term 96 38w0d 36:00 4.139 kg (9 lb 2 oz) M CS EPIDURAL  ELSIE      Birth Comments: failed  attempt      Name: Allan      Apgar1: 9  Apgar5: 10   2 Term 94 39w0d  3.827 kg (8 lb 7 oz) F CS EPIDURAL  ELSIE      Birth Comments: elective C/S for breech      Name: Sujey      Apgar1: 8  Apgar5: 9   1 SAB 93 14w0d    SAB   DEC      Birth Comments: NO D&C       Review of Systems   Constitutional: Negative for chills and fever.   HENT: Negative for congestion, ear pain, hearing loss and sore throat.    Eyes: Negative for pain and visual disturbance.   Respiratory: Negative for cough and shortness of breath.    Cardiovascular: Negative for chest pain, palpitations and peripheral edema.   Gastrointestinal: Negative for abdominal pain, constipation, diarrhea, heartburn, hematochezia and nausea.   Breasts:  Negative for tenderness, breast mass and discharge.   Genitourinary: Negative for dysuria, frequency, genital sores, hematuria, pelvic pain, urgency, vaginal bleeding and vaginal discharge.   Musculoskeletal: Negative for arthralgias, joint swelling and myalgias.   Skin: Negative for rash.   Neurological: Negative for dizziness, weakness, headaches and paresthesias.   Psychiatric/Behavioral: Negative for mood changes. The patient is not nervous/anxious.         OBJECTIVE:   /70   Pulse 90   Temp 98  F (36.7  C) (Tympanic)   Resp 16   Ht 1.632 m (5' 4.25\")   Wt 63.5 kg (140 lb 1.6 oz)   LMP 2020   SpO2 100%   BMI 23.86 kg/m    Physical Exam:   GENERAL APPEARANCE: healthy, alert and no distress  EYES: Eyes grossly normal to inspection, PERRL and conjunctivae and sclerae normal  HENT: ear canals and TM's normal, nose and mouth without ulcers or lesions, oropharynx clear and oral mucous membranes moist  NECK: no adenopathy, no asymmetry, masses, or scars and thyroid normal to palpation  RESP: lungs clear to " auscultation - no rales, rhonchi or wheezes  CV: regular rate and rhythm, normal S1 S2, no S3 or S4, no murmur, click or rub, no peripheral edema and peripheral pulses strong  BREAST: Breasts are symmetric.  No dominant, discrete, fixed  or suspicious masses are noted.  Mild symmetric fibrocystic densities are noted in both upper outer quadrants. No skin or nipple changes or axillary nodes. Self exam is taught and encouraged. Mammogram - ordered .   ABDOMEN: soft, nontender, no hepatosplenomegaly, no masses and bowel sounds normal  MS: no musculoskeletal defects are noted and gait is age appropriate without ataxia  SKIN: no suspicious lesions or rashes  NEURO: Normal strength and tone, sensory exam grossly normal, mentation intact and speech normal  PSYCH: mentation appears normal and affect normal/bright    Pt will have her annual exam with Dr. Sujey Hennessy ob/gyn for her  pelvic exam.     Diagnostic Test Results:  Labs reviewed in Epic    ASSESSMENT/PLAN:       ICD-10-CM    1. Encounter for routine adult health examination with abnormal findings  Z00.01       2. Recurrent postcoital urinary tract infection  N39.0 sulfamethoxazole-trimethoprim (BACTRIM DS) 800-160 MG tablet      3. Essential hypertension with goal blood pressure less than 140/90- well controlled today , but  needs labs and med refills   I10 candesartan (ATACAND) 8 MG tablet      4. Visit for screening mammogram  Z12.31 MA Screening Bilateral w/ Mina      5. Symptomatic menopausal or female climacteric states - needs estradiol refilled ; had prometrium refilled by Dr. Hennessy in 12/2022   N95.1 estradiol (ESTRACE) 0.5 MG tablet     DX Hip/Pelvis/Spine      6. CARDIOVASCULAR SCREENING; LDL GOAL LESS THAN 130  Z13.6           Patient has been advised of split billing requirements and indicates understanding: Yes      COUNSELING:  Reviewed preventive health counseling, as reflected in patient instructions    Return in about 1 year (around 2/16/2024) for  Physical/Preventative Visit, w/ Dr. BERNSTEIN for 40 minute appointment.     She reports that she has never smoked. She has never used smokeless tobacco.            Patti Purcell MD  [facility] [place]

## 2023-02-21 RX ORDER — DOXYCYCLINE HYCLATE 100 MG
TABLET ORAL
Qty: 112 TABLET | Refills: 0 | OUTPATIENT
Start: 2023-02-21

## 2023-02-21 NOTE — TELEPHONE ENCOUNTER
Attempt # 1    Called # [phone]     Left a non detailed VM to call back at [phone] and ask for any available Triage Nurse. Is patient requesting this medication? Last prescribed in 2018.     Kelsi Hinkle RN  [facility]

## 2023-02-21 NOTE — TELEPHONE ENCOUNTER
Patient returned call. She was on this but is now done. Will refuse refill.    Kelsi Hinkle RN [facility]

## 2023-02-27 PROBLEM — N39.0 RECURRENT POSTCOITAL URINARY TRACT INFECTION: Status: ACTIVE | Noted: 2023-02-27

## 2023-03-30 ENCOUNTER — OFFICE VISIT (OUTPATIENT)
Dept: OPTOMETRY | Facility: CLINIC | Age: 53
End: 2023-03-30
Payer: COMMERCIAL

## 2023-03-30 DIAGNOSIS — H52.203 HYPEROPIA OF BOTH EYES WITH ASTIGMATISM: Primary | ICD-10-CM

## 2023-03-30 DIAGNOSIS — H52.31 ANISOMETROPIA: ICD-10-CM

## 2023-03-30 DIAGNOSIS — H52.03 HYPEROPIA OF BOTH EYES WITH ASTIGMATISM: Primary | ICD-10-CM

## 2023-03-30 DIAGNOSIS — H52.4 PRESBYOPIA: ICD-10-CM

## 2023-03-30 PROCEDURE — 92014 COMPRE OPH EXAM EST PT 1/>: CPT | Performed by: OPTOMETRIST

## 2023-03-30 PROCEDURE — 92015 DETERMINE REFRACTIVE STATE: CPT | Performed by: OPTOMETRIST

## 2023-03-30 ASSESSMENT — REFRACTION_WEARINGRX
OD_CYLINDER: SPHERE
OS_CYLINDER: +0.50
OS_SPHERE: +0.75
OD_SPHERE: +0.50
OS_ADD: +2.00
OS_AXIS: 090
OD_ADD: +2.00

## 2023-03-30 ASSESSMENT — REFRACTION_MANIFEST
OD_CYLINDER: +0.75
METHOD_AUTOREFRACTION: 1
OD_AXIS: 101
OS_CYLINDER: +0.25
OD_CYLINDER: +1.75
OD_SPHERE: +3.25
OS_SPHERE: +1.00
OS_AXIS: 058
OD_AXIS: 102
OS_AXIS: 088
OD_SPHERE: +5.25
OS_SPHERE: +1.50
OS_CYLINDER: +0.50

## 2023-03-30 ASSESSMENT — EXTERNAL EXAM - LEFT EYE: OS_EXAM: NORMAL

## 2023-03-30 ASSESSMENT — CONF VISUAL FIELD
OS_INFERIOR_NASAL_RESTRICTION: 0
OS_NORMAL: 1
OD_NORMAL: 1
OD_INFERIOR_TEMPORAL_RESTRICTION: 0
OS_SUPERIOR_NASAL_RESTRICTION: 0
OD_INFERIOR_NASAL_RESTRICTION: 0
OD_SUPERIOR_TEMPORAL_RESTRICTION: 0
OD_SUPERIOR_NASAL_RESTRICTION: 0
OS_INFERIOR_TEMPORAL_RESTRICTION: 0
METHOD: COUNTING FINGERS
OS_SUPERIOR_TEMPORAL_RESTRICTION: 0

## 2023-03-30 ASSESSMENT — REFRACTION
OD_AXIS: 102
OD_SPHERE: +5.25
OD_CYLINDER: +0.75
OS_SPHERE: +1.50
OS_AXIS: 088
OS_CYLINDER: +0.50

## 2023-03-30 ASSESSMENT — EXTERNAL EXAM - RIGHT EYE: OD_EXAM: NORMAL

## 2023-03-30 ASSESSMENT — VISUAL ACUITY
OD_CC: 20/30
METHOD: SNELLEN - LINEAR
OS_CC: 20/30+3
OS_CC: 20/20
CORRECTION_TYPE: GLASSES, CONTACTS
OD_CC: 20/20

## 2023-03-30 ASSESSMENT — REFRACTION_CURRENTRX
OD_BASECURVE: 8.6
OD_SPHERE: +6.00
OD_CYLINDER: -0.75
OD_DIAMETER: 14.3
OD_BRAND: MY DAY TORIC
OD_AXIS: 010

## 2023-03-30 ASSESSMENT — CUP TO DISC RATIO
OS_RATIO: 0.5
OD_RATIO: 0.45

## 2023-03-30 ASSESSMENT — TONOMETRY
IOP_METHOD: APPLANATION
OS_IOP_MMHG: 17
OD_IOP_MMHG: 17

## 2023-03-30 NOTE — LETTER
3/30/2023         RE: Evita Carson  [address]  [place] [address]        Dear Colleague,    Thank you for referring your patient, Evita Carson, to the [facility]. Please see a copy of my visit note below.    Chief Complaint   Patient presents with     Annual Eye Exam      Having lasik on left eye w/ Dr Piper at [facility]  Wants to go with readers only could not tolerate contact lens in L eye and would like to see for scuba diving   Loves vision with progressive addition lens and contacts but dislikes wearing the glasses for everything and would like to decrease dependence on them    Last Eye Exam: 01/2022    Dilated Previously: Yes, side effects of dilation explained today    What are you currently using to see?  glasses and contacts - PROGRESSIVE ADDITION LENS  Also wears contact on right eye - does not want a contact lens eval today  Contact lens prescription through 1/24     Distance Vision Acuity: Satisfied with vision    Near Vision Acuity: Satisfied with vision while reading and using computer with glasses and contacts     Eye Comfort: good has never needed artificial tears    Do you use eye drops? : No      Lyn Lr - Optometric Assistant           Medical, surgical and family histories reviewed and updated 3/30/2023.       OBJECTIVE: See Ophthalmology exam    ASSESSMENT:    ICD-10-CM    1. Hyperopia of both eyes with astigmatism  H52.03     H52.203       2. Anisometropia  H52.31       3. Presbyopia  H52.4         L eye dominant , no amblyopia R    PLAN:   She will let me know if exam should be faxed to [facility]  Contact lens prescription good for one year, will wear in R eye   With readers     Malorie Hinkle OD       Again, thank you for allowing me to participate in the care of your patient.        Sincerely,        Malorie Hinkle, OD

## 2023-03-30 NOTE — PROGRESS NOTES
Chief Complaint   Patient presents with     Annual Eye Exam      Having lasik on left eye w/ Dr Piper at [facility]  Wants to go with readers only could not tolerate contact lens in L eye and would like to see for scuba diving   Loves vision with progressive addition lens and contacts but dislikes wearing the glasses for everything and would like to decrease dependence on them    Last Eye Exam: 01/2022    Dilated Previously: Yes, side effects of dilation explained today    What are you currently using to see?  glasses and contacts - PROGRESSIVE ADDITION LENS  Also wears contact on right eye - does not want a contact lens eval today  Contact lens prescription through 1/24     Distance Vision Acuity: Satisfied with vision    Near Vision Acuity: Satisfied with vision while reading and using computer with glasses and contacts     Eye Comfort: good has never needed artificial tears    Do you use eye drops? : No      Lyn Lr - Optometric Assistant           Medical, surgical and family histories reviewed and updated 3/30/2023.       OBJECTIVE: See Ophthalmology exam    ASSESSMENT:    ICD-10-CM    1. Hyperopia of both eyes with astigmatism  H52.03     H52.203       2. Anisometropia  H52.31       3. Presbyopia  H52.4         L eye dominant , no amblyopia R    PLAN:   She will let me know if exam should be faxed to [facility]  Contact lens prescription good for one year, will wear in R eye   With readers     Malorie Hinkle OD

## 2023-04-27 DIAGNOSIS — N39.0 RECURRENT POSTCOITAL URINARY TRACT INFECTION: ICD-10-CM

## 2023-05-01 RX ORDER — SULFAMETHOXAZOLE/TRIMETHOPRIM 800-160 MG
TABLET ORAL
Qty: 30 TABLET | Refills: 1 | Status: SHIPPED | OUTPATIENT
Start: 2023-05-01 | End: 2023-11-07

## 2023-05-30 ENCOUNTER — MYC MEDICAL ADVICE (OUTPATIENT)
Dept: FAMILY MEDICINE | Facility: CLINIC | Age: 53
End: 2023-05-30
Payer: COMMERCIAL

## 2023-05-30 DIAGNOSIS — I10 ESSENTIAL HYPERTENSION WITH GOAL BLOOD PRESSURE LESS THAN 140/90: ICD-10-CM

## 2023-05-30 DIAGNOSIS — Z13.6 CARDIOVASCULAR SCREENING; LDL GOAL LESS THAN 130: Primary | ICD-10-CM

## 2023-06-01 NOTE — TELEPHONE ENCOUNTER
Please review [employer] lab order requests     LOV 2/16/23- physical     Last yearly labs were done 6/20/22    Gail ROACH RN   [facility] Triage

## 2023-07-25 ENCOUNTER — HOSPITAL ENCOUNTER (OUTPATIENT)
Dept: MAMMOGRAPHY | Facility: CLINIC | Age: 53
Discharge: HOME | End: 2023-07-25
Attending: FAMILY MEDICINE | Admitting: FAMILY MEDICINE
Payer: COMMERCIAL

## 2023-07-25 DIAGNOSIS — Z12.31 VISIT FOR SCREENING MAMMOGRAM: ICD-10-CM

## 2023-07-25 PROCEDURE — 77067 SCR MAMMO BI INCL CAD: CPT

## 2023-10-02 DIAGNOSIS — I10 ESSENTIAL HYPERTENSION WITH GOAL BLOOD PRESSURE LESS THAN 140/90: ICD-10-CM

## 2023-10-03 RX ORDER — CANDESARTAN 8 MG/1
8 TABLET ORAL DAILY
Qty: 90 TABLET | Refills: 1 | Status: SHIPPED | OUTPATIENT
Start: 2023-10-03 | End: 2024-02-22

## 2023-11-06 DIAGNOSIS — Z78.0 MENOPAUSE: ICD-10-CM

## 2023-11-06 NOTE — TELEPHONE ENCOUNTER
Patient has upcoming appointment with Dr. Hennessy 11/7.  Will wait on refill until appointment.    Monique SHELBY RN

## 2023-11-07 ENCOUNTER — OFFICE VISIT (OUTPATIENT)
Dept: OBGYN | Facility: CLINIC | Age: 53
End: 2023-11-07
Payer: COMMERCIAL

## 2023-11-07 VITALS — WEIGHT: 135 LBS | BODY MASS INDEX: 22.99 KG/M2 | SYSTOLIC BLOOD PRESSURE: 108 MMHG | DIASTOLIC BLOOD PRESSURE: 78 MMHG

## 2023-11-07 DIAGNOSIS — Z78.0 MENOPAUSE: ICD-10-CM

## 2023-11-07 DIAGNOSIS — Z12.4 PAP SMEAR FOR CERVICAL CANCER SCREENING: Primary | ICD-10-CM

## 2023-11-07 PROCEDURE — 87624 HPV HI-RISK TYP POOLED RSLT: CPT | Performed by: OBSTETRICS & GYNECOLOGY

## 2023-11-07 PROCEDURE — 99213 OFFICE O/P EST LOW 20 MIN: CPT | Performed by: OBSTETRICS & GYNECOLOGY

## 2023-11-07 PROCEDURE — G0145 SCR C/V CYTO,THINLAYER,RESCR: HCPCS | Performed by: OBSTETRICS & GYNECOLOGY

## 2023-11-07 RX ORDER — PROGESTERONE 100 MG/1
CAPSULE ORAL
Qty: 84 CAPSULE | Refills: 3 | OUTPATIENT
Start: 2023-11-07

## 2023-11-07 RX ORDER — ESTRADIOL 0.5 MG/1
0.5 TABLET ORAL DAILY
Qty: 90 TABLET | Refills: 3 | Status: SHIPPED | OUTPATIENT
Start: 2023-11-07

## 2023-11-07 RX ORDER — PROGESTERONE 100 MG/1
CAPSULE ORAL
Qty: 84 CAPSULE | Refills: 3 | Status: SHIPPED | OUTPATIENT
Start: 2023-11-07

## 2023-11-07 NOTE — NURSING NOTE
"Chief Complaint   Patient presents with    Gyn Exam     Pap due, refill progesterone and estradiol       Initial /78   Wt 61.2 kg (135 lb)   LMP 2020   BMI 22.99 kg/m   Estimated body mass index is 22.99 kg/m  as calculated from the following:    Height as of 23: 1.632 m (5' 4.25\").    Weight as of this encounter: 61.2 kg (135 lb).  BP completed using cuff size: regular    Questioned patient about current smoking habits.  Pt. has never smoked.          The following HM Due: pap smear             "

## 2023-11-08 NOTE — PROGRESS NOTES
SUBJECTIVE:                                                     Evita Carson is a 53 year old female  who presents today for follow up of menopause symptoms and medication management. Is also due for Pap today.    Doing well with cyclic progesterone--no breakthrough bleeding on this.  Has not had any withdrawal bleeding in months.  Discussed could continue with cyclic therapy or could switch again to continuous.  Occasionally has a little dryness/irritation/itching of the vulvar tissues, but no pain with intercourse and nothing persistent or often.      Problem list and histories reviewed & adjusted, as indicated.  Additional history: as documented.    Patient Active Problem List   Diagnosis    Family history of ischemic heart disease - father's side of family - with normal lipids    Family history of ovarian cancer - following with Dr. Hennessy now - [facility] ob/gyn  - ? BSO in late      CARDIOVASCULAR SCREENING; LDL GOAL LESS THAN 130    Essential hypertension with goal blood pressure less than 140/90    Chest pain, unspecified    Irritable bowel syndrome with diarrhea    Anisometropia    Vitamin D deficiency    Menopause    Symptomatic menopausal or female climacteric states    Recurrent postcoital urinary tract infection     Past Surgical History:   Procedure Laterality Date    ABDOMINOPLASTY N/A 2019    Procedure: MINI ABDOMINOPLASTY;  Surgeon: Carolina Kitchen MD;  Location: [facility];  Service: Plastics     SECTION      COLONOSCOPY      Dr. Castro [facility]    COLONOSCOPY N/A 10/12/2018    Procedure: COLONOSCOPY;  COLONOSCOPY [fv]  ;  Surgeon: Alexander Castro MD;  Location: [facility]    ESOPHAGOSCOPY, GASTROSCOPY, DUODENOSCOPY (EGD), COMBINED N/A 2019    Procedure: ESOPHAGOGASTRODUODENOSCOPY, WITH BIOPSY;  Surgeon: Alexander Castro MD;  Location: [facility]    mini tummy [facility]  2019    [facility]  DELIVERY ONLY  94,96,99,02    4 C/S      Social History     Tobacco Use     Smoking status: Never    Smokeless tobacco: Never   Substance Use Topics    Alcohol use: Yes     Alcohol/week: 0.0 standard drinks of alcohol     Comment: 0-1 wine cooler a week      Problem (# of Occurrences) Relation (Name,Age of Onset)    Dementia (1) Father (Pelon): possible early dementia    Alzheimer Disease (1) Maternal Grandfather (Artur): in late 70's    Cancer (1) Maternal Grandmother:  of ovarian cancer at age 50    Depression (1) Paternal Grandfather (Benjamin Pinedo): in old age    Gastrointestinal Disease (2) Father (Pelon): gallstones and gallstone pancreatitis at age 61, Paternal Grandmother:  of gallstone pancreatitis at age 50    Genitourinary Problems (1) Father (Pelon): uric acid kidney stone    Hypertension (3) Mother (Nola), Father (Pelon), Maternal Grandfather (Artur)    Lipids (1) Father (Pelon): low HDL    Osteoporosis (1) Mother (Nola): Osteopenia    Respiratory (1) Paternal Grandfather (Benjamin Pinedo): sleep apnea    C.A.D. (2) Father (Pelon): MI at age 59 and stented - , Paternal Grandfather (Benjamin Pinedo): bypass in 70's    Aneurysm (2) Paternal Half-Sister (uncle), Paternal Uncle    Gallbladder Disease (1) Mother (Nola): Gallbladder removed- with some dysplasia noted    Colon Cancer (1) Maternal Uncle: 3 maternal great-uncles with colon cancer     Bladder Cancer (1) Mother (Nola)    Coronary Artery Disease Early Onset (1) Other: father's side of family - late 50's with normal LDLs and BP's               candesartan (ATACAND) 8 MG tablet, TAKE 1 TABLET (8 MG) BY MOUTH DAILY  Cholecalciferol (VITAMIN D) 2000 UNITS CAPS, Take by mouth daily   Coenzyme Q10 (COQ10) 50 MG CAPS, Take 70 mg by mouth daily  GLUCOSAMINE 500 MG OR CAPS, 1 tab BID  MULTI-VITAMIN OR TABS, 1 TABLET DAILY  OMEGA-3 CAPS   OR, 2 caps Twice daily,  PROBIOTICA OR CHEW, take one tablet daily  sulfamethoxazole-trimethoprim (BACTRIM DS) 800-160 MG tablet, TAKE ONE TABLET BY MOUTH AFTER INTERCOURSE AS NEEDED    No  current facility-administered medications on file prior to visit.    Allergies   Allergen Reactions    No Known Drug Allergy        ROS:  Negative other than as noted in HPI.    OBJECTIVE:     Exam:  Constitutional:  Appearance: Well nourished, well developed alert, in no acute distress  Neurologic/Psychiatric:  Mental Status:  Oriented X3   Pelvic Exam:  External Genitalia:     Normal appearance for age, no discharge present, no tenderness present, no inflammatory lesions present, color normal  Vagina:     Normal vaginal vault without central or paravaginal defects, no discharge present, no inflammatory lesions present, no masses present  Bladder:     Nontender to palpation  Urethra:   Urethral Body:  Urethra palpation normal, urethra structural support normal   Urethral Meatus:  No erythema or lesions present  Cervix:     Appearance healthy, no lesions present, nontender to palpation, no bleeding present  Uterus:     Uterus: firm, normal sized and nontender, anteverted in position.   Adnexa:     No adnexal tenderness present, no adnexal masses present  Perineum:     Perineum within normal limits, no evidence of trauma, no rashes or skin lesions present  Anus:     Anus within normal limits, no hemorrhoids present  Inguinal Lymph Nodes:     No lymphadenopathy present  Pubic Hair:     Normal pubic hair distribution for age  Genitalia and Groin:     No rashes present, no lesions present, no areas of discoloration, no masses present      In-Clinic Test Results:  No results found for this or any previous visit (from the past 24 hour(s)).    ASSESSMENT/PLAN:                                                        ICD-10-CM    1. Pap smear for cervical cancer screening  Z12.4 Pap screen with HPV - recommended age 30 - 65 years      2. Menopause  Z78.0 progesterone (PROMETRIUM) 100 MG capsule          Meds renewed for one year.  Follow up in 1 year or as needed.  Pap with HPV today, repeat in 5 years if normal.  Discussed  vaginal estradiol briefly, can send prescription if her irritation/itch worsens or becomes more persistent.    Sujey Hennessy MD  [facility]

## 2023-11-10 LAB
BKR LAB AP GYN ADEQUACY: NORMAL
BKR LAB AP GYN INTERPRETATION: NORMAL
BKR LAB AP HPV REFLEX: NORMAL
BKR LAB AP PREVIOUS ABNORMAL: NORMAL
PATH REPORT.COMMENTS IMP SPEC: NORMAL
PATH REPORT.COMMENTS IMP SPEC: NORMAL
PATH REPORT.RELEVANT HX SPEC: NORMAL

## 2023-11-13 LAB
HUMAN PAPILLOMA VIRUS 16 DNA: NEGATIVE
HUMAN PAPILLOMA VIRUS 18 DNA: NEGATIVE
HUMAN PAPILLOMA VIRUS FINAL DIAGNOSIS: NORMAL
HUMAN PAPILLOMA VIRUS OTHER HR: NEGATIVE

## 2024-01-17 ENCOUNTER — PATIENT OUTREACH (OUTPATIENT)
Dept: CARE COORDINATION | Facility: CLINIC | Age: 54
End: 2024-01-17
Payer: COMMERCIAL

## 2024-01-31 ENCOUNTER — PATIENT OUTREACH (OUTPATIENT)
Dept: CARE COORDINATION | Facility: CLINIC | Age: 54
End: 2024-01-31
Payer: COMMERCIAL

## 2024-02-05 ENCOUNTER — LAB (OUTPATIENT)
Dept: LAB | Facility: CLINIC | Age: 54
End: 2024-02-05
Payer: COMMERCIAL

## 2024-02-05 DIAGNOSIS — I10 ESSENTIAL HYPERTENSION WITH GOAL BLOOD PRESSURE LESS THAN 140/90: ICD-10-CM

## 2024-02-05 DIAGNOSIS — Z13.6 CARDIOVASCULAR SCREENING; LDL GOAL LESS THAN 130: ICD-10-CM

## 2024-02-05 LAB
ALBUMIN SERPL BCG-MCNC: 4.6 G/DL (ref 3.5–5.2)
ALP SERPL-CCNC: 52 U/L (ref 40–150)
ALT SERPL W P-5'-P-CCNC: 14 U/L (ref 0–50)
ANION GAP SERPL CALCULATED.3IONS-SCNC: 11 MMOL/L (ref 7–15)
AST SERPL W P-5'-P-CCNC: 23 U/L (ref 0–45)
BILIRUB SERPL-MCNC: 0.7 MG/DL
BUN SERPL-MCNC: 16.9 MG/DL (ref 6–20)
CALCIUM SERPL-MCNC: 9.9 MG/DL (ref 8.6–10)
CHLORIDE SERPL-SCNC: 104 MMOL/L (ref 98–107)
CHOLEST SERPL-MCNC: 208 MG/DL
CREAT SERPL-MCNC: 0.97 MG/DL (ref 0.51–0.95)
CREAT UR-MCNC: 105 MG/DL
DEPRECATED HCO3 PLAS-SCNC: 25 MMOL/L (ref 22–29)
EGFRCR SERPLBLD CKD-EPI 2021: 69 ML/MIN/1.73M2
ERYTHROCYTE [DISTWIDTH] IN BLOOD BY AUTOMATED COUNT: 11.9 % (ref 10–15)
FASTING STATUS PATIENT QL REPORTED: YES
GLUCOSE SERPL-MCNC: 100 MG/DL (ref 70–99)
HCT VFR BLD AUTO: 41.2 % (ref 35–47)
HDLC SERPL-MCNC: 59 MG/DL
HGB BLD-MCNC: 13.9 G/DL (ref 11.7–15.7)
LDLC SERPL CALC-MCNC: 137 MG/DL
MCH RBC QN AUTO: 30.9 PG (ref 26.5–33)
MCHC RBC AUTO-ENTMCNC: 33.7 G/DL (ref 31.5–36.5)
MCV RBC AUTO: 92 FL (ref 78–100)
MICROALBUMIN UR-MCNC: <12 MG/L
MICROALBUMIN/CREAT UR: NORMAL MG/G{CREAT}
NONHDLC SERPL-MCNC: 149 MG/DL
PLATELET # BLD AUTO: 215 10E3/UL (ref 150–450)
POTASSIUM SERPL-SCNC: 4.5 MMOL/L (ref 3.4–5.3)
PROT SERPL-MCNC: 7.3 G/DL (ref 6.4–8.3)
RBC # BLD AUTO: 4.5 10E6/UL (ref 3.8–5.2)
SODIUM SERPL-SCNC: 140 MMOL/L (ref 135–145)
TRIGL SERPL-MCNC: 61 MG/DL
TSH SERPL DL<=0.005 MIU/L-ACNC: 1.43 UIU/ML (ref 0.3–4.2)
WBC # BLD AUTO: 5.6 10E3/UL (ref 4–11)

## 2024-02-05 PROCEDURE — 84443 ASSAY THYROID STIM HORMONE: CPT

## 2024-02-05 PROCEDURE — 80053 COMPREHEN METABOLIC PANEL: CPT

## 2024-02-05 PROCEDURE — 80061 LIPID PANEL: CPT

## 2024-02-05 PROCEDURE — 85027 COMPLETE CBC AUTOMATED: CPT

## 2024-02-05 PROCEDURE — 82043 UR ALBUMIN QUANTITATIVE: CPT

## 2024-02-05 PROCEDURE — 82570 ASSAY OF URINE CREATININE: CPT

## 2024-02-05 PROCEDURE — 36415 COLL VENOUS BLD VENIPUNCTURE: CPT

## 2024-02-22 ENCOUNTER — OFFICE VISIT (OUTPATIENT)
Dept: FAMILY MEDICINE | Facility: CLINIC | Age: 54
End: 2024-02-22
Payer: COMMERCIAL

## 2024-02-22 VITALS
HEART RATE: 102 BPM | OXYGEN SATURATION: 100 % | BODY MASS INDEX: 23.2 KG/M2 | WEIGHT: 135.9 LBS | RESPIRATION RATE: 16 BRPM | TEMPERATURE: 97.5 F | DIASTOLIC BLOOD PRESSURE: 66 MMHG | SYSTOLIC BLOOD PRESSURE: 112 MMHG | HEIGHT: 64 IN

## 2024-02-22 DIAGNOSIS — R73.09 ELEVATED GLUCOSE LEVEL: ICD-10-CM

## 2024-02-22 DIAGNOSIS — E58 CALCIUM DEFICIENCY: ICD-10-CM

## 2024-02-22 DIAGNOSIS — Z12.31 VISIT FOR SCREENING MAMMOGRAM: ICD-10-CM

## 2024-02-22 DIAGNOSIS — Z01.419 ENCOUNTER FOR GYNECOLOGICAL EXAMINATION WITHOUT ABNORMAL FINDING: ICD-10-CM

## 2024-02-22 DIAGNOSIS — Z82.49 FAMILY HISTORY OF ISCHEMIC HEART DISEASE: ICD-10-CM

## 2024-02-22 DIAGNOSIS — E78.00 ELEVATED LDL CHOLESTEROL LEVEL: ICD-10-CM

## 2024-02-22 DIAGNOSIS — I10 ESSENTIAL HYPERTENSION WITH GOAL BLOOD PRESSURE LESS THAN 140/90: Primary | ICD-10-CM

## 2024-02-22 DIAGNOSIS — N95.1 SYMPTOMATIC MENOPAUSAL OR FEMALE CLIMACTERIC STATES: ICD-10-CM

## 2024-02-22 PROCEDURE — 99214 OFFICE O/P EST MOD 30 MIN: CPT | Performed by: FAMILY MEDICINE

## 2024-02-22 RX ORDER — PSEUDOEPHEDRINE HCL 30 MG
2-3 TABLET ORAL DAILY
COMMUNITY
Start: 2024-02-22

## 2024-02-22 RX ORDER — CANDESARTAN 8 MG/1
4 TABLET ORAL DAILY
Qty: 90 TABLET | Refills: 1 | Status: SHIPPED | OUTPATIENT
Start: 2024-02-22 | End: 2024-06-27

## 2024-02-22 NOTE — PATIENT INSTRUCTIONS
" [facility]  [address] [place] [address]  Office: [phone]   Fax:    [phone]       Return in about 1 year (around 2/22/2025) for blood pressure, w/ Dr Purcell.    Please,  continue your current medications and/or supplements and follow up as we discussed at your last visit.       Thank you so much or choosing [facility]  for your Health Care. It was a pleasure seeing you at your visit today! Please contact us with any questions or concerns you may have.                   Patti Purcell MD                              To reach your [facility] care team after hours call:   [phone] press #2 \"to speak with your care team\".  This will get you to our clinic instead of routing to central [facility]  scheduling.     PLEASE NOTE OUR HOURS HAVE CHANGED secondary to COVID-19 coronavirus pandemic, as we are trying to minimize patient exposure to the virus,  which is now widespread in the [facility].  These hours may change with very little notice.  We apologize for any inconvenience.       Our current clinic hours are:          Monday- Thursday   7:00am - 6:00pm  in person.      Friday  7:00am- 5:00pm                       Saturday and Sunday : Closed to in person and virtual visits        We have telephone and virtual visit times available between    7:00am - 6pm on Monday-Friday as well.                                                Phone:  [phone]      Our pharmacy hours: Monday through Friday 8:00am to 5:00pm                        Saturday - 9:00 am to 12 noon       Sunday : Closed.              Phone:  [phone]              ###  Please note: at this time we are not accepting any walk-in visits. ###      There is also information available at our web site:  [email or web address]    If your provider ordered any lab tests and you do not receive the results within 10 business days, please call the " clinic.    If you need a medication refill please contact your pharmacy.  Please allow 3 business days for your refill to be completed.    Our clinic offers telephone visits and e visits.  Please ask one of your team members to explain more.      Use [employer] (secure email communication and access to your chart) to send your primary care provider a message or make an appointment. Ask someone on your Team how to sign up for [employer].

## 2024-02-22 NOTE — PROGRESS NOTES
"[facility]  [address] [place] [id number]  Office: [phone]   Fax:    [phone]      Assessment & Plan       ICD-10-CM    1. Essential hypertension with goal blood pressure less than 140/90- well controlled today , and discussed  labs and med refills  I10 REVIEW OF HEALTH MAINTENANCE PROTOCOL ORDERS     candesartan (ATACAND) 8 MG tablet      2. Encounter for gynecological examination without abnormal finding- sees Dr. Sujey Hennessy [facility] ob/gyn for this once yearly  Z01.419       3. Visit for screening mammogram  Z12.31 MA Screening Bilateral w/ Mina      4. Elevated LDL cholesterol level- nonfasting - needs to be repeated fasting.  E78.00 Lipid panel reflex to direct LDL Fasting     AST     ALT      5. Calcium deficiency- on calcium supplementation - normal levels on labs - hx of kidney stones  E58 calcium citrate 250 MG TABS      6. Family history of ischemic heart disease - father's side of family - with normal lipids  Z82.49 CT Coronary Calcium Scan      7. Symptomatic menopausal or female climacteric states - on HRT through ob/gyn Dr. Sujey Hennessy  N95.1       8. Elevated glucose level- 100 - was not fasting day of labs - pt wishes repeat in 3--6 months  R73.09 Glucose         Return in about 1 year (around 2/22/2025) for blood pressure, w/ Dr Purcell.     Please,  continue your current medications and/or supplements and follow up as we discussed today.      Recheck fasting lipids , glucose, AST and ALT in 3-6 months as a lab only visit.   Future orders placed in Epic (our computer record)  for labs.     Ordering of each unique test  Prescription drug management  30 minutes spent by me on the date of the encounter doing chart review, history and exam, documentation and further activities per the note       BMI:   Estimated body mass index is 23.15 kg/m  as calculated from the following:    Height as of this encounter: 1.632 m (5' 4.25\").    Weight as of this " encounter: 61.6 kg (135 lb 14.4 oz).       MEDICATIONS:   Orders Placed This Encounter   Medications    calcium citrate 250 MG TABS     Sig: Take 2-3 tablets by mouth daily    candesartan (ATACAND) 8 MG tablet     Sig: Take 0.5 tablets (4 mg) by mouth daily     Dispense:  90 tablet     Refill:  1          - Continue other medications without change  Regular exercise  See Patient Instructions    No follow-ups on file.    Future Appointments 2/22/2024 - 8/20/2024        Date Visit Type Length Department Provider     4/2/2024  2:00 PM RETURN ADULT EYE 30 min EA OPTOMETRY Malorie Hinkle OD    Location Instructions:     The [facility] is located at [address] [place] [id number]                            Patti Purcell MD  [facility] [place]        Subjective  :   Evita Carson 53 year old female , presenting for the following health issues:   1. Essential hypertension with goal blood pressure less than 140/90- well controlled today , and discussed  labs and med refills    2. Encounter for gynecological examination without abnormal finding- sees Dr. Sujey Hennessy [facility] ob/gyn for this once yearly    3. Visit for screening mammogram    4. Elevated LDL cholesterol level- nonfasting - needs to be repeated fasting.    5. Calcium deficiency- on calcium supplementation - normal levels on labs - hx of kidney stones    6. Family history of ischemic heart disease - father's side of family - with normal lipids    7. Symptomatic menopausal or female climacteric states - on HRT through ob/gyn Dr. Sujey Hennessy    8. Elevated glucose level- 100 - was not fasting day of labs - pt wishes repeat in 3--6 months       Recheck Medication    No diagnosis found.     History of Present Illness       Hypertension: She presents for follow up of hypertension.  She does check blood pressure  regularly outside of the clinic. Outpatient blood pressures have not been over  140/90. She does not follow a low salt diet.     She eats 2-3 servings of fruits and vegetables daily.She consumes 0 sweetened beverage(s) daily.She exercises with enough effort to increase her heart rate 30 to 60 minutes per day.  She exercises with enough effort to increase her heart rate 6 days per week.   She is taking medications regularly.     About 1.5 wks ago started to cut her candesatan in half = 4mg total daily.   BP 's home = 110's/60's.   At dentist BP was 104/60's.      Would like to     Pt declines DEXA scan until age 63.  Doing calcium 750mg calcium citrate supplement and adds in 2 glasses of milk daily and vitamin D 2000iu.   In fall, winter, spring.   Started Pilates 3 weeks ago.   30-60 min cardio 5-6days/week.  Started to do strength training. As well about a year ago.           Patient Active Problem List   Diagnosis    Family history of ischemic heart disease - father's side of family - with normal lipids    Family history of ovarian cancer - following with Dr. Hennessy now - [facility] ob/gyn  - ? BSO in late 40's     CARDIOVASCULAR SCREENING; LDL GOAL LESS THAN 130    Essential hypertension with goal blood pressure less than 140/90    Chest pain, unspecified    Irritable bowel syndrome with diarrhea    Anisometropia    Vitamin D deficiency    Menopause    Symptomatic menopausal or female climacteric states    Recurrent postcoital urinary tract infection       Current Outpatient Medications   Medication Sig Dispense Refill    candesartan (ATACAND) 8 MG tablet TAKE 1 TABLET (8 MG) BY MOUTH DAILY 90 tablet 1    Cholecalciferol (VITAMIN D) 2000 UNITS CAPS Take by mouth daily       Coenzyme Q10 (COQ10) 50 MG CAPS Take 70 mg by mouth daily      estradiol (ESTRACE) 0.5 MG tablet Take 1 tablet (0.5 mg) by mouth daily 90 tablet 3    GLUCOSAMINE 500 MG OR CAPS 1 tab BID  0    MULTI-VITAMIN OR TABS 1 TABLET DAILY 30 0    OMEGA-3 CAPS   OR 2 caps Twice daily,  0    PROBIOTICA OR CHEW take one tablet daily  0     "progesterone (PROMETRIUM) 100 MG capsule 2 tablets daily on days 1-14 of cycle. 84 capsule 3          Allergies   Allergen Reactions    No Known Drug Allergy           Review of Systems :  Constitutional, HEENT, cardiovascular, pulmonary, GI, , musculoskeletal, neuro, skin, endocrine and psych systems are negative, except as otherwise noted.      Objective    /66   Pulse 102   Temp 97.5  F (36.4  C) (Tympanic)   Resp 16   Ht 1.632 m (5' 4.25\")   Wt 61.6 kg (135 lb 14.4 oz)   LMP 08/19/2020   SpO2 100%   BMI 23.15 kg/m    Body mass index is 23.15 kg/m .  Physical Exam   GENERAL: alert and no distress  EYES: Eyes grossly normal to inspection, PERRL and conjunctivae and sclerae normal  HENT: ear canals and TM's normal, nose and mouth without ulcers or lesions  NECK: no adenopathy, no asymmetry, masses, or scars  RESP: lungs clear to auscultation - no rales, rhonchi or wheezes  CV: regular rate and rhythm, normal S1 S2, no S3 or S4, no murmur, click or rub, no peripheral edema  ABDOMEN: soft, nontender, no hepatosplenomegaly, no masses and bowel sounds normal  MS: no gross musculoskeletal defects noted, no edema  SKIN: no suspicious lesions or rashes  NEURO: Normal strength and tone, mentation intact and speech normal  PSYCH: mentation appears normal, affect normal/bright    Lab on 02/05/2024   Component Date Value Ref Range Status    TSH 02/05/2024 1.43  0.30 - 4.20 uIU/mL Final    Cholesterol 02/05/2024 208 (H)  <200 mg/dL Final    Triglycerides 02/05/2024 61  <150 mg/dL Final    Direct Measure HDL 02/05/2024 59  >=50 mg/dL Final    LDL Cholesterol Calculated 02/05/2024 137 (H)  <=100 mg/dL Final    Non HDL Cholesterol 02/05/2024 149 (H)  <130 mg/dL Final    Patient Fasting > 8hrs? 02/05/2024 Yes   Final    Sodium 02/05/2024 140  135 - 145 mmol/L Final    Reference intervals for this test were updated on 09/26/2023 to more accurately reflect our healthy population. There may be differences in the " flagging of prior results with similar values performed with this method. Interpretation of those prior results can be made in the context of the updated reference intervals.     Potassium 02/05/2024 4.5  3.4 - 5.3 mmol/L Final    Carbon Dioxide (CO2) 02/05/2024 25  22 - 29 mmol/L Final    Anion Gap 02/05/2024 11  7 - 15 mmol/L Final    Urea Nitrogen 02/05/2024 16.9  6.0 - 20.0 mg/dL Final    Creatinine 02/05/2024 0.97 (H)  0.51 - 0.95 mg/dL Final    GFR Estimate 02/05/2024 69  >60 mL/min/1.73m2 Final    Calcium 02/05/2024 9.9  8.6 - 10.0 mg/dL Final    Chloride 02/05/2024 104  98 - 107 mmol/L Final    Glucose 02/05/2024 100 (H)  70 - 99 mg/dL Final    Alkaline Phosphatase 02/05/2024 52  40 - 150 U/L Final    Reference intervals for this test were updated on 11/14/2023 to more accurately reflect our healthy population. There may be differences in the flagging of prior results with similar values performed with this method. Interpretation of those prior results can be made in the context of the updated reference intervals.    AST 02/05/2024 23  0 - 45 U/L Final    Reference intervals for this test were updated on 6/12/2023 to more accurately reflect our healthy population. There may be differences in the flagging of prior results with similar values performed with this method. Interpretation of those prior results can be made in the context of the updated reference intervals.    ALT 02/05/2024 14  0 - 50 U/L Final    Reference intervals for this test were updated on 6/12/2023 to more accurately reflect our healthy population. There may be differences in the flagging of prior results with similar values performed with this method. Interpretation of those prior results can be made in the context of the updated reference intervals.      Protein Total 02/05/2024 7.3  6.4 - 8.3 g/dL Final    Albumin 02/05/2024 4.6  3.5 - 5.2 g/dL Final    Bilirubin Total 02/05/2024 0.7  <=1.2 mg/dL Final    WBC Count 02/05/2024 5.6  4.0 -  11.0 10e3/uL Final    RBC Count 02/05/2024 4.50  3.80 - 5.20 10e6/uL Final    Hemoglobin 02/05/2024 13.9  11.7 - 15.7 g/dL Final    Hematocrit 02/05/2024 41.2  35.0 - 47.0 % Final    MCV 02/05/2024 92  78 - 100 fL Final    MCH 02/05/2024 30.9  26.5 - 33.0 pg Final    MCHC 02/05/2024 33.7  31.5 - 36.5 g/dL Final    RDW 02/05/2024 11.9  10.0 - 15.0 % Final    Platelet Count 02/05/2024 215  150 - 450 10e3/uL Final    Creatinine Urine mg/dL 02/05/2024 105.0  mg/dL Final    The reference ranges have not been established in urine creatinine. The results should be integrated into the clinical context for interpretation.    Albumin Urine mg/L 02/05/2024 <12.0  mg/L Final    The reference ranges have not been established in urine albumin. The results should be integrated into the clinical context for interpretation.    Albumin Urine mg/g Cr 02/05/2024    Final    Unable to calculate, urine albumin and/or urine creatinine is outside detectable limits.  Microalbuminuria is defined as an albumin:creatinine ratio of 17 to 299 for males and 25 to 299 for females. A ratio of albumin:creatinine of 300 or higher is indicative of overt proteinuria.  Due to biologic variability, positive results should be confirmed by a second, first-morning random or 24-hour timed urine specimen. If there is discrepancy, a third specimen is recommended. When 2 out of 3 results are in the microalbuminuria range, this is evidence for incipient nephropathy and warrants increased efforts at glucose control, blood pressure control, and institution of therapy with an angiotensin-converting-enzyme (ACE) inhibitor (if the patient can tolerate it).         Discussed all the above with pt today in detail.

## 2024-03-23 ENCOUNTER — HEALTH MAINTENANCE LETTER (OUTPATIENT)
Age: 54
End: 2024-03-23

## 2024-04-02 ENCOUNTER — OFFICE VISIT (OUTPATIENT)
Dept: OPTOMETRY | Facility: CLINIC | Age: 54
End: 2024-04-02
Payer: COMMERCIAL

## 2024-04-02 DIAGNOSIS — H52.203 HYPEROPIA OF BOTH EYES WITH ASTIGMATISM: Primary | ICD-10-CM

## 2024-04-02 DIAGNOSIS — H52.4 PRESBYOPIA: ICD-10-CM

## 2024-04-02 DIAGNOSIS — H52.03 HYPEROPIA OF BOTH EYES WITH ASTIGMATISM: Primary | ICD-10-CM

## 2024-04-02 DIAGNOSIS — H52.31 ANISOMETROPIA: ICD-10-CM

## 2024-04-02 DIAGNOSIS — Z98.890 S/P LASIK SURGERY: ICD-10-CM

## 2024-04-02 PROCEDURE — 92015 DETERMINE REFRACTIVE STATE: CPT | Performed by: OPTOMETRIST

## 2024-04-02 PROCEDURE — 92014 COMPRE OPH EXAM EST PT 1/>: CPT | Performed by: OPTOMETRIST

## 2024-04-02 PROCEDURE — 92310 CONTACT LENS FITTING OU: CPT | Mod: GA | Performed by: OPTOMETRIST

## 2024-04-02 ASSESSMENT — CONF VISUAL FIELD
OD_SUPERIOR_NASAL_RESTRICTION: 0
OS_NORMAL: 1
OS_INFERIOR_NASAL_RESTRICTION: 0
OD_INFERIOR_NASAL_RESTRICTION: 0
OS_INFERIOR_TEMPORAL_RESTRICTION: 0
OD_SUPERIOR_TEMPORAL_RESTRICTION: 0
OS_SUPERIOR_NASAL_RESTRICTION: 0
OS_SUPERIOR_TEMPORAL_RESTRICTION: 0
METHOD: COUNTING FINGERS
OD_NORMAL: 1
OD_INFERIOR_TEMPORAL_RESTRICTION: 0

## 2024-04-02 ASSESSMENT — TONOMETRY
OD_IOP_MMHG: 17
IOP_METHOD: APPLANATION
OS_IOP_MMHG: 17

## 2024-04-02 ASSESSMENT — REFRACTION_MANIFEST
OD_AXIS: 107
OD_SPHERE: +5.25
OS_CYLINDER: +0.25
OD_ADD: +2.50
OS_SPHERE: +0.50
OS_ADD: +2.50
OS_AXIS: 085
OD_CYLINDER: +1.00

## 2024-04-02 ASSESSMENT — REFRACTION_CURRENTRX
OD_SPHERE: +6.00
OD_BRAND: MY DAY TORIC
OD_CYLINDER: -0.75
OD_DIAMETER: 14.3
OD_AXIS: 010
OD_BASECURVE: 8.6

## 2024-04-02 ASSESSMENT — EXTERNAL EXAM - LEFT EYE: OS_EXAM: NORMAL

## 2024-04-02 ASSESSMENT — VISUAL ACUITY
OD_CC+: -1
CORRECTION_TYPE: CONTACTS
OD_CC: 20/25
OS_SC: 20/80
METHOD: SNELLEN - LINEAR
OS_SC: 20/20
OD_CC: 20/80

## 2024-04-02 ASSESSMENT — CUP TO DISC RATIO
OD_RATIO: 0.4
OS_RATIO: 0.45

## 2024-04-02 ASSESSMENT — EXTERNAL EXAM - RIGHT EYE: OD_EXAM: NORMAL

## 2024-04-02 NOTE — PROGRESS NOTES
Chief Complaint   Patient presents with    Annual Eye Exam        Last Eye Exam: 03/2023  Dilated Previously: Yes, side effects of dilation explained today    What are you currently using to see?  readers and contacts - would like a contact lens eval today        Distance Vision Acuity: Satisfied with vision    Near Vision Acuity: Satisfied with vision while reading and using computer with readers    Eye Comfort: good  Do you use eye drops? : No      Lyn Lr - Optometric Assistant           Medical, surgical and family histories reviewed and updated 4/2/2024.       OBJECTIVE: See Ophthalmology exam    ASSESSMENT:    ICD-10-CM    1. Hyperopia of both eyes with astigmatism  H52.03     H52.203       2. Anisometropia  H52.31       3. Presbyopia  H52.4       4. S/P LASIK surgery - Left Eye  Z98.890         Residual hyperopia left eye s/p lasik   PLAN:   No prescription change in R eye  Update progressive addition lens   Malorie Hinkle OD

## 2024-04-02 NOTE — LETTER
4/2/2024         RE: Evita Carson  [address]  [place] [id number]        Dear Colleague,    Thank you for referring your patient, Evita Carson, to the [facility]. Please see a copy of my visit note below.    Chief Complaint   Patient presents with     Annual Eye Exam        Last Eye Exam: 03/2023  Dilated Previously: Yes, side effects of dilation explained today    What are you currently using to see?  readers and contacts - would like a contact lens eval today        Distance Vision Acuity: Satisfied with vision    Near Vision Acuity: Satisfied with vision while reading and using computer with readers    Eye Comfort: good  Do you use eye drops? : No      Lyn Lr - Optometric Assistant           Medical, surgical and family histories reviewed and updated 4/2/2024.       OBJECTIVE: See Ophthalmology exam    ASSESSMENT:    ICD-10-CM    1. Hyperopia of both eyes with astigmatism  H52.03     H52.203       2. Anisometropia  H52.31       3. Presbyopia  H52.4       4. S/P LASIK surgery - Left Eye  Z98.890         Residual hyperopia left eye s/p lasik   PLAN:   No prescription change in R eye  Update progressive addition lens   Malorie Hinkle OD     Again, thank you for allowing me to participate in the care of your patient.        Sincerely,        Malorie Hinkle, OD

## 2024-04-09 ENCOUNTER — HOSPITAL ENCOUNTER (OUTPATIENT)
Dept: CT IMAGING | Facility: CLINIC | Age: 54
Discharge: HOME | End: 2024-04-09
Attending: FAMILY MEDICINE | Admitting: FAMILY MEDICINE
Payer: COMMERCIAL

## 2024-04-09 DIAGNOSIS — Z82.49 FAMILY HISTORY OF ISCHEMIC HEART DISEASE: ICD-10-CM

## 2024-04-09 PROCEDURE — 75571 CT HRT W/O DYE W/CA TEST: CPT | Mod: 26 | Performed by: INTERNAL MEDICINE

## 2024-04-09 PROCEDURE — 75571 CT HRT W/O DYE W/CA TEST: CPT

## 2024-04-10 ENCOUNTER — MYC MEDICAL ADVICE (OUTPATIENT)
Dept: FAMILY MEDICINE | Facility: CLINIC | Age: 54
End: 2024-04-10
Payer: COMMERCIAL

## 2024-04-10 DIAGNOSIS — I25.10 CORONARY ARTERY CALCIFICATION SEEN ON CT SCAN: ICD-10-CM

## 2024-04-10 DIAGNOSIS — E78.00 PURE HYPERCHOLESTEROLEMIA: Primary | ICD-10-CM

## 2024-04-10 NOTE — RESULT ENCOUNTER NOTE
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    In reviewing your chart it appears that you have a family history of coronary artery disease but no personal history and cholesterol panel has been unremarkable/normal.  Your CT coronary scan does show some evidence of a possible cholesterol plaque presence in the large artery on the front of the heart (however, this score is not significantly elevated).  Since this is a noninvasive test it is not 100% accurate but it does give us a glimpse of any underlying coronary artery disease.      If you would like, we can place a referral for a preventative cardiology evaluation to see if any additional cardiac testing would be beneficial.  If you would like me to place this order please send a [employer] message and let me know.  Your overall risk for cardiovascular events is quite low based on multiple criteria in the calculation below.    There is an additional result that will come through "[employer]t for any noncardiac findings that were incidentally noted on this exam of which there were nothing notable to report.       If you have any questions please do not hesitate to contact our office via phone ([phone]) or [employer].    Healthy regards,     Erin Arango MBA, MS, PA-C  [facility]    The 10-year ASCVD risk score (Inés FRANK, et al., 2019) is: 2.2%    Values used to calculate the score:      Age: 54 years      Sex: Female      Is Non- : No      Diabetic: No      Tobacco smoker: No      Systolic Blood Pressure: 112 mmHg      Is BP treated: Yes      HDL Cholesterol: 59 mg/dL      Total Cholesterol: 208 mg/dL

## 2024-04-10 NOTE — RESULT ENCOUNTER NOTE
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    In reviewing your chart it appears that you have a family history of coronary artery disease but no personal history and cholesterol panel has been unremarkable/normal.  Your CT coronary scan does show some evidence of a possible cholesterol plaque presence in the large artery on the front of the heart (however, this score is not significantly elevated).  Since this is a noninvasive test it is not 100% accurate but it does give us a glimpse of any underlying coronary artery disease.      If you would like, we can place a referral for a preventative cardiology evaluation to see if any additional cardiac testing would be beneficial.  If you would like me to place this order please send a [employer] message and let me know.  Your overall risk for cardiovascular events is quite low based on multiple criteria in the calculation below.    There is an additional result that will come through [employer] for any noncardiac findings that were incidentally noted on this exam of which there were nothing notable to report.       If you have any questions please do not hesitate to contact our office via phone ([phone]) or [employer].    Healthy regards,     Erin Arango MBA, MS, PA-C  [facility]    The 10-year ASCVD risk score (Inés FRANK, et al., 2019) is: 2.2%    Values used to calculate the score:      Age: 54 years      Sex: Female      Is Non- : No      Diabetic: No      Tobacco smoker: No      Systolic Blood Pressure: 112 mmHg      Is BP treated: Yes      HDL Cholesterol: 59 mg/dL      Total Cholesterol: 208 mg/dL

## 2024-04-12 NOTE — TELEPHONE ENCOUNTER
Please review message     2/5/24- result note.   Pt was not fasting at time of her labs and took her fish oil supplement - will repeat her fasting lipids.     Patient has future lipid orders.     Cholesterol   Date Value Ref Range Status   02/05/2024 208 (H) <200 mg/dL Final   12/16/2020 197 <200 mg/dL Final

## 2024-04-13 PROBLEM — I25.10 CORONARY ARTERY CALCIFICATION SEEN ON CT SCAN: Status: ACTIVE | Noted: 2024-04-13

## 2024-04-13 RX ORDER — ROSUVASTATIN CALCIUM 10 MG/1
10 TABLET, COATED ORAL DAILY
Qty: 90 TABLET | Refills: 3 | Status: SHIPPED | OUTPATIENT
Start: 2024-04-13

## 2024-05-10 ENCOUNTER — OFFICE VISIT (OUTPATIENT)
Dept: CARDIOLOGY | Facility: CLINIC | Age: 54
End: 2024-05-10
Payer: COMMERCIAL

## 2024-05-10 VITALS
HEIGHT: 64 IN | HEART RATE: 64 BPM | DIASTOLIC BLOOD PRESSURE: 76 MMHG | BODY MASS INDEX: 22.79 KG/M2 | WEIGHT: 133.5 LBS | SYSTOLIC BLOOD PRESSURE: 122 MMHG

## 2024-05-10 DIAGNOSIS — E78.00 PURE HYPERCHOLESTEROLEMIA: ICD-10-CM

## 2024-05-10 DIAGNOSIS — I25.10 CORONARY ARTERY CALCIFICATION SEEN ON CT SCAN: ICD-10-CM

## 2024-05-10 PROCEDURE — 93000 ELECTROCARDIOGRAM COMPLETE: CPT | Performed by: INTERNAL MEDICINE

## 2024-05-10 PROCEDURE — 99204 OFFICE O/P NEW MOD 45 MIN: CPT | Performed by: INTERNAL MEDICINE

## 2024-05-10 NOTE — LETTER
5/10/2024    Patti Purcell MD  [address] [facility] [address]    RE: Evita PEDERSON Yamileth       Dear Colleague,     I had the pleasure of seeing Evita BG Carson in the [facility].  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  The patient was seen in 2017 by one of my cardiology partners for mild hypertension for which she takes candesartan and preventative cardiovascular care given her strong family history of cardiovascular disease on her paternal side.  The patient had a calcium scan at that time which was 0.  She has borderline hyperlipidemia with LDL levels in the 110-140 range.    The patient is overall very healthy and functionally fit and exercises over 1 hour a day.  She has absolutely no cardiac symptoms.  No chest pain heaviness pressure syncope presyncope.  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    She underwent a another screening calcium score in April 2024 that was positive at 31 with calcium being in the LAD.  She started 10 mg of Crestor.  Again she is completely asymptomatic from a cardiac standpoint.    PAST MEDICAL HISTORY:  Past Medical History:   Diagnosis Date    Calculus of kidney 7/1996    Calcium stone - etiology after w/u was excess calcium intake    Fam hx-cardiovas dis NEC     father with MI at age 60 yo, s/p PTCA    Family history of ovarian cancer - following with Dr. Stuart - ? BSO in late 40's      Fertility testing 94-01    used lifetime total of 11 months of Clomid and 2 months of gonadotropins    Hypertension 2017    Volvulus (H) child    Resolved without surgery - hospitalized for 1 day       MEDICATIONS:  Current Outpatient Medications   Medication Sig Dispense Refill    calcium citrate 250 MG TABS Take 2-3 tablets by mouth  daily (Patient taking differently: Take 1-2 tablets by mouth daily)      candesartan (ATACAND) 8 MG tablet Take 0.5 tablets (4 mg) by mouth daily 90 tablet 1    Coenzyme Q10 (COQ10) 50 MG CAPS Take 70 mg by mouth daily      estradiol (ESTRACE) 0.5 MG tablet Take 1 tablet (0.5 mg) by mouth daily 90 tablet 3    GLUCOSAMINE 500 MG OR CAPS Take by mouth daily  0    MULTI-VITAMIN OR TABS 1 TABLET DAILY 30 0    OMEGA-3 CAPS   OR Take by mouth daily EPA  0    PROBIOTICA OR CHEW take one tablet daily  0    progesterone (PROMETRIUM) 100 MG capsule 2 tablets daily on days 1-14 of cycle. 84 capsule 3    rosuvastatin (CRESTOR) 10 MG tablet Take 1 tablet (10 mg) by mouth daily 90 tablet 3    Vitamin D-Vitamin K (VITAMIN K2-VITAMIN D3 PO) Take by mouth daily       No current facility-administered medications for this visit.       SOCIAL HISTORY:  I have reviewed this patient's social history and updated it with pertinent information if needed. Evita Carson  reports that she has never smoked. She has never used smokeless tobacco. She reports current alcohol use. She reports that she does not use drugs.    PHYSICAL EXAM:  Pulse:  [64] 64  BP: (122)/(76) 122/76  133 lbs 8 oz    Constitutional: alert, no distress  Respiratory: Good bilateral air entry  Cardiovascular: Normal regular heart sounds no carotid bruits no edema  GI: nondistended  Neuropsychiatric: appropriate affact    ASSESSMENT: Pertinent issues addressed/ reviewed during this cardiology visit  Positive calcium score  Hypertension    RECOMMENDATIONS:  Hypertension seems to be under excellent control.  She is working with her PCP for that.  We discussed extensively about pathophysiology of nonobstructive coronary artery disease and ACS.  Patient is asymptomatic.  Calcium score is low at 31 but given her young age and her strong family history, aggressive cardiovascular risk factor modification is recommended.  I agree with 10 mg of Crestor at this time.  I would recommend  checking lipid panel along with LP(a) levels CK and hepatic function panel in 4 months from now.  I would recommend try to keep LDL in the 50-70 range.  If it does not get there, recommend increasing rosuvastatin to 20 mg daily.  If there is any change in clinical status or she gets symptomatic from a cardiac standpoint she needs to notify us immediately and seek medical care.  Advised her to check in with her PCP and get carotid ultrasound screening.  Follow-up with cardiology on a yearly basis sooner if anything changes clinically.    Healthy diet symptom limited exercise.  ECG from today is normal sinus rhythm.    It was a pleasure seeing this patient in clinic today. Please do not hesitate to contact me with any future questions.     YOHAN Robles, [facility]  Cardiology - [facility]  May 10, 2024    Review of the result(s) of each unique test - Last CBC BMP lipids ECG calcium score     The level of medical decision making during this visit was of moderate complexity.    This note was completed in part using dictation via the Dragon voice recognition software. Some word and grammatical errors may occur and must be interpreted in the appropriate clinical context.  If there are any questions pertaining to this issue, please contact me for further clarification.      Thank you for allowing me to participate in the care of your patient.      Sincerely,     Arturo Heck MD     [facility]  cc:   Arturo Heck MD  [address] [facility] [address] [place] [id number]

## 2024-05-10 NOTE — PROGRESS NOTES
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  The patient was seen in 2017 by one of my cardiology partners for mild hypertension for which she takes candesartan and preventative cardiovascular care given her strong family history of cardiovascular disease on her paternal side.  The patient had a calcium scan at that time which was 0.  She has borderline hyperlipidemia with LDL levels in the 110-140 range.    The patient is overall very healthy and functionally fit and exercises over 1 hour a day.  She has absolutely no cardiac symptoms.  No chest pain heaviness pressure syncope presyncope.  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    She underwent a another screening calcium score in April 2024 that was positive at 31 with calcium being in the LAD.  She started 10 mg of Crestor.  Again she is completely asymptomatic from a cardiac standpoint.    PAST MEDICAL HISTORY:  Past Medical History:   Diagnosis Date    Calculus of kidney 7/1996    Calcium stone - etiology after w/u was excess calcium intake    Fam hx-cardiovas dis NEC     father with MI at age 60 yo, s/p PTCA    Family history of ovarian cancer - following with Dr. Stuart - ? BSO in late 40's      Fertility testing 94-01    used lifetime total of 11 months of Clomid and 2 months of gonadotropins    Hypertension 2017    Volvulus (H) child    Resolved without surgery - hospitalized for 1 day       MEDICATIONS:  Current Outpatient Medications   Medication Sig Dispense Refill    calcium citrate 250 MG TABS Take 2-3 tablets by mouth daily (Patient taking differently: Take 1-2 tablets by mouth daily)      candesartan (ATACAND) 8 MG tablet Take 0.5 tablets (4 mg) by mouth daily 90 tablet 1    Coenzyme Q10 (COQ10) 50 MG CAPS Take 70 mg by mouth  daily      estradiol (ESTRACE) 0.5 MG tablet Take 1 tablet (0.5 mg) by mouth daily 90 tablet 3    GLUCOSAMINE 500 MG OR CAPS Take by mouth daily  0    MULTI-VITAMIN OR TABS 1 TABLET DAILY 30 0    OMEGA-3 CAPS   OR Take by mouth daily EPA  0    PROBIOTICA OR CHEW take one tablet daily  0    progesterone (PROMETRIUM) 100 MG capsule 2 tablets daily on days 1-14 of cycle. 84 capsule 3    rosuvastatin (CRESTOR) 10 MG tablet Take 1 tablet (10 mg) by mouth daily 90 tablet 3    Vitamin D-Vitamin K (VITAMIN K2-VITAMIN D3 PO) Take by mouth daily       No current facility-administered medications for this visit.       SOCIAL HISTORY:  I have reviewed this patient's social history and updated it with pertinent information if needed. Evita Carson  reports that she has never smoked. She has never used smokeless tobacco. She reports current alcohol use. She reports that she does not use drugs.    PHYSICAL EXAM:  Pulse:  [64] 64  BP: (122)/(76) 122/76  133 lbs 8 oz    Constitutional: alert, no distress  Respiratory: Good bilateral air entry  Cardiovascular: Normal regular heart sounds no carotid bruits no edema  GI: nondistended  Neuropsychiatric: appropriate affact    ASSESSMENT: Pertinent issues addressed/ reviewed during this cardiology visit  Positive calcium score  Hypertension    RECOMMENDATIONS:  Hypertension seems to be under excellent control.  She is working with her PCP for that.  We discussed extensively about pathophysiology of nonobstructive coronary artery disease and ACS.  Patient is asymptomatic.  Calcium score is low at 31 but given her young age and her strong family history, aggressive cardiovascular risk factor modification is recommended.  I agree with 10 mg of Crestor at this time.  I would recommend checking lipid panel along with LP(a) levels CK and hepatic function panel in 4 months from now.  I would recommend try to keep LDL in the 50-70 range.  If it does not get there, recommend increasing rosuvastatin  to 20 mg daily.  If there is any change in clinical status or she gets symptomatic from a cardiac standpoint she needs to notify us immediately and seek medical care.  Advised her to check in with her PCP and get carotid ultrasound screening.  Follow-up with cardiology on a yearly basis sooner if anything changes clinically.    Healthy diet symptom limited exercise.  ECG from today is normal sinus rhythm.    It was a pleasure seeing this patient in clinic today. Please do not hesitate to contact me with any future questions.     YOHAN Robles, [facility]  Cardiology - [facility]  May 10, 2024    Review of the result(s) of each unique test - Last CBC BMP lipids ECG calcium score     The level of medical decision making during this visit was of moderate complexity.    This note was completed in part using dictation via the Dragon voice recognition software. Some word and grammatical errors may occur and must be interpreted in the appropriate clinical context.  If there are any questions pertaining to this issue, please contact me for further clarification.

## 2024-05-15 ENCOUNTER — CARE COORDINATION (OUTPATIENT)
Dept: CARDIOLOGY | Facility: CLINIC | Age: 54
End: 2024-05-15
Payer: COMMERCIAL

## 2024-05-15 DIAGNOSIS — E78.00 PURE HYPERCHOLESTEROLEMIA: Primary | ICD-10-CM

## 2024-05-15 NOTE — PROGRESS NOTES
Order  Received: Today  Mary Altamirano [facility] Team 7  Pt states that Maria R wanted a carotid US? I don't see orders.    Received inquiry on orders from scheduling. Per Dr Heck's note on 5/10/24: Advised her to check in with her PCP and get carotid ultrasound screening.    I sent pt a mychart advising that Dr Heck recommended she go through her PCP to order a carotid US. Mariola Jay RN on 5/15/2024 at 11:16 AM

## 2024-06-25 ENCOUNTER — LAB (OUTPATIENT)
Dept: LAB | Facility: CLINIC | Age: 54
End: 2024-06-25
Payer: COMMERCIAL

## 2024-06-25 ENCOUNTER — TELEPHONE (OUTPATIENT)
Dept: FAMILY MEDICINE | Facility: CLINIC | Age: 54
End: 2024-06-25

## 2024-06-25 DIAGNOSIS — R39.9 URINARY SYMPTOM OR SIGN: ICD-10-CM

## 2024-06-25 DIAGNOSIS — R39.9 URINARY SYMPTOM OR SIGN: Primary | ICD-10-CM

## 2024-06-25 LAB
ALBUMIN UR-MCNC: NEGATIVE MG/DL
APPEARANCE UR: CLEAR
BILIRUB UR QL STRIP: NEGATIVE
COLOR UR AUTO: YELLOW
GLUCOSE UR STRIP-MCNC: NEGATIVE MG/DL
HGB UR QL STRIP: NEGATIVE
KETONES UR STRIP-MCNC: NEGATIVE MG/DL
LEUKOCYTE ESTERASE UR QL STRIP: NEGATIVE
NITRATE UR QL: NEGATIVE
PH UR STRIP: 6 [PH] (ref 5–7)
SP GR UR STRIP: <=1.005 (ref 1–1.03)
UROBILINOGEN UR STRIP-ACNC: 0.2 E.U./DL

## 2024-06-25 PROCEDURE — 81003 URINALYSIS AUTO W/O SCOPE: CPT

## 2024-06-25 NOTE — TELEPHONE ENCOUNTER
Order/Referral Request    Who is requesting: UA order for Dr. Banerjee     Orders being requested: UA    Reason service is needed/diagnosis: She will let you know    When are orders needed by: today    Recurrent UTI    Emelia Nash, TC

## 2024-06-27 ENCOUNTER — VIRTUAL VISIT (OUTPATIENT)
Dept: FAMILY MEDICINE | Facility: CLINIC | Age: 54
End: 2024-06-27
Payer: COMMERCIAL

## 2024-06-27 DIAGNOSIS — M79.10 MYALGIA: ICD-10-CM

## 2024-06-27 DIAGNOSIS — R07.89 ATYPICAL CHEST PAIN: ICD-10-CM

## 2024-06-27 DIAGNOSIS — Z82.49 FAMILY HISTORY OF CAROTID ARTERY STENOSIS: Primary | ICD-10-CM

## 2024-06-27 DIAGNOSIS — Z82.49 FAMILY HISTORY OF CAROTID ENDARTERECTOMY: ICD-10-CM

## 2024-06-27 DIAGNOSIS — E78.5 HYPERLIPIDEMIA LDL GOAL <130: ICD-10-CM

## 2024-06-27 DIAGNOSIS — I10 ESSENTIAL HYPERTENSION WITH GOAL BLOOD PRESSURE LESS THAN 140/90: ICD-10-CM

## 2024-06-27 PROCEDURE — 99214 OFFICE O/P EST MOD 30 MIN: CPT | Mod: 95 | Performed by: FAMILY MEDICINE

## 2024-06-27 RX ORDER — ASPIRIN 81 MG/1
81 TABLET ORAL DAILY
Qty: 30 TABLET | Refills: 3 | Status: SHIPPED | OUTPATIENT
Start: 2024-06-27

## 2024-06-27 RX ORDER — ASPIRIN 81 MG/1
81 TABLET, CHEWABLE ORAL DAILY
COMMUNITY
Start: 2024-06-27

## 2024-06-27 RX ORDER — CANDESARTAN 8 MG/1
8 TABLET ORAL DAILY
Qty: 90 TABLET | Refills: 1 | Status: SHIPPED | OUTPATIENT
Start: 2024-06-27

## 2024-06-27 NOTE — PROGRESS NOTES
[facility]  [address] [place] [address]  Office: [phone]   Fax:    [phone]   Evita is a 54 year old who is being evaluated via a billable video visit.    How would you like to obtain your AVS? MyChart  If the video visit is dropped, the invitation should be resent by: Text to cell phone: [phone]  Will anyone else be joining your video visit? No          Assessment & Plan :       ICD-10-CM    1. Family history of carotid artery stenosis  Z82.49 US Carotid Bilateral     aspirin (ASPIRIN LOW DOSE) 81 MG EC tablet     aspirin (ASA) 81 MG chewable tablet      2. Essential hypertension with goal blood pressure less than 140/90- well controlled today , and discussed  labs and med refills  I10 US Carotid Bilateral     Echocardiogram Exercise Stress     aspirin (ASA) 81 MG chewable tablet     candesartan (ATACAND) 8 MG tablet      3. Family history of carotid endarterectomy  Z82.49 US Carotid Bilateral     aspirin (ASPIRIN LOW DOSE) 81 MG EC tablet     aspirin (ASA) 81 MG chewable tablet      4. Hyperlipidemia LDL goal <130  E78.5 US Carotid Bilateral     Echocardiogram Exercise Stress      5. Myalgia  M79.10       6. Atypical chest pain  R07.89 Echocardiogram Exercise Stress     aspirin (ASPIRIN LOW DOSE) 81 MG EC tablet     aspirin (ASA) 81 MG chewable tablet         Continue with rosuvastatin 10mg nightly.     Consider   Femring 0.05 mg estradiol per day over 3 months, 0.1 mg estradiol per day over 3 months   For vasomotor symptoms for ERT portion of HRT, if oral doesn't work well or pt desires another option. Estring doesn't have sufficient dose for vasomotor symptoms, just enough for atrophic vaginitis treatment.     Ordering of each unique test  Prescription drug management  19 minutes face to face time on video.   23 minutes spent by me on the date of the encounter doing chart review, history and exam, documentation and further activities per the note       Pt  "declines referral for DEXA at this time.    BMI:   Estimated body mass index is 22.74 kg/m  as calculated from the following:    Height as of 5/10/24: 1.632 m (5' 4.25\").    Weight as of 5/10/24: 60.6 kg (133 lb 8 oz).       MEDICATIONS:   Orders Placed This Encounter   Medications    aspirin (ASPIRIN LOW DOSE) 81 MG EC tablet     Sig: Take 1 tablet (81 mg) by mouth daily     Dispense:  30 tablet     Refill:  3    aspirin (ASA) 81 MG chewable tablet     Sig: Take 1 tablet (81 mg) by mouth daily    candesartan (ATACAND) 8 MG tablet     Sig: Take 1 tablet (8 mg) by mouth daily     Dispense:  90 tablet     Refill:  1          - Continue other medications without change  Regular exercise  See Patient Instructions    Return in about 8 months (around 2/15/2025) for Wellness/Preventative Visit, blood pressure, cholesterol, w/ Dr. BERNSTEIN for 40 min appt.  - gets annual gyn exams done with Sujey Hennessy, ob/gyn.     Future Appointments 6/27/2024 - 12/24/2024        Date Visit Type Length Department Provider     6/27/2024  4:40 PM OFFICE VISIT 40 min  FAMILY PRACTICE Patti Purcell MD    Location Instructions:     [facility] is located at [address] [place] [address], along [address] Free parking is available; access the lot by turning north from [address] onto [facility], then west onto [facility].              7/25/2024  8:45 AM MYC 3D SCREENING MAMMOGRAM 15 min  BREAST [facility]ENTER [email or web address]    Location Instructions:     [facility] Breast [facility] [address], [address] [place] [address]  Parking Breast Center customers can park in the lot adjacent to the entrance to the [facility].  Entrance and check-in location Enter at the front entrance, under the canopy. Take the stairs or elevator from the main entrance to the 2nd floor. Please check-in for your appointment at the desk in the Breast Center " waiting area.  This appointment is in a hospital-based location.&nbsp; Before your visit, you may want to check with your insurance company for coverage and referral options, including cost differences between services provided in different clinic settings.&nbsp; For more information visit this link on the [employer] Website:&nbsp; [email or web address]              9/12/2024  9:00 AM LAB 15 min RV LABORATORY RV LAB    Location Instructions:     The clinic is located at [address] [place] [address] in [place].&nbsp; We offer free parking in our on-site lot.                            Patti Purcell MD  [facility]        Subjective   Evita Carson 54 year old female , presenting for the following health issues:  RECHECK    1. Family history of carotid artery stenosis    2. Essential hypertension with goal blood pressure less than 140/90- well controlled today , and discussed  labs and med refills    3. Family history of carotid endarterectomy    4. Hyperlipidemia LDL goal <130    5. Myalgia    6. Atypical chest pain           History of Present Illness       Hyperlipidemia:  She presents for follow up of hyperlipidemia.   She is taking medication to lower cholesterol. She is not having myalgia or other side effects to statin medications.    Hypertension: She presents for follow up of hypertension.  She does check blood pressure  regularly outside of the clinic. Outpatient blood pressures have not been over 140/90. She does not follow a low salt diet.     She eats 4 or more servings of fruits and vegetables daily.She consumes 0 sweetened beverage(s) daily.She exercises with enough effort to increase her heart rate 30 to 60 minutes per day.  She exercises with enough effort to increase her heart rate 5 days per week.   She is taking medications regularly.     Getting some muscle aches from working out, doesn't think they are any worse since starting on rosuvastatin.     Was taking  "1/2 of a 8mg candesartan - now taking full 8mg  tab secondary to cardiology's recommendations. Needs new rx sent to pharmacy. No problems or side effects.     Saw Cardiology - he recommended carotid US and perhaps an 81mg ASA daily given CT calcium score below and family history.   Getting some pectoralis muscle pain  and chest ache or pressure middle to left sided chest discomfort 2-3/10 - mostly at rest in the evenings. Happens several times a month.  Never with exertion.  Worried about  CAD and ischemia  secondary to family history. No shortness of breath , no diaphoresis.   Desires stress echocardiogram.    She works out a lot, but doesn't get any chest discomfort at all during that time.     Had a CT calcium screening.    = \"IMPRESSIONS:  1.  Mild coronary calcifications.  2.  The total Agatston calcium score is 30.9 placing the patient in  the 87th percentile when compared to age and gender matched control  group.  3.  Recommend aggressive risk factor modification.  4.  Please review Radiology report for incidental noncardiac findings  that will follow separately.      FINDINGS:     CORONARY ARTERY CALCIUM SCORES:   Total calcium score: 30.9  Left main coronary artery: 0  Left anterior descending coronary artery: 30.9  Circumflex coronary artery: 0  Right coronary artery: 0\"         Patient Active Problem List   Diagnosis    Family history of ischemic heart disease - father's side of family - with normal lipids    Family history of ovarian cancer - following with Dr. Hennessy now - [facility] ob/gyn  - ? BSO in late 40's -mother negative on genetic testing    CARDIOVASCULAR SCREENING; LDL GOAL LESS THAN 130    Essential hypertension with goal blood pressure less than 140/90    Chest pain, unspecified    Irritable bowel syndrome with diarrhea    Anisometropia    Vitamin D deficiency    Menopause    Symptomatic menopausal or female climacteric states    Recurrent postcoital urinary tract infection    Encounter for " gynecological examination without abnormal finding- sees Dr. Sujey Hennessy [facility] ob/gyn    Pure hypercholesterolemia    Calcium deficiency- on calcium supplementation - normal levels on labs - hx of kidney stones    Coronary artery calcification seen on CT scan       Current Outpatient Medications   Medication Sig Dispense Refill    calcium citrate 250 MG TABS Take 2-3 tablets by mouth daily (Patient taking differently: Take 1-2 tablets by mouth daily)      candesartan (ATACAND) 8 MG tablet Take 0.5 tablets (4 mg) by mouth daily 90 tablet 1    Coenzyme Q10 (COQ10) 50 MG CAPS Take 70 mg by mouth daily      estradiol (ESTRACE) 0.5 MG tablet Take 1 tablet (0.5 mg) by mouth daily 90 tablet 3    GLUCOSAMINE 500 MG OR CAPS Take by mouth daily  0    MULTI-VITAMIN OR TABS 1 TABLET DAILY 30 0    OMEGA-3 CAPS   OR Take by mouth daily EPA  0    PROBIOTICA OR CHEW take one tablet daily  0    progesterone (PROMETRIUM) 100 MG capsule 2 tablets daily on days 1-14 of cycle. 84 capsule 3    rosuvastatin (CRESTOR) 10 MG tablet Take 1 tablet (10 mg) by mouth daily 90 tablet 3    Vitamin D-Vitamin K (VITAMIN K2-VITAMIN D3 PO) Take by mouth daily            Allergies   Allergen Reactions    No Known Drug Allergy           Review of Systems   Constitutional, HEENT, cardiovascular, pulmonary, GI, , musculoskeletal, neuro, skin, endocrine and psych systems are negative, except as otherwise noted.      Objective    LMP 08/19/2020   There is no height or weight on file to calculate BMI.    Vitals:  No vitals were obtained today due to virtual visit.    Physical Exam   GENERAL: alert and no distress  EYES: Eyes grossly normal to inspection.  No discharge or erythema, or obvious scleral/conjunctival abnormalities.  RESP: No audible wheeze, cough, or visible cyanosis.    SKIN: Visible skin clear. No significant rash, abnormal pigmentation or lesions.  NEURO: Cranial nerves grossly intact.  Mentation and speech appropriate for age.  PSYCH:  Appropriate affect, tone, and pace of words    CBC RESULTS:   Recent Labs   Lab Test 02/05/24  1030   WBC 5.6   RBC 4.50   HGB 13.9   HCT 41.2   MCV 92   MCH 30.9   MCHC 33.7   RDW 11.9          Recent Labs   Lab Test 02/05/24  1030 06/20/22  0817   CHOL 208* 172   HDL 59 62   * 93   TRIG 61 86     Last Comprehensive Metabolic Panel:  Sodium   Date Value Ref Range Status   02/05/2024 140 135 - 145 mmol/L Final     Comment:     Reference intervals for this test were updated on 09/26/2023 to more accurately reflect our healthy population. There may be differences in the flagging of prior results with similar values performed with this method. Interpretation of those prior results can be made in the context of the updated reference intervals.    12/16/2020 140 133 - 144 mmol/L Final     Potassium   Date Value Ref Range Status   02/05/2024 4.5 3.4 - 5.3 mmol/L Final   06/20/2022 3.7 3.4 - 5.3 mmol/L Final   12/16/2020 4.0 3.4 - 5.3 mmol/L Final     Chloride   Date Value Ref Range Status   02/05/2024 104 98 - 107 mmol/L Final   06/20/2022 107 94 - 109 mmol/L Final   12/16/2020 110 (H) 94 - 109 mmol/L Final     Carbon Dioxide   Date Value Ref Range Status   12/16/2020 27 20 - 32 mmol/L Final     Carbon Dioxide (CO2)   Date Value Ref Range Status   02/05/2024 25 22 - 29 mmol/L Final   06/20/2022 27 20 - 32 mmol/L Final     Anion Gap   Date Value Ref Range Status   02/05/2024 11 7 - 15 mmol/L Final   06/20/2022 6 3 - 14 mmol/L Final   12/16/2020 3 3 - 14 mmol/L Final     Glucose   Date Value Ref Range Status   02/05/2024 100 (H) 70 - 99 mg/dL Final   06/20/2022 92 70 - 99 mg/dL Final   12/16/2020 84 70 - 99 mg/dL Final     Comment:     Non Fasting     Urea Nitrogen   Date Value Ref Range Status   02/05/2024 16.9 6.0 - 20.0 mg/dL Final   06/20/2022 17 7 - 30 mg/dL Final   12/16/2020 23 7 - 30 mg/dL Final     Creatinine   Date Value Ref Range Status   02/05/2024 0.97 (H) 0.51 - 0.95 mg/dL Final   12/16/2020 0.85  0.52 - 1.04 mg/dL Final     GFR Estimate   Date Value Ref Range Status   02/05/2024 69 >60 mL/min/1.73m2 Final   12/16/2020 79 >60 mL/min/[1.73_m2] Final     Comment:     Non  GFR Calc  Starting 12/18/2018, serum creatinine based estimated GFR (eGFR) will be   calculated using the Chronic Kidney Disease Epidemiology Collaboration   (CKD-EPI) equation.       Calcium   Date Value Ref Range Status   02/05/2024 9.9 8.6 - 10.0 mg/dL Final   12/16/2020 9.1 8.5 - 10.1 mg/dL Final     Bilirubin Total   Date Value Ref Range Status   02/05/2024 0.7 <=1.2 mg/dL Final   12/16/2020 0.4 0.2 - 1.3 mg/dL Final     Alkaline Phosphatase   Date Value Ref Range Status   02/05/2024 52 40 - 150 U/L Final     Comment:     Reference intervals for this test were updated on 11/14/2023 to more accurately reflect our healthy population. There may be differences in the flagging of prior results with similar values performed with this method. Interpretation of those prior results can be made in the context of the updated reference intervals.   12/16/2020 64 40 - 150 U/L Final     ALT   Date Value Ref Range Status   02/05/2024 14 0 - 50 U/L Final     Comment:     Reference intervals for this test were updated on 6/12/2023 to more accurately reflect our healthy population. There may be differences in the flagging of prior results with similar values performed with this method. Interpretation of those prior results can be made in the context of the updated reference intervals.     12/16/2020 31 0 - 50 U/L Final     AST   Date Value Ref Range Status   02/05/2024 23 0 - 45 U/L Final     Comment:     Reference intervals for this test were updated on 6/12/2023 to more accurately reflect our healthy population. There may be differences in the flagging of prior results with similar values performed with this method. Interpretation of those prior results can be made in the context of the updated reference intervals.   12/16/2020 22 0 - 45  U/L Final           Video-Visit Details    Type of service:  Video Visit   Originating Location (pt. Location): Home    Distant Location (provider location):  On-site  Platform used for Video Visit: Syl  Signed Electronically by: Patti Purcell MD   Stop time: 5:34pm.     Lab on 06/25/2024   Component Date Value Ref Range Status    Color Urine 06/25/2024 Yellow  Colorless, Straw, Light Yellow, Yellow Final    Appearance Urine 06/25/2024 Clear  Clear Final    Glucose Urine 06/25/2024 Negative  Negative mg/dL Final    Bilirubin Urine 06/25/2024 Negative  Negative Final    Ketones Urine 06/25/2024 Negative  Negative mg/dL Final    Specific Gravity Urine 06/25/2024 <=1.005  1.003 - 1.035 Final    Blood Urine 06/25/2024 Negative  Negative Final    pH Urine 06/25/2024 6.0  5.0 - 7.0 Final    Protein Albumin Urine 06/25/2024 Negative  Negative mg/dL Final    Urobilinogen Urine 06/25/2024 0.2  0.2, 1.0 E.U./dL Final    Nitrite Urine 06/25/2024 Negative  Negative Final    Leukocyte Esterase Urine 06/25/2024 Negative  Negative Final               Answers submitted by the patient for this visit:  Lipid Visit (Submitted on 6/27/2024)  Chief Complaint: Chronic problems general questions HPI Form  Are you regularly taking any medication or supplement to lower your cholesterol?: Yes  Are you having muscle aches or other side effects that you think could be caused by your cholesterol lowering medication?: No  Hypertension Visit (Submitted on 6/27/2024)  Chief Complaint: Chronic problems general questions HPI Form  Do you check your blood pressure regularly outside of the clinic?: Yes  Are your blood pressures ever more than 140 on the top number (systolic) OR more than 90 on the bottom number (diastolic)? (For example, greater than 140/90): No  Are you following a low salt diet?: No  General Questionnaire (Submitted on 6/27/2024)  Chief Complaint: Chronic problems general questions HPI Form  How many servings of fruits  and vegetables do you eat daily?: 4 or more  On average, how many sweetened beverages do you drink each day (Examples: soda, juice, sweet tea, etc.  Do NOT count diet or artificially sweetened beverages)?: 0  How many minutes a day do you exercise enough to make your heart beat faster?: 30 to 60  How many days a week do you exercise enough to make your heart beat faster?: 5  How many days per week do you miss taking your medication?: 0

## 2024-07-18 ENCOUNTER — HOSPITAL ENCOUNTER (OUTPATIENT)
Dept: CARDIOLOGY | Facility: CLINIC | Age: 54
Discharge: HOME | End: 2024-07-18
Attending: FAMILY MEDICINE
Payer: COMMERCIAL

## 2024-07-18 ENCOUNTER — HOSPITAL ENCOUNTER (OUTPATIENT)
Dept: ULTRASOUND IMAGING | Facility: CLINIC | Age: 54
Discharge: HOME | End: 2024-07-18
Attending: FAMILY MEDICINE
Payer: COMMERCIAL

## 2024-07-18 DIAGNOSIS — Z82.49 FAMILY HISTORY OF CAROTID ENDARTERECTOMY: ICD-10-CM

## 2024-07-18 DIAGNOSIS — E78.5 HYPERLIPIDEMIA LDL GOAL <130: ICD-10-CM

## 2024-07-18 DIAGNOSIS — I10 ESSENTIAL HYPERTENSION WITH GOAL BLOOD PRESSURE LESS THAN 140/90: ICD-10-CM

## 2024-07-18 DIAGNOSIS — Z82.49 FAMILY HISTORY OF CAROTID ARTERY STENOSIS: ICD-10-CM

## 2024-07-18 DIAGNOSIS — R07.89 ATYPICAL CHEST PAIN: ICD-10-CM

## 2024-07-18 PROCEDURE — 93880 EXTRACRANIAL BILAT STUDY: CPT

## 2024-07-18 PROCEDURE — 93325 DOPPLER ECHO COLOR FLOW MAPG: CPT | Mod: TC

## 2024-07-18 PROCEDURE — 93350 STRESS TTE ONLY: CPT | Mod: 26 | Performed by: INTERNAL MEDICINE

## 2024-07-18 PROCEDURE — 93016 CV STRESS TEST SUPVJ ONLY: CPT | Performed by: INTERNAL MEDICINE

## 2024-07-18 PROCEDURE — 93018 CV STRESS TEST I&R ONLY: CPT | Performed by: INTERNAL MEDICINE

## 2024-07-18 PROCEDURE — 93321 DOPPLER ECHO F-UP/LMTD STD: CPT | Mod: 26 | Performed by: INTERNAL MEDICINE

## 2024-07-18 PROCEDURE — 93325 DOPPLER ECHO COLOR FLOW MAPG: CPT | Mod: 26 | Performed by: INTERNAL MEDICINE

## 2024-07-25 ENCOUNTER — HOSPITAL ENCOUNTER (OUTPATIENT)
Dept: MAMMOGRAPHY | Facility: CLINIC | Age: 54
Discharge: HOME | End: 2024-07-25
Attending: FAMILY MEDICINE | Admitting: FAMILY MEDICINE
Payer: COMMERCIAL

## 2024-07-25 DIAGNOSIS — Z12.31 VISIT FOR SCREENING MAMMOGRAM: ICD-10-CM

## 2024-07-25 PROCEDURE — 77063 BREAST TOMOSYNTHESIS BI: CPT

## 2024-08-14 ENCOUNTER — PATIENT OUTREACH (OUTPATIENT)
Dept: CARE COORDINATION | Facility: CLINIC | Age: 54
End: 2024-08-14
Payer: COMMERCIAL

## 2024-09-12 ENCOUNTER — LAB (OUTPATIENT)
Dept: LAB | Facility: CLINIC | Age: 54
End: 2024-09-12
Payer: COMMERCIAL

## 2024-09-12 DIAGNOSIS — R73.09 ELEVATED GLUCOSE LEVEL: ICD-10-CM

## 2024-09-12 DIAGNOSIS — E78.00 PURE HYPERCHOLESTEROLEMIA: ICD-10-CM

## 2024-09-12 DIAGNOSIS — I25.10 CORONARY ARTERY CALCIFICATION SEEN ON CT SCAN: ICD-10-CM

## 2024-09-12 DIAGNOSIS — E78.00 ELEVATED LDL CHOLESTEROL LEVEL: ICD-10-CM

## 2024-09-12 LAB
ALBUMIN SERPL BCG-MCNC: 4.6 G/DL (ref 3.5–5.2)
ALP SERPL-CCNC: 57 U/L (ref 40–150)
ALT SERPL W P-5'-P-CCNC: 18 U/L (ref 0–50)
APO A-I SERPL-MCNC: <6 MG/DL
AST SERPL W P-5'-P-CCNC: 29 U/L (ref 0–45)
BILIRUB DIRECT SERPL-MCNC: <0.2 MG/DL (ref 0–0.3)
BILIRUB SERPL-MCNC: 0.4 MG/DL
CHOLEST SERPL-MCNC: 126 MG/DL
CK SERPL-CCNC: 64 U/L (ref 26–192)
FASTING STATUS PATIENT QL REPORTED: YES
FASTING STATUS PATIENT QL REPORTED: YES
GLUCOSE SERPL-MCNC: 91 MG/DL (ref 70–99)
HDLC SERPL-MCNC: 59 MG/DL
LDLC SERPL CALC-MCNC: 60 MG/DL
NONHDLC SERPL-MCNC: 67 MG/DL
PROT SERPL-MCNC: 7.5 G/DL (ref 6.4–8.3)
TRIGL SERPL-MCNC: 36 MG/DL

## 2024-09-12 PROCEDURE — 82550 ASSAY OF CK (CPK): CPT

## 2024-09-12 PROCEDURE — 80076 HEPATIC FUNCTION PANEL: CPT

## 2024-09-12 PROCEDURE — 82947 ASSAY GLUCOSE BLOOD QUANT: CPT

## 2024-09-12 PROCEDURE — 83695 ASSAY OF LIPOPROTEIN(A): CPT

## 2024-09-12 PROCEDURE — 80061 LIPID PANEL: CPT

## 2024-09-12 PROCEDURE — 36415 COLL VENOUS BLD VENIPUNCTURE: CPT

## 2024-11-03 DIAGNOSIS — Z78.0 MENOPAUSE: Primary | ICD-10-CM

## 2024-11-04 RX ORDER — ESTRADIOL 0.5 MG/1
0.5 TABLET ORAL DAILY
Qty: 90 TABLET | Refills: 0 | Status: SHIPPED | OUTPATIENT
Start: 2024-11-04

## 2024-11-04 NOTE — TELEPHONE ENCOUNTER
Requested Prescriptions   Pending Prescriptions Disp Refills    estradiol (ESTRACE) 0.5 MG tablet [Pharmacy Med Name: ESTRADIOL 0.5MG TABS] 90 tablet 3     Sig: TAKE ONE TABLET BY MOUTH ONCE DAILY       Contraceptives Protocol Failed - 11/4/2024  9:43 AM        Failed - Medication indicated for associated diagnosis     Medication is associated with one or more of the following diagnoses:  Contraception  Acne  Dysmenorrhea  Menorrhagia  Amenorrhea  PCOS  Premenstrual Dysphoric Disorder  Irregular menses  Endometriosis  Contraceptive counseling  Finding of menstrual bleeding  Education about oral contraception  Uses contraception  Initial prescription of oral contraception  Oral contraception-no problem  Oral contraceptive repeat          Passed - Patient is not a current smoker if age is 35 or older        Passed - Medication is active on med list        Passed - Recent (12 mo) or future (90 days) visit within the authorizing provider's specialty     The patient must have completed an in-person or virtual visit within the past 12 months or has a future visit scheduled within the next 90 days with the authorizing provider s specialty.  Urgent care and e-visits do not quality as an office visit for this protocol.          Passed - No active pregnancy on record        Passed - No positive pregnancy test in past 12 months       Hormone Replacement Therapy Failed - 11/4/2024  9:43 AM        Failed - Medication indicated for associated diagnosis     The medication is prescribed for one or more of the following conditions:    Menopause   Vulva/Vaginal atrophy   Low Estrogen   Gender Dysphoria   Male to Female transgender          Passed - Most recent blood pressure under 140/90 in past 12 months     BP Readings from Last 3 Encounters:   05/10/24 122/76   02/22/24 112/66   11/07/23 108/78       No data recorded            Passed - Medication is active on med list        Passed - Recent (12 mo) or future (90 days) visit  within the authorizing provider's specialty     The patient must have completed an in-person or virtual visit within the past 12 months or has a future visit scheduled within the next 90 days with the authorizing provider s specialty.  Urgent care and e-visits do not quality as an office visit for this protocol.          Passed - Patient is 18 years of age or older        Passed - No active pregnancy on record        Passed - No positive pregnancy test on record in past 12 months       Hormone Replacement Therapy (vaginal) Failed - 11/4/2024  9:43 AM        Failed - Medication indicated for associated diagnosis     Medication is associated with one or more of the following diagnoses:     Menopause   Vulva/Vaginal atrophy   UTI prophylaxis          Passed - Medication is active on med list        Passed - Recent (12 mo) or future (90 days) visit within the authorizing provider's specialty     The patient must have completed an in-person or virtual visit within the past 12 months or has a future visit scheduled within the next 90 days with the authorizing provider s specialty.  Urgent care and e-visits do not quality as an office visit for this protocol.          Passed - Patient is 18 years of age or older        Passed - No active pregnancy on record        Passed - No positive pregnancy test on record in past 12 months           Courtesy fill given. UXArmyt sent

## 2024-11-18 DIAGNOSIS — N39.0 RECURRENT POSTCOITAL URINARY TRACT INFECTION: Primary | ICD-10-CM

## 2024-11-18 RX ORDER — SULFAMETHOXAZOLE AND TRIMETHOPRIM 800; 160 MG/1; MG/1
TABLET ORAL
COMMUNITY
End: 2024-11-20

## 2024-11-18 RX ORDER — SULFAMETHOXAZOLE AND TRIMETHOPRIM 800; 160 MG/1; MG/1
TABLET ORAL
Qty: 30 TABLET | Refills: 1 | OUTPATIENT
Start: 2024-11-18

## 2024-11-20 RX ORDER — SULFAMETHOXAZOLE AND TRIMETHOPRIM 800; 160 MG/1; MG/1
TABLET ORAL
Qty: 30 TABLET | Refills: 1 | Status: SHIPPED | OUTPATIENT
Start: 2024-11-20

## 2024-12-10 ENCOUNTER — APPOINTMENT (OUTPATIENT)
Age: 54
Setting detail: DERMATOLOGY
End: 2024-12-10

## 2024-12-10 DIAGNOSIS — Z41.9 ENCOUNTER FOR PROCEDURE FOR PURPOSES OTHER THAN REMEDYING HEALTH STATE, UNSPECIFIED: ICD-10-CM

## 2024-12-10 PROCEDURE — ? BOTOX

## 2024-12-10 NOTE — PROCEDURE: BOTOX
Total Units: 34
Detail Level: Detailed
Map Statement: Please see attached map for locations and injection amounts.
Consent: Written consent obtained. Risks include but not limited to lid/brow ptosis, bruising, swelling, diplopia, temporary effect, incomplete chemical denervation.
Show Inventory Tab: Show
Post-Care Instructions: Patient instructed to not lie down for 4 hours and limit physical activity for 24 hours.

## 2024-12-25 DIAGNOSIS — Z78.0 MENOPAUSE: ICD-10-CM

## 2024-12-26 NOTE — TELEPHONE ENCOUNTER
Requested Prescriptions   Pending Prescriptions Disp Refills    progesterone (PROMETRIUM) 200 MG capsule [Pharmacy Med Name: PROGESTERONE 200MG CAPS] 42 capsule 2     Sig: TAKE ONE CAPSULE (200MG) BY MOUTH ONCE DAILY ON DAYS 1 - 14 OF CYCLE       Hormone Replacement Therapy Failed - 12/26/2024 10:01 AM        Failed - Recent (12 mo) or future (90 days) visit within the authorizing provider's specialty     The patient must have completed an in-person or virtual visit within the past 12 months or has a future visit scheduled wt qualify as an office visit for this protocol.          Failed - Medication indicated for associated diagnosis     The medication is prescribed for one or more of the following conditions:    Menopause   Vulva/Vaginal atrophy   Low Estrogen   Gender Dysphoria   Male to Female transgender          Passed - Most recent blood pressure under 140/90 in past 12 months     BP Readings from Last 3 Encounters:   05/10/24 122/76   02/22/24 112/66   11/07/23 108/78       No data recorded            Passed - Medication is active on med list        Passed - Patient is 18 years of age or older        Passed - No active pregnancy on record        Passed - No positive pregnancy test on record in past 12 months           Needs appt- [facility] message sent    BO Chinchilla

## 2024-12-28 PROBLEM — R07.9 CHEST PAIN, UNSPECIFIED: Status: RESOLVED | Noted: 2017-02-24 | Resolved: 2024-12-28

## 2024-12-28 RX ORDER — PROGESTERONE 200 MG/1
CAPSULE ORAL
Qty: 42 CAPSULE | Refills: 2 | Status: SHIPPED | OUTPATIENT
Start: 2024-12-28

## 2024-12-30 DIAGNOSIS — I10 ESSENTIAL HYPERTENSION WITH GOAL BLOOD PRESSURE LESS THAN 140/90: ICD-10-CM

## 2024-12-30 RX ORDER — CANDESARTAN 8 MG/1
8 TABLET ORAL DAILY
Qty: 90 TABLET | Refills: 0 | Status: SHIPPED | OUTPATIENT
Start: 2024-12-30

## 2025-01-07 DIAGNOSIS — N39.0 RECURRENT POSTCOITAL URINARY TRACT INFECTION: ICD-10-CM

## 2025-01-07 RX ORDER — SULFAMETHOXAZOLE AND TRIMETHOPRIM 800; 160 MG/1; MG/1
TABLET ORAL
Qty: 30 TABLET | Refills: 1 | Status: SHIPPED | OUTPATIENT
Start: 2025-01-07

## 2025-02-02 DIAGNOSIS — Z78.0 MENOPAUSE: ICD-10-CM

## 2025-02-03 NOTE — TELEPHONE ENCOUNTER
Conjunctivae and eyelids appear normal,  Sclerae : White without injection  Last OV 11/2023.   No future appts on file.   Pt has already been given a courtesy refill.    My chart message sent to her.    [facility]

## 2025-02-10 RX ORDER — ESTRADIOL 0.5 MG/1
0.5 TABLET ORAL DAILY
Qty: 90 TABLET | Refills: 0 | OUTPATIENT
Start: 2025-02-10

## 2025-02-18 DIAGNOSIS — Z78.0 MENOPAUSE: ICD-10-CM

## 2025-02-19 RX ORDER — ESTRADIOL 0.5 MG/1
0.5 TABLET ORAL DAILY
Qty: 90 TABLET | Refills: 0 | Status: SHIPPED | OUTPATIENT
Start: 2025-02-19

## 2025-03-20 DIAGNOSIS — I10 ESSENTIAL HYPERTENSION WITH GOAL BLOOD PRESSURE LESS THAN 140/90: ICD-10-CM

## 2025-03-22 RX ORDER — CANDESARTAN 8 MG/1
8 TABLET ORAL DAILY
Qty: 90 TABLET | Refills: 0 | Status: SHIPPED | OUTPATIENT
Start: 2025-03-22

## 2025-03-31 ENCOUNTER — TELEPHONE (OUTPATIENT)
Dept: FAMILY MEDICINE | Facility: CLINIC | Age: 55
End: 2025-03-31
Payer: COMMERCIAL

## 2025-03-31 NOTE — TELEPHONE ENCOUNTER
General Call    Contacts       Contact Date/Time Type Contact Phone/Fax    03/31/2025 11:44 AM CDT Phone (Outgoing) Evita Carson (Self) [phone] (M)    Left Message           Reason for Call:  Appointment clarification     What are your questions or concerns:  Evita is scheduled for an OV on Thursday, but it says in the notes that she also wants a px, but her last one of those was 5/10/24, so if she wants the px, she should check with her insurance first. Then, we'd also have to change the appointment type to a px    Date of last appointment with provider: 6/27/24    Could we send this information to you in [place] or would you prefer to receive a phone call?:   No preference   Okay to leave a detailed message?: No at Home number on file [phone] ([place])

## 2025-04-03 ENCOUNTER — OFFICE VISIT (OUTPATIENT)
Dept: FAMILY MEDICINE | Facility: CLINIC | Age: 55
End: 2025-04-03
Payer: COMMERCIAL

## 2025-04-03 VITALS
TEMPERATURE: 96.3 F | DIASTOLIC BLOOD PRESSURE: 70 MMHG | HEIGHT: 64 IN | WEIGHT: 134.2 LBS | BODY MASS INDEX: 22.91 KG/M2 | RESPIRATION RATE: 14 BRPM | SYSTOLIC BLOOD PRESSURE: 110 MMHG | OXYGEN SATURATION: 100 % | HEART RATE: 68 BPM

## 2025-04-03 DIAGNOSIS — Z80.41 FAMILY HISTORY OF OVARIAN CANCER: ICD-10-CM

## 2025-04-03 DIAGNOSIS — Z00.00 ROUTINE GENERAL MEDICAL EXAMINATION AT A HEALTH CARE FACILITY: Primary | ICD-10-CM

## 2025-04-03 DIAGNOSIS — I10 ESSENTIAL HYPERTENSION WITH GOAL BLOOD PRESSURE LESS THAN 140/90: ICD-10-CM

## 2025-04-03 DIAGNOSIS — E58 CALCIUM DEFICIENCY: ICD-10-CM

## 2025-04-03 DIAGNOSIS — Z01.419 ENCOUNTER FOR GYNECOLOGICAL EXAMINATION WITHOUT ABNORMAL FINDING: ICD-10-CM

## 2025-04-03 DIAGNOSIS — N39.0 RECURRENT POSTCOITAL URINARY TRACT INFECTION: ICD-10-CM

## 2025-04-03 DIAGNOSIS — E78.5 HYPERLIPIDEMIA LDL GOAL <70: ICD-10-CM

## 2025-04-03 DIAGNOSIS — Z81.8 FAMILY HISTORY OF DEMENTIA: ICD-10-CM

## 2025-04-03 DIAGNOSIS — Z12.31 VISIT FOR SCREENING MAMMOGRAM: ICD-10-CM

## 2025-04-03 DIAGNOSIS — I25.10 CORONARY ARTERY CALCIFICATION SEEN ON CT SCAN: ICD-10-CM

## 2025-04-03 LAB
ALBUMIN SERPL BCG-MCNC: 4.4 G/DL (ref 3.5–5.2)
ALP SERPL-CCNC: 54 U/L (ref 40–150)
ALT SERPL W P-5'-P-CCNC: 24 U/L (ref 0–50)
ANION GAP SERPL CALCULATED.3IONS-SCNC: 10 MMOL/L (ref 7–15)
AST SERPL W P-5'-P-CCNC: 27 U/L (ref 0–45)
BILIRUB SERPL-MCNC: 0.5 MG/DL
BUN SERPL-MCNC: 15.3 MG/DL (ref 6–20)
CALCIUM SERPL-MCNC: 9.3 MG/DL (ref 8.8–10.4)
CHLORIDE SERPL-SCNC: 105 MMOL/L (ref 98–107)
CHOLEST SERPL-MCNC: 144 MG/DL
CREAT SERPL-MCNC: 0.87 MG/DL (ref 0.51–0.95)
CREAT UR-MCNC: 30.1 MG/DL
EGFRCR SERPLBLD CKD-EPI 2021: 77 ML/MIN/1.73M2
ERYTHROCYTE [DISTWIDTH] IN BLOOD BY AUTOMATED COUNT: 12.2 % (ref 10–15)
FASTING STATUS PATIENT QL REPORTED: YES
FASTING STATUS PATIENT QL REPORTED: YES
GLUCOSE SERPL-MCNC: 94 MG/DL (ref 70–99)
HCO3 SERPL-SCNC: 26 MMOL/L (ref 22–29)
HCT VFR BLD AUTO: 40.6 % (ref 35–47)
HDLC SERPL-MCNC: 59 MG/DL
HGB BLD-MCNC: 13.8 G/DL (ref 11.7–15.7)
LDLC SERPL CALC-MCNC: 75 MG/DL
MCH RBC QN AUTO: 31.4 PG (ref 26.5–33)
MCHC RBC AUTO-ENTMCNC: 34 G/DL (ref 31.5–36.5)
MCV RBC AUTO: 93 FL (ref 78–100)
MICROALBUMIN UR-MCNC: <12 MG/L
MICROALBUMIN/CREAT UR: NORMAL MG/G{CREAT}
NONHDLC SERPL-MCNC: 85 MG/DL
PLATELET # BLD AUTO: 210 10E3/UL (ref 150–450)
POTASSIUM SERPL-SCNC: 4.1 MMOL/L (ref 3.4–5.3)
PROT SERPL-MCNC: 7.1 G/DL (ref 6.4–8.3)
RBC # BLD AUTO: 4.39 10E6/UL (ref 3.8–5.2)
SODIUM SERPL-SCNC: 141 MMOL/L (ref 135–145)
TRIGL SERPL-MCNC: 48 MG/DL
WBC # BLD AUTO: 6.3 10E3/UL (ref 4–11)

## 2025-04-03 RX ORDER — ROSUVASTATIN CALCIUM 10 MG/1
10 TABLET, COATED ORAL
Qty: 90 TABLET | Refills: 3 | Status: SHIPPED | OUTPATIENT
Start: 2025-04-03

## 2025-04-03 RX ORDER — CANDESARTAN 8 MG/1
8 TABLET ORAL DAILY
Qty: 90 TABLET | Refills: 3 | Status: SHIPPED | OUTPATIENT
Start: 2025-04-03

## 2025-04-03 SDOH — HEALTH STABILITY: PHYSICAL HEALTH: ON AVERAGE, HOW MANY DAYS PER WEEK DO YOU ENGAGE IN MODERATE TO STRENUOUS EXERCISE (LIKE A BRISK WALK)?: 5 DAYS

## 2025-04-03 ASSESSMENT — SOCIAL DETERMINANTS OF HEALTH (SDOH): HOW OFTEN DO YOU GET TOGETHER WITH FRIENDS OR RELATIVES?: MORE THAN THREE TIMES A WEEK

## 2025-04-03 NOTE — PROGRESS NOTES
Preventive Care Visit  [facility] [place]  Patti Purcell MD, Family Medicine  Apr 3, 2025      Assessment & Plan       ICD-10-CM    1. Routine general medical examination at a health care facility  Z00.00       2. Essential hypertension with goal blood pressure less than 140/90- well controlled today , and discussed  labs and med refills  I10 Albumin Random Urine Quantitative with Creat Ratio     candesartan (ATACAND) 8 MG tablet     Albumin Random Urine Quantitative with Creat Ratio      3. Hyperlipidemia LDL goal <70  E78.5 Lipid panel reflex to direct LDL Fasting     Comprehensive metabolic panel     rosuvastatin (CRESTOR) 10 MG tablet     Lipid panel reflex to direct LDL Fasting     Comprehensive metabolic panel      4. Coronary artery calcification seen on CT scan  I25.10 rosuvastatin (CRESTOR) 10 MG tablet      5. Visit for screening mammogram  Z12.31 MA Screening Bilateral w/ Mina      6. Family history of dementia- dad with mild cognitive impairment - dx'd at age 83 - sx's started age 77  Z81.8       7. Encounter for gynecological examination without abnormal finding- sees Dr. Sujey Hennessy [facility] ob/gyn- seeing her upcoming for breast exam and discussion of her HRT  Z01.419       8. Calcium deficiency- on calcium supplementation - normal levels on labs - hx of kidney stones  E58       9. Recurrent postcoital urinary tract infection- has plenty of her Bactrim DS at home right now - will call if she needs more  N39.0         Assessment & Plan  - Essential Hypertension: Blood pressure is well-controlled with candesartan 8 mg once daily. Refill prescription at [facility]. Continue monitoring blood pressure.    - Pure Hypercholesterolemia: Lipoprotein A level is favorable. Continue rosuvastatin 10 mg. Refill prescription at [facility]. Routine cholesterol and lipid panel testing ordered. Statin therapy is ongoing due to family history, despite not having high cholesterol. Monitor for  "chest pain or upper back pressure.    - Coronary Artery Calcification: Continue statin therapy. Monitor for symptoms like chest pain or upper back pressure.    - Visit for Screening Mammogram: Mammogram scheduled for July 2025. Proceed with scheduled mammogram.    - Vaccination: Evita plans to get the shingles vaccine when convenient. Discussed potential side effects and benefits of vaccination.    - Diet and Exercise: Continue current diet and exercise routine, including green smoothies and resistance training.    Patient has been advised of split billing requirements and indicates understanding: Yes        Counseling  Appropriate preventive services were addressed with this patient via screening, questionnaire, or discussion as appropriate for fall prevention, nutrition, physical activity, Tobacco-use cessation, social engagement, weight loss and cognition.  Checklist reviewing preventive services available has been given to the patient.  Reviewed patient's diet, addressing concerns and/or questions.       MEDICATIONS:   Orders Placed This Encounter   Medications    candesartan (ATACAND) 8 MG tablet     Sig: Take 1 tablet (8 mg) by mouth daily.     Dispense:  90 tablet     Refill:  3     ### Profile Rx: patient will contact pharmacy when needed ###    rosuvastatin (CRESTOR) 10 MG tablet     Sig: Take 1 tablet (10 mg) by mouth daily at 2 pm.     Dispense:  90 tablet     Refill:  3     ### Profile Rx: patient will contact pharmacy when needed ###          - Continue other medications without change  Regular exercise  See Patient Instructions  Return in about 1 year (around 4/3/2026) for cholesterol, blood pressure, w/ Dr. BERNSTEIN for 40 min appt.     The longitudinal plan of care for the diagnosis(es)/condition(s) as documented were addressed during this visit. Due to the added complexity in care, I will continue to support Evita in the subsequent management and with ongoing continuity of care.    \"Consent was obtained from " "the patient to use an AI scribe/documentation tool in the creation of this note.This note/dictation was performed and completed with the assistance of voice recognition software and an AI scribe. It may contain inadvertant transcription  errors,  omissions and/or  inadvertent word substitution.\" --Patti Purcell MD           Garret Jama is a 55 year old, presenting for the following:  Physical  and the following other medical problems:      1. Routine general medical examination at a health care facility    2. Essential hypertension with goal blood pressure less than 140/90- well controlled today , and discussed  labs and med refills    3. Hyperlipidemia LDL goal <70    4. Coronary artery calcification seen on CT scan    5. Visit for screening mammogram    6. Family history of dementia- dad with mild cognitive impairment - dx'd at age 83 - sx's started age 77    7. Encounter for gynecological examination without abnormal finding- sees Dr. Sujey Hennessy [facility] ob/gyn- seeing her upcoming for breast exam and discussion of her HRT    8. Calcium deficiency- on calcium supplementation - normal levels on labs - hx of kidney stones    9. Recurrent postcoital urinary tract infection- has plenty of her Bactrim DS at home right now - will call if she needs more    10. Family history of ovarian cancer - following with Dr. Hennessy now - [facility] ob/gyn  - ? BSO in late 40's -mother negative on genetic testing            4/3/2025     7:40 AM   Additional Questions   Roomed by Sherly POOL   Accompanied by Self          History of Present Illness       Hypertension: She presents for follow up of hypertension.  She does not check blood pressure  regularly outside of the clinic. Outpatient blood pressures have not been over 140/90. She does not follow a low salt diet.     She eats 4 or more servings of fruits and vegetables daily.She consumes 0 sweetened beverage(s) daily.She exercises with enough effort to increase her heart rate " 30 to 60 minutes per day.  She exercises with enough effort to increase her heart rate 5 days per week.   She is taking medications regularly.       History of Present Illness-  Grandchild's Illness: Evita's grandchild, Sujey's child, was hospitalized three times since October due to rhinovirus, enterovirus, and RSV. The child went into hypovolemic shock and was transferred to the PICU. Immunoglobulin levels were low, and the child is now seeing an immunologist.    Skiing and Exercise: Evita went skiing in [place] last weekend for the first time since 2020. She was initially scared of injury but compared it to riding a bike. She does resistance training, Pilates, and yoga, and sometimes uses the elliptical.    Cholesterol Management: Evita saw a specialist , Dr. Heck, cardiology, about her cholesterol and calcium scores. Her lipoprotein A level is less than six. She takes rosuvastatin 10mg daily , but the specialist said the statin's effectiveness for her family history is uncertain. She should watch for chest pain or upper back pressure.    Hypertension: Evita has hypertension and takes candesartan.well controlled. No side effects.  Her mother takes a low dose of amlodipine, which lowers her blood pressure significantly. Pt declines TSH testing today as part of HTN monitoring.     Family History of Cognitive Impairment: Evita's father was diagnosed with mild cognitive impairment at 83, though symptoms started at 77. His tests didn't clearly show Alzheimer's or vascular dementia.     Diet and Nutrition: Evita drinks green smoothies with kale, broccoli, and spinach, blended with water and frozen into cubes. She adds frozen fruit and water to make a smoothie, aiming for at least three servings of greens daily.     Vaccination History: Evita hasn't received her shingles vaccine yet. She had scheduling issues at a pharmacy but plans to get vaccinated when convenient. Declined prevnar 20 vaccine today.  Will get that at  pharmacy as well.     Skin and Hair Concerns: Evita gets yearly skin checks at her Dermatology - Dr. Johnston's office  and is thinking about taking oral minoxidil for hair growth, as her hair has stopped growing beyond a certain length. She has been using topical minoxidil for some time.      Hypertension Follow-up:     Do you check your blood pressure regularly outside of the clinic? No   Are you following a low salt diet? No  Are your blood pressures ever more than 140 on the top number (systolic) OR more   than 90 on the bottom number (diastolic), for example 140/90? No    Advance Care Planning  Patient does not have a Health Care Directive: Discussed advance care planning with patient; however, patient declined at this time.      4/3/2025   General Health   How would you rate your overall physical health? Excellent   Feel stress (tense, anxious, or unable to sleep) Not at all         4/3/2025   Nutrition   Three or more servings of calcium each day? Yes   Diet: Regular (no restrictions)   How many servings of fruit and vegetables per day? 4 or more   How many sweetened beverages each day? 0-1         4/3/2025   Exercise   Days per week of moderate/strenous exercise 5 days         4/3/2025   Social Factors   Frequency of gathering with friends or relatives More than three times a week   Worry food won't last until get money to buy more No   Food not last or not have enough money for food? No   Do you have housing? (Housing is defined as stable permanent housing and does not include staying ouside in a car, in a tent, in an abandoned building, in an overnight shelter, or couch-surfing.) Yes   Are you worried about losing your housing? No   Lack of transportation? No   Unable to get utilities (heat,electricity)? No         4/3/2025   Fall Risk   Fallen 2 or more times in the past year? No   Trouble with walking or balance? No          4/3/2025   Dental   Dentist two times every year? Yes           Today's PHQ-2 Score:        4/3/2025     7:39 AM   PHQ-2 ( 1999 Pf[employer])   Q1: Little interest or pleasure in doing things 0   Q2: Feeling down, depressed or hopeless 0   PHQ-2 Score 0    Q1: Little interest or pleasure in doing things Not at all   Q2: Feeling down, depressed or hopeless Not at all   PHQ-2 Score 0       Patient-reported           4/3/2025   Substance Use   Alcohol more than 3/day or more than 7/wk No   Do you use any other substances recreationally? No     Social History     Tobacco Use    Smoking status: Never    Smokeless tobacco: Never   Vaping Use    Vaping status: Never Used   Substance Use Topics    Alcohol use: Yes     Comment: 0-1 a week    Drug use: No         7/25/2024   LAST FHS-7 RESULTS   1st degree relative breast or ovarian cancer No   Any relative bilateral breast cancer No   Any male have breast cancer No   Any ONE woman have BOTH breast AND ovarian cancer No   Any woman with breast cancer before 50yrs No   2 or more relatives with breast AND/OR ovarian cancer No   2 or more relatives with breast AND/OR bowel cancer No   Mammogram Screening - Mammogram every 1-2 years updated in Health Maintenance based on mutual decision making        4/3/2025   STI Screening   New sexual partner(s) since last STI/HIV test? No     History of abnormal Pap smear: No - age 30- 64 PAP with HPV every 5 years recommended        Latest Ref Rng & Units 11/7/2023     1:42 PM 1/11/2019    11:22 AM 1/11/2019    11:00 AM   PAP / HPV   PAP  Negative for Intraepithelial Lesion or Malignancy (NILM)      PAP (Historical)   NIL     HPV 16 DNA Negative Negative   Negative    HPV 18 DNA Negative Negative   Negative    Other HR HPV Negative Negative   Negative      ASCVD Risk   The ASCVD Risk score (Inés FRANK, et al., 2019) failed to calculate for the following reasons:    The valid total cholesterol range is 130 to 320 mg/dL    Pt declined referral for DEXA scan at this time. Considering doing it at age 63 per current guidelines.      Reviewed and updated as needed this visit by Provider                    Past Medical History:   Diagnosis Date    Calculus of kidney 1996    Calcium stone - etiology after w/u was excess calcium intake    Fam hx-cardiovas dis NEC     father with MI at age 58 yo, s/p PTCA    Family history of ovarian cancer - following with Dr. Stuart - ? BSO in late       Fertility testing -    used lifetime total of 11 months of Clomid and 2 months of gonadotropins    Hypertension 2017    Volvulus (H) child    Resolved without surgery - hospitalized for 1 day     Past Surgical History:   Procedure Laterality Date    ABDOMINOPLASTY N/A 2019    Procedure: MINI ABDOMINOPLASTY;  Surgeon: Carolina Kitchen MD;  Location: [facility];  Service: Plastics     SECTION      COLONOSCOPY      Dr. Castro [facility]    COLONOSCOPY N/A 10/12/2018    Procedure: COLONOSCOPY;  COLONOSCOPY [fv]  ;  Surgeon: Alexander Castro MD;  Location:  GI    ESOPHAGOSCOPY, GASTROSCOPY, DUODENOSCOPY (EGD), COMBINED N/A 2019    Procedure: ESOPHAGOGASTRODUODENOSCOPY, WITH BIOPSY;  Surgeon: Alexander Castro MD;  Location:  GI    LASIK Left     mini tummy tuck  2019    ZZC  DELIVERY ONLY  ,96,99,02    4 C/S     OB History    Para Term  AB Living   6 4 4 0 2 4   SAB IAB Ectopic Multiple Live Births   2 0 0 0 4      # Outcome Date GA Lbr Berry/2nd Weight Sex Type Anes PTL Lv   6 Term 02 37w0d 02:00 4.082 kg (9 lb) F CS .SPINAL  ELSIE      Birth Comments: elective repeat C/S after SROM and spontaneous labor      Name: Demi      Apgar1: 8  Apgar5: 9   5 SAB 01 6w0d    SAB   DEC      Birth Comments: NO D&C   4 Term 99 39w0d  3.685 kg (8 lb 2 oz) M CS .EPIDURAL  ELSIE      Birth Comments: elective repeat C/S      Name: Srinivasa      Apgar1: 9  Apgar5: 10   3 Term 96 38w0d 36:00 4.139 kg (9 lb 2 oz) M CS .EPIDURAL  ELSIE      Birth Comments: failed  attempt      Name:  Allan      Apgar1: 9  Apgar5: 10   2 Term 94 39w0d  3.827 kg (8 lb 7 oz) F CS .EPIDURAL  ELSIE      Birth Comments: elective C/S for breech      Name: Sujey      Apgar1: 8  Apgar5: 9   1 SAB 93 14w0d    SAB   DEC      Birth Comments: NO D&C     Lab work is in process  Labs reviewed in EPIC  BP Readings from Last 3 Encounters:   25 110/70   05/10/24 122/76   24 112/66    Wt Readings from Last 3 Encounters:   25 60.9 kg (134 lb 3.2 oz)   05/10/24 60.6 kg (133 lb 8 oz)   24 61.6 kg (135 lb 14.4 oz)                  Patient Active Problem List   Diagnosis    Family history of ischemic heart disease - father's side of family - with normal lipids    Family history of ovarian cancer - following with Dr. Hennessy now - [facility] ob/gyn  - ? BSO in late 40s -mother negative on genetic testing    Essential hypertension with goal blood pressure less than 140/90    Irritable bowel syndrome with diarrhea    Anisometropia    Vitamin D deficiency    Menopause    Symptomatic menopausal or female climacteric states    Recurrent postcoital urinary tract infection    Encounter for gynecological examination without abnormal finding- sees Dr. Sujey Hennessy [facility] ob/gyn    Pure hypercholesterolemia    Calcium deficiency- on calcium supplementation - normal levels on labs - hx of kidney stones    Coronary artery calcification seen on CT scan    Family history of dementia- dad with mild cognitive impairment - dx'd at age 83 - sx's started age 75     Past Surgical History:   Procedure Laterality Date    ABDOMINOPLASTY N/A 2019    Procedure: MINI ABDOMINOPLASTY;  Surgeon: Carolina Kitchen MD;  Location: [facility];  Service: Plastics     SECTION      COLONOSCOPY      Dr. Castro [facility]    COLONOSCOPY N/A 10/12/2018    Procedure: COLONOSCOPY;  COLONOSCOPY [fv]  ;  Surgeon: Alexander Castro MD;  Location:  GI    ESOPHAGOSCOPY, GASTROSCOPY, DUODENOSCOPY (EGD), COMBINED N/A  2019    Procedure: ESOPHAGOGASTRODUODENOSCOPY, WITH BIOPSY;  Surgeon: Alexander Castro MD;  Location: [facility]    LASIK Left     mini tummy tuck  2019    ZC  DELIVERY ONLY  94,96,99,02    4 C/S       Social History     Tobacco Use    Smoking status: Never    Smokeless tobacco: Never   Substance Use Topics    Alcohol use: Yes     Comment: 0-1 a week     Family History   Problem Relation Age of Onset    Hypertension Mother     Osteoporosis Mother         Osteopenia    Bladder Cancer Mother     Gallbladder Disease Mother         Gallbladder removed- with some dysplasia noted    C.A.D. Father         MI at age 59 and stented -     Hypertension Father     Gastrointestinal Disease Father         gallstones and gallstone pancreatitis at age 61    Lipids Father         low HDL    Genitourinary Problems Father         uric acid kidney stone    Dementia Father 83        possible early dementia- sx's since age 77    Cancer Maternal Grandmother          of ovarian cancer at age 50    Hypertension Maternal Grandfather     Alzheimer Disease Maternal Grandfather         in late 70's    Gastrointestinal Disease Paternal Grandmother          of gallstone pancreatitis at age 50    C.A.D. Paternal Grandfather         bypass in 70's    Depression Paternal Grandfather         in old age    Respiratory Paternal Grandfather         sleep apnea    Colon Cancer Maternal Uncle         3 maternal great-uncles with colon cancer     Aneurysm Paternal Uncle     Aneurysm Paternal Half-Sister     Coronary Artery Disease Early Onset Other         father's side of family - late 50's with normal LDLs and BP's          Current Outpatient Medications   Medication Sig Dispense Refill    aspirin (ASPIRIN LOW DOSE) 81 MG EC tablet Take 1 tablet (81 mg) by mouth daily 30 tablet 3    calcium citrate 250 MG TABS Take 2-3 tablets by mouth daily (Patient taking differently: Take 1-2 tablets by mouth daily.)      candesartan (ATACAND) 8  "MG tablet Take 1 tablet (8 mg) by mouth daily. 90 tablet 3    Coenzyme Q10 (COQ10) 50 MG CAPS Take 70 mg by mouth daily      estradiol (ESTRACE) 0.5 MG tablet TAKE ONE TABLET BY MOUTH ONCE DAILY 90 tablet 0    GLUCOSAMINE 500 MG OR CAPS Take by mouth daily  0    MULTI-VITAMIN OR TABS 1 TABLET DAILY 30 0    OMEGA-3 CAPS   OR Take by mouth daily EPA  0    PROBIOTICA OR CHEW take one tablet daily  0    progesterone (PROMETRIUM) 100 MG capsule 2 tablets daily on days 1-14 of cycle. 84 capsule 3    progesterone (PROMETRIUM) 200 MG capsule TAKE ONE CAPSULE (200MG) BY MOUTH ONCE DAILY ON DAYS 1 - 14 OF CYCLE 42 capsule 2    rosuvastatin (CRESTOR) 10 MG tablet Take 1 tablet (10 mg) by mouth daily at 2 pm. 90 tablet 3    sulfamethoxazole-trimethoprim (BACTRIM DS) 800-160 MG tablet TAKE ONE TABLET BY MOUTH AFTER INTERCOURSE AS NEEDED 30 tablet 1    Vitamin D-Vitamin K (VITAMIN K2-VITAMIN D3 PO) Take by mouth daily       Allergies   Allergen Reactions    No Known Drug Allergy      Recent Labs   Lab Test 09/12/24  0914 02/05/24  1030 06/20/22  0817 12/16/20  0906 10/31/19  1440   LDL 60 137* 93 119*  --    HDL 59 59 62 63  --    TRIG 36 61 86 77  --    ALT 18 14 18 31 25   CR  --  0.97* 0.85 0.85 0.93   GFRESTIMATED  --  69 81 79 71   GFRESTBLACK  --   --   --  >90 82   POTASSIUM  --  4.5 3.7 4.0 3.6   TSH  --  1.43 2.47 1.89  --       Results  - Lipoprotein A level: less than 6  - Fasting lipids, comprehensive metabolic panel, CBC, and microalbumin ordered        Review of Systems  Constitutional, HEENT, cardiovascular, pulmonary, GI, , musculoskeletal, neuro, skin, endocrine and psych systems are negative, except as otherwise noted.     Objective    Exam  /70   Pulse 68   Temp (!) 96.3  F (35.7  C) (Tympanic)   Resp 14   Ht 1.632 m (5' 4.25\")   Wt 60.9 kg (134 lb 3.2 oz)   LMP 08/19/2020   SpO2 100%   BMI 22.86 kg/m     Estimated body mass index is 22.86 kg/m  as calculated from the following:    Height as of " "this encounter: 1.632 m (5' 4.25\").    Weight as of this encounter: 60.9 kg (134 lb 3.2 oz).    Physical Exam  GENERAL: alert and no distress  EYES: Eyes grossly normal to inspection, PERRL and conjunctivae and sclerae normal  HENT: ear canals and TM's normal, nose and mouth without ulcers or lesions  NECK: no adenopathy, no asymmetry, masses, or scars  RESP: lungs clear to auscultation - no rales, rhonchi or wheezes  CV: regular rate and rhythm, normal S1 S2, no S3 or S4, no murmur, click or rub, no peripheral edema  ABDOMEN: soft, nontender, no hepatosplenomegaly, no masses and bowel sounds normal  MS: no gross musculoskeletal defects noted, no edema  SKIN: no suspicious lesions or rashes  NEURO: Normal strength and tone, mentation intact and speech normal  PSYCH: mentation appears normal, affect normal/bright    Pt declined breast exam today - will have done at ob/gyn in the next 1-2 months.   Mammogram ordered for July - due at that time after 7/25/2025.       Signed Electronically by: Patti Purcell MD  "

## 2025-04-03 NOTE — PATIENT INSTRUCTIONS
"[facility]  [address] [place] [id number]  Office: [phone]   Fax:    [phone]   Based on our discussion, I have outlined the following instructions for you:    - Take candesartan 8 mg once daily to keep your blood pressure under control.    - Refill your candesartan prescription at [place].    - Keep an eye on your blood pressure regularly.    - Continue taking rosuvastatin 10 mg as prescribed.    - Refill your rosuvastatin prescription at [place].    - Get routine cholesterol and lipid panel tests as ordered.    - Be aware of any chest pain or pressure in your upper back and report it if it occurs.    - Attend your scheduled mammogram in July 2025.    - Plan to get the shingles vaccine when it is convenient for you.    - Keep up with your current diet and exercise routine, including green smoothies and resistance training.    Thank you again for your visit, and we look forward to supporting you in your journey to better health.     Check with your insurance re: coverage for the shingles vaccine - Shingrix. - it is a 2 part shot. 2-6 months apart.  You can always be late on the second shot, just not early.  Ask insurance is the Shingrix better covered in pharmacy or clinic? It can be as much as $300 per shot out of pocket for you, if we don't get the location correct. You don't need an order to get them at any major  pharmacy.     You can do your vaccinations/immunizations and/or BP checks in any [facility] pharmacy:     To make a pharmacy only appointment online, please go to [email or web address] and select \"Click here to schedule a Vaccination or Blood Pressure Check at available [facility]acies \" at the bottom of the first page.  You can then select a location, then date and time bubbles that best fits your schedule.         To prevent and/or treat mild side effects (body aches, chills, fever, fatigue) -not related to allergies - after receiving COVID-19 " novel coronavirus vaccine or other vaccines :     Go into your shots very well hydrated and stay well hydrated for 3-4 days afterwards. The reactions to shots are both histamine mediated and inflammatory mediated, so we can mitigate those side effects with mild anti-inflammatories and non-sedating anti-histamines, such as claritin or allegra.     Don't take Ibuprofen prior to your shot, but rather just afterward.  No prescription steroids within 2 weeks of the shots as they can suppress your immune system, but Ibuprofen is not a significant immunosuppressant at the 400mg dosage.   Ok to take tylenol 2-325 or 2-500mg tabs prior to vaccine.    Take claritin (a non-sedating anti-histamine) 10 mg daily or allegra 180mg daily for 3 days and 2 over the counter 200mg  Ibuprofen pills every 4-6 hours with food while awake for the next 2-3 days .  Take them with food so they don't irritate your stomach.  You can do the above regimen for 2-3 days after your first,  second, or third  shots to decrease the possibility of achiness, fever, chills after your COVID-19 novel coronavirus or any current vaccines.  If you can't take Ibuprofen , you can substitute Tylenol 2-325mg tabs every 8 hours or 2-500mg tabs every 12 hours scheduled for 2-3 days after you get your shots instead.       Patient Education   Preventive Care Advice   This is general advice given by our system to help you stay healthy. However, your care team may have specific advice just for you. Please talk to your care team about your preventive care needs.  Nutrition  Eat 5 or more servings of fruits and vegetables each day.  Try wheat bread, brown rice and whole grain pasta (instead of white bread, rice, and pasta).  Get enough calcium and vitamin D. Check the label on foods and aim for 100% of the RDA (recommended daily allowance).  Lifestyle  Exercise at least 150 minutes each week  (30 minutes a day, 5 days a week).  Do muscle strengthening activities 2 days a  week. These help control your weight and prevent disease.  No smoking.  Wear sunscreen to prevent skin cancer.  Have a dental exam and cleaning every 6 months.  Yearly exams  See your health care team every year to talk about:  Any changes in your health.  Any medicines your care team has prescribed.  Preventive care, family planning, and ways to prevent chronic diseases.  Shots (vaccines)   HPV shots (up to age 26), if you've never had them before.  Hepatitis B shots (up to age 59), if you've never had them before.  COVID-19 shot: Get this shot when it's due.  Flu shot: Get a flu shot every year.  Tetanus shot: Get a tetanus shot every 10 years.  Pneumococcal, hepatitis A, and RSV shots: Ask your care team if you need these based on your risk.  Shingles shot (for age 50 and up)  General health tests  Diabetes screening:  Starting at age 35, Get screened for diabetes at least every 3 years.  If you are younger than age 35, ask your care team if you should be screened for diabetes.  Cholesterol test: At age 39, start having a cholesterol test every 5 years, or more often if advised.  Bone density scan (DEXA): At age 50, ask your care team if you should have this scan for osteoporosis (brittle bones).  Hepatitis C: Get tested at least once in your life.  STIs (sexually transmitted infections)  Before age 24: Ask your care team if you should be screened for STIs.  After age 24: Get screened for STIs if you're at risk. You are at risk for STIs (including HIV) if:  You are sexually active with more than one person.  You don't use condoms every time.  You or a partner was diagnosed with a sexually transmitted infection.  If you are at risk for HIV, ask about PrEP medicine to prevent HIV.  Get tested for HIV at least once in your life, whether you are at risk for HIV or not.  Cancer screening tests  Cervical cancer screening: If you have a cervix, begin getting regular cervical cancer screening tests starting at age  21.  Breast cancer scan (mammogram): If you've ever had breasts, begin having regular mammograms starting at age 40. This is a scan to check for breast cancer.  Colon cancer screening: It is important to start screening for colon cancer at age 45.  Have a colonoscopy test every 10 years (or more often if you're at risk) Or, ask your provider about stool tests like a FIT test every year or Cologuard test every 3 years.  To learn more about your testing options, visit:   .  For help making a decision, visit:   https://bit.ly/rp80819.  Prostate cancer screening test: If you have a prostate, ask your care team if a prostate cancer screening test (PSA) at age 55 is right for you.  Lung cancer screening: If you are a current or former smoker ages 50 to 80, ask your care team if ongoing lung cancer screenings are right for you.  For informational purposes only. Not to replace the advice of your health care provider. Copyright   2023 [facility]. All rights reserved. Clinically reviewed by the [facility]. hhgregg [id number] - REV 01/24.  Eating Healthy Foods: Care Instructions  With every meal, you can make healthy food choices. Try to eat a variety of fruits, vegetables, whole grains, lean proteins, and low-fat dairy products. This can help you get the right balance of nutrients, including vitamins and minerals. Small changes add up over time. You can start by adding one healthy food to your meals each day.    Try to make half your plate fruits and vegetables, one-fourth whole grains, and one-fourth lean proteins. Try including dairy with your meals.   Eat more fruits and vegetables. Try to have them with most meals and snacks.   Foods for healthy eating        Fruits   These can be fresh, frozen, canned, or dried.  Try to choose whole fruit rather than fruit juice.  Eat a variety of colors.        Vegetables   These can be fresh, frozen, canned, or dried.  Beans, peas, and lentils  "count too.        Whole grains   Choose whole-grain breads, cereals, and noodles.  Try brown rice.        Lean proteins   These can include lean meat, poultry, fish, and eggs.  You can also have tofu, beans, peas, lentils, nuts, and seeds.        Dairy   Try milk, yogurt, and cheese.  Choose low-fat or fat-free when you can.  If you need to, use lactose-free milk or fortified plant-based milk products, such as soy milk.        Water   Drink water when you're thirsty.  Limit sugar-sweetened drinks, including soda, fruit drinks, and sports drinks.  Where can you learn more?  Go to https://www.[facility].net/patiented  Enter T756 in the search box to learn more about \"Eating Healthy Foods: Care Instructions.\"  Current as of: October 7, 2024  Content Version: 14.4    5984-8744 [employer].   Care instructions adapted under license by your healthcare professional. If you have questions about a medical condition or this instruction, always ask your healthcare professional. [employer] disclaims any warranty or liability for your use of this information.       "

## 2025-04-08 ENCOUNTER — OFFICE VISIT (OUTPATIENT)
Dept: OPTOMETRY | Facility: CLINIC | Age: 55
End: 2025-04-08
Payer: COMMERCIAL

## 2025-04-08 DIAGNOSIS — H52.31 ANISOMETROPIA: ICD-10-CM

## 2025-04-08 DIAGNOSIS — Z98.890 S/P LASIK SURGERY: ICD-10-CM

## 2025-04-08 DIAGNOSIS — H52.4 PRESBYOPIA: ICD-10-CM

## 2025-04-08 DIAGNOSIS — H52.03 HYPEROPIA OF BOTH EYES: Primary | ICD-10-CM

## 2025-04-08 PROCEDURE — 92014 COMPRE OPH EXAM EST PT 1/>: CPT | Performed by: OPTOMETRIST

## 2025-04-08 PROCEDURE — 92015 DETERMINE REFRACTIVE STATE: CPT | Performed by: OPTOMETRIST

## 2025-04-08 ASSESSMENT — CONF VISUAL FIELD
OS_INFERIOR_NASAL_RESTRICTION: 0
METHOD: COUNTING FINGERS
OS_SUPERIOR_TEMPORAL_RESTRICTION: 0
OD_SUPERIOR_NASAL_RESTRICTION: 0
OS_SUPERIOR_NASAL_RESTRICTION: 0
OS_NORMAL: 1
OD_NORMAL: 1
OD_INFERIOR_TEMPORAL_RESTRICTION: 0
OS_INFERIOR_TEMPORAL_RESTRICTION: 0
OD_INFERIOR_NASAL_RESTRICTION: 0
OD_SUPERIOR_TEMPORAL_RESTRICTION: 0

## 2025-04-08 ASSESSMENT — KERATOMETRY
OD_K1POWER_DIOPTERS: 46.12
OS_AXISANGLE2_DEGREES: 157
OD_K2POWER_DIOPTERS: 48.62
OS_K2POWER_DIOPTERS: 49
OD_AXISANGLE_DEGREES: 76
OS_AXISANGLE_DEGREES: 67
METHOD_AUTO_MANUAL: AUTOMATED
OS_K1POWER_DIOPTERS: 47.50
OD_AXISANGLE2_DEGREES: 166

## 2025-04-08 ASSESSMENT — REFRACTION_CURRENTRX
OD_AXIS: 010
OD_CYLINDER: -0.75
OD_DIAMETER: 14.3
OD_SPHERE: +6.00
OD_BRAND: MY DAY TORIC
OD_BASECURVE: 8.6

## 2025-04-08 ASSESSMENT — CUP TO DISC RATIO
OD_RATIO: 0.45
OS_RATIO: 0.4

## 2025-04-08 ASSESSMENT — REFRACTION_MANIFEST
OD_AXIS: 103
METHOD_AUTOREFRACTION: 1
OD_SPHERE: +5.00
OS_CYLINDER: SPHERE
OD_AXIS: 090
OD_CYLINDER: +1.50
OS_SPHERE: +1.25
OS_SPHERE: +1.00
OD_SPHERE: +4.25
OS_CYLINDER: SPHERE
OD_CYLINDER: +1.00

## 2025-04-08 ASSESSMENT — VISUAL ACUITY
OS_CC: 20/30
OS_CC: 20/20
OD_CC: 20/30
OD_CC: 20/30
CORRECTION_TYPE: GLASSES, CONTACTS
METHOD: SNELLEN - LINEAR

## 2025-04-08 ASSESSMENT — TONOMETRY
IOP_METHOD: APPLANATION
OD_IOP_MMHG: 16
OS_IOP_MMHG: 16

## 2025-04-08 ASSESSMENT — REFRACTION_WEARINGRX
OD_CYLINDER: SPHERE
OS_SPHERE: +0.75
OD_ADD: +2.00
OS_AXIS: 090
OD_SPHERE: +0.50
OS_ADD: +2.50
OS_CYLINDER: +0.50
SPECS_TYPE: PAL

## 2025-04-08 ASSESSMENT — EXTERNAL EXAM - RIGHT EYE: OD_EXAM: NORMAL

## 2025-04-08 ASSESSMENT — EXTERNAL EXAM - LEFT EYE: OS_EXAM: NORMAL

## 2025-04-08 NOTE — PROGRESS NOTES
Chief Complaint   Patient presents with    Annual Eye Exam        Last Eye Exam: 04/2024  Dilated Previously: Yes, side effects of dilation explained today    What are you currently using to see?  glasses and contacts - PAL over contacts - does not need a contact lens eval today        Distance Vision Acuity: Noticed gradual change in both eyes    Near Vision Acuity: Satisfied with vision while reading and using computer with glasses    Eye Comfort: good  Do you use eye drops? : No      Lyn Lr - Optometric Assistant       Pohx;   Anisometropic amblyopia R corrects to 20/20-  S/p lasik L with hyperopic regression (Dr Piper)      Medical, surgical and family histories reviewed and updated 4/8/2025.       OBJECTIVE: See Ophthalmology exam    ASSESSMENT:    ICD-10-CM    1. Hyperopia of both eyes  H52.03       2. Anisometropia  H52.31       3. Presbyopia  H52.4       4. S/P LASIK surgery - Left Eye  Z98.890         Orx for progressive addition lens R w/ contact lens    PLAN:     Malorie Hinkle OD

## 2025-04-08 NOTE — LETTER
4/8/2025      Evita Carson  [address]  [facility] [address]      Dear Colleague,    [facility] you [facility] Carson[facility]. Please see a copy of my visit note below.    Chief Complaint   Patient presents with     Annual Eye Exam        Last Eye Exam: 04/2024  Dilated Previously: Yes, side effects of dilation explained today    What are you currently using to see?  glasses and contacts - PAL over contacts - does not need a contact lens eval today        Distance Vision Acuity: Noticed gradual change in both eyes    Near Vision Acuity: Satisfied with vision while reading and using computer with glasses    Eye Comfort: good  Do you use eye drops? : No      Lyn Lr - Optometric Assistant       Pohx;   Anisometropic amblyopia R corrects to 20/20-  S/p lasik L with hyperopic regression (Dr Piper)      Medical, surgical and family histories reviewed and updated 4/8/2025.       OBJECTIVE: See Ophthalmology exam    ASSESSMENT:    ICD-10-CM    1. Hyperopia of both eyes  H52.03       2. Anisometropia  H52.31       3. Presbyopia  H52.4       4. S/P LASIK surgery - Left Eye  Z98.890         Orx for progressive addition lens R w/ contact lens    PLAN:     Malorie Hinkle OD     Again, thank you for allowing me to participate in the care of your patient.        Sincerely,        Malorie Hinkle, OD    Electronically signed

## 2025-04-10 ENCOUNTER — OFFICE VISIT (OUTPATIENT)
Dept: OBGYN | Facility: CLINIC | Age: 55
End: 2025-04-10
Payer: COMMERCIAL

## 2025-04-10 VITALS — DIASTOLIC BLOOD PRESSURE: 80 MMHG | BODY MASS INDEX: 22.31 KG/M2 | SYSTOLIC BLOOD PRESSURE: 112 MMHG | WEIGHT: 131 LBS

## 2025-04-10 DIAGNOSIS — Z78.0 MENOPAUSE: ICD-10-CM

## 2025-04-10 RX ORDER — ESTRADIOL 0.5 MG/1
0.5 TABLET ORAL DAILY
Qty: 90 TABLET | Refills: 3 | Status: SHIPPED | OUTPATIENT
Start: 2025-04-10

## 2025-04-10 RX ORDER — ESTRADIOL ACETATE 0.05 MG/D
1 RING VAGINAL
Qty: 1 EACH | Refills: 3 | Status: SHIPPED | OUTPATIENT
Start: 2025-04-10

## 2025-04-10 RX ORDER — PROGESTERONE 200 MG/1
200 CAPSULE ORAL DAILY
Qty: 90 CAPSULE | Refills: 3 | Status: SHIPPED | OUTPATIENT
Start: 2025-04-10

## 2025-04-10 NOTE — NURSING NOTE
"Chief Complaint   Patient presents with    Recheck Medication       Initial /80   Wt 59.4 kg (131 lb)   LMP 2020   BMI 22.31 kg/m   Estimated body mass index is 22.31 kg/m  as calculated from the following:    Height as of 4/3/25: 1.632 m (5' 4.25\").    Weight as of this encounter: 59.4 kg (131 lb).  BP completed using cuff size: regular    Questioned patient about current smoking habits.  Pt. has never smoked.        "

## 2025-04-10 NOTE — PROGRESS NOTES
SUBJECTIVE:                                                     Evita Carson is a 55 year old female  who presents today for follow-up for symptomatic menopause.  She has been doing very well    From a symptom standpoint with estradiol 0.05 mg p.o. daily and Prometrium 200 mg daily.    Would like to consider transdermal estrogen but is active particularly with swimming, and patches would not adhere well. She would be interested in another form. We discussed both Femring and divigel. She would like to try Femring, as she used Nuvaring in the past and liked that.      Problem list and histories reviewed & adjusted, as indicated.  Additional history: as documented.    Patient Active Problem List   Diagnosis    Family history of ischemic heart disease - father's side of family - with normal lipids    Family history of ovarian cancer - following with Dr. Hennessy now - [facility]  - ? BSO in late 40's -mother negative on genetic testing    Essential hypertension with goal blood pressure less than 140/90    Irritable bowel syndrome with diarrhea    Anisometropia    Vitamin D deficiency    Menopause    Symptomatic menopausal or female climacteric states    Recurrent postcoital urinary tract infection    Encounter for gynecological examination without abnormal finding- sees Dr. Sujey Hennessy [facility] ob/[facility]    Pure hypercholesterolemia    Calcium deficiency- on calcium supplementation - normal levels on labs - hx of kidney stones    Coronary artery calcification seen on CT scan    Family history of dementia- dad with mild cognitive impairment - dx'd at age 83 - sx's started age 77     Past Surgical History:   Procedure Laterality Date    ABDOMINOPLASTY N/A 2019    Procedure: MINI ABDOMINOPLASTY;  Surgeon: Carolina Kitchen MD;  Location: [facility];  Service: Plastics     SECTION      COLONOSCOPY      Dr. Castro [facility]    COLONOSCOPY N/A 10/12/2018    Procedure: COLONOSCOPY;  COLONOSCOPY  [fv]  ;  Surgeon: Alexander Castro MD;  Location:  GI    ESOPHAGOSCOPY, GASTROSCOPY, DUODENOSCOPY (EGD), COMBINED N/A 2019    Procedure: ESOPHAGOGASTRODUODENOSCOPY, WITH BIOPSY;  Surgeon: Alexander Castro MD;  Location:  GI    LASIK Left     mini tummy tuck  2019    ZZC  DELIVERY ONLY  94,96,99,02    4 C/S      Social History     Tobacco Use    Smoking status: Never    Smokeless tobacco: Never   Substance Use Topics    Alcohol use: Yes     Comment: 0-1 a week      Problem (# of Occurrences) Relation (Name,Age of Onset)    Dementia (1) Father (Pelon, 83): possible early dementia- sx's since age 77    Alzheimer Disease (1) Maternal Grandfather (Artur): in late 70's    Cancer (1) Maternal Grandmother:  of ovarian cancer at age 50    Depression (1) Paternal Grandfather (Benjamin Pinedo): in old age    Gastrointestinal Disease (2) Father (Pelon): gallstones and gallstone pancreatitis at age 61, Paternal Grandmother:  of gallstone pancreatitis at age 50    Genitourinary Problems (1) Father (Pelon): uric acid kidney stone    Hypertension (3) Mother (Nola), Father (Pelon), Maternal Grandfather (Artur)    Lipids (1) Father (Pelon): low HDL    Osteoporosis (1) Mother (Nola): Osteopenia    Respiratory (1) Paternal Grandfather (Benjamin Pinedo): sleep apnea    C.A.D. (2) Father (Pelon): MI at age 59 and stented - , Paternal Grandfather (Benjamin Khris): bypass in 70's    Aneurysm (2) Paternal Uncle, Paternal Half-Sister (uncle)    Gallbladder Disease (1) Mother (Nola): Gallbladder removed- with some dysplasia noted    Colon Cancer (1) Maternal Uncle: 3 maternal great-uncles with colon cancer     Bladder Cancer (1) Mother (Nola)    Coronary Artery Disease Early Onset (1) Other: father's side of family - late 50's with normal LDLs and BP's               Current Outpatient Medications   Medication Sig Dispense Refill    aspirin (ASPIRIN LOW DOSE) 81 MG EC tablet Take 1 tablet (81 mg) by mouth daily 30  tablet 3    calcium citrate 250 MG TABS Take 2-3 tablets by mouth daily (Patient taking differently: Take 1-2 tablets by mouth daily.)      candesartan (ATACAND) 8 MG tablet Take 1 tablet (8 mg) by mouth daily. 90 tablet 3    Coenzyme Q10 (COQ10) 50 MG CAPS Take 70 mg by mouth daily      estradiol (ESTRACE) 0.5 MG tablet Take 1 tablet (0.5 mg) by mouth daily. 90 tablet 3    Estradiol Acetate (FEMRING) 0.05 MG/24HR RING Place 1 each vaginally every 3 months. 1 each 3    GLUCOSAMINE 500 MG OR CAPS Take by mouth daily  0    MULTI-VITAMIN OR TABS 1 TABLET DAILY 30 0    OMEGA-3 CAPS   OR Take by mouth daily EPA  0    PROBIOTICA OR CHEW take one tablet daily  0    progesterone (PROMETRIUM) 200 MG capsule Take 1 capsule (200 mg) by mouth daily. 90 capsule 3    rosuvastatin (CRESTOR) 10 MG tablet Take 1 tablet (10 mg) by mouth daily at 2 pm. 90 tablet 3    sulfamethoxazole-trimethoprim (BACTRIM DS) 800-160 MG tablet TAKE ONE TABLET BY MOUTH AFTER INTERCOURSE AS NEEDED 30 tablet 1    Vitamin D-Vitamin K (VITAMIN K2-VITAMIN D3 PO) Take by mouth daily       No current facility-administered medications for this visit.     Allergies   Allergen Reactions    No Known Drug Allergy        ROS:  Negative other than as noted in HPI.    OBJECTIVE:     Exam:  Constitutional:  Appearance: Well nourished, well developed alert, in no acute distress  Neurologic/Psychiatric:  Mental Status:  Oriented X3       In-Clinic Test Results:  No results found for this or any previous visit (from the past 24 hours).    ASSESSMENT/PLAN:                                                        ICD-10-CM    1. Menopause  Z78.0 estradiol (ESTRACE) 0.5 MG tablet     progesterone (PROMETRIUM) 200 MG capsule     Estradiol Acetate (FEMRING) 0.05 MG/24HR RING          Will take a written prescription for Femring and in the mean time, estradiol oral was renewed. Prescription for progesterone sent as well. Follow up yearly or as needed.    MD CARMELO Montemayor  [facility]

## 2025-04-15 ENCOUNTER — APPOINTMENT (OUTPATIENT)
Age: 55
Setting detail: DERMATOLOGY
End: 2025-04-15

## 2025-04-15 DIAGNOSIS — L65.8 OTHER SPECIFIED NONSCARRING HAIR LOSS: ICD-10-CM

## 2025-04-15 DIAGNOSIS — Z41.9 ENCOUNTER FOR PROCEDURE FOR PURPOSES OTHER THAN REMEDYING HEALTH STATE, UNSPECIFIED: ICD-10-CM

## 2025-04-15 PROCEDURE — ? PRESCRIPTION MEDICATION MANAGEMENT

## 2025-04-15 PROCEDURE — ? INVENTORY

## 2025-04-15 PROCEDURE — ? COUNSELING

## 2025-04-15 PROCEDURE — ? BOTOX

## 2025-04-15 PROCEDURE — ? PRESCRIPTION

## 2025-04-15 RX ORDER — MINOXIDIL 2.5 MG/1
TABLET ORAL
Qty: 90 | Refills: 2 | Status: ERX | COMMUNITY
Start: 2025-04-15

## 2025-04-15 RX ADMIN — MINOXIDIL: 2.5 TABLET ORAL at 00:00

## 2025-04-15 NOTE — PROCEDURE: PRESCRIPTION MEDICATION MANAGEMENT
Render In Strict Bullet Format?: No
Detail Level: Zone
Initiate Treatment: Minoxidil 2.5mg tablet, take 1/2 tablet po qd

## 2025-04-15 NOTE — HPI: COSMETIC (BOTOX)
Have You Had Botox Before?: has had botox
When Was Your Last Botox Treatment?: 8/13/2024
Additional History: She states that she has a small stipe down the middle of the forehead that does not seem to be affected as much as the rest of the face.

## 2025-08-12 ENCOUNTER — HOSPITAL ENCOUNTER (OUTPATIENT)
Dept: MAMMOGRAPHY | Facility: CLINIC | Age: 55
Discharge: HOME | End: 2025-08-12
Attending: FAMILY MEDICINE
Payer: COMMERCIAL

## 2025-08-12 DIAGNOSIS — Z12.31 VISIT FOR SCREENING MAMMOGRAM: ICD-10-CM

## 2025-08-12 PROCEDURE — 77063 BREAST TOMOSYNTHESIS BI: CPT

## 2025-08-19 ENCOUNTER — APPOINTMENT (OUTPATIENT)
Age: 55
Setting detail: DERMATOLOGY
End: 2025-08-19

## 2025-08-19 DIAGNOSIS — Z41.9 ENCOUNTER FOR PROCEDURE FOR PURPOSES OTHER THAN REMEDYING HEALTH STATE, UNSPECIFIED: ICD-10-CM

## 2025-08-19 PROCEDURE — ? BOTOX

## 2025-08-19 PROCEDURE — ? INVENTORY

## (undated) DEVICE — KIT ENDO TURNOVER/PROCEDURE W/CLEAN A SCOPE LINERS 103888

## (undated) DEVICE — ENDO BITE BLOCK ADULT OLYMPUS LATEX FREE MAJ-1632

## (undated) RX ORDER — ONDANSETRON 2 MG/ML
INJECTION INTRAMUSCULAR; INTRAVENOUS
Status: DISPENSED
Start: 2018-10-12

## (undated) RX ORDER — FENTANYL CITRATE 50 UG/ML
INJECTION, SOLUTION INTRAMUSCULAR; INTRAVENOUS
Status: DISPENSED
Start: 2018-10-12

## (undated) RX ORDER — FENTANYL CITRATE 50 UG/ML
INJECTION, SOLUTION INTRAMUSCULAR; INTRAVENOUS
Status: DISPENSED
Start: 2019-05-02